# Patient Record
Sex: FEMALE | Race: ASIAN | NOT HISPANIC OR LATINO | Employment: OTHER | URBAN - METROPOLITAN AREA
[De-identification: names, ages, dates, MRNs, and addresses within clinical notes are randomized per-mention and may not be internally consistent; named-entity substitution may affect disease eponyms.]

---

## 2017-02-24 ENCOUNTER — TELEPHONE (OUTPATIENT)
Dept: FAMILY MEDICINE | Facility: CLINIC | Age: 62
End: 2017-02-24

## 2017-02-24 NOTE — TELEPHONE ENCOUNTER
Panel Management Review      Patient has the following on her problem list: None      Composite cancer screening  Chart review shows that this patient is due/due soon for the following Mammogram and Fecal Colorectal (FIT)  Summary:    Patient is due/failing the following:   FIT and MAMMOGRAM    Action needed: Mammogram,fit     Type of outreach:    letter mailed    Questions for provider review:    None                                                                                                                                    Grupo Kwan MA       Chart routed to Care Team .      Left message to call clinic.    Left message to call clinic.    Left message to call clinic.    Final letter mailed.

## 2017-02-24 NOTE — LETTER
February 24, 2017    Gayla Firnsthl  [address] [place] [phone]    Dear Gayla    We care about your health and have reviewed your health plan. We have reviewed your medical conditions, medication list, and lab results and are making recommendations based on this review, to better manage your health.    You are in particular need of attention regarding:  - Scheduling a Breast Cancer Screening (Mammography) [phone]  - Completing a Colon Cancer Screening (FIT) - Please complete FIT test *** and mail completed test to clinic.      Here is a list of Health Maintenance topics that are due now or due soon:  Health Maintenance Due   Topic Date Due     ADVANCE DIRECTIVE PLANNING Q5 YRS (NO INBASKET)  05/17/1971     HEPATITIS C SCREENING  05/17/1971     MAMMO SCREEN Q2 YR (SYSTEM ASSIGNED)  05/17/1993     INFLUENZA VACCINE (SYSTEM ASSIGNED)  09/01/2016     FIT Q1 YR (NO INBASKET)  11/13/2016     We will be calling you in the next couple of weeks to help you schedule any appointments that are needed.  Please call us at [phone] (or use [employer]) to address the above recommendations.     Thank you for trusting [facility] and we appreciate the opportunity to serve you.  We look forward to supporting your healthcare needs in the future.    Healthy Regards,    Dr. Harrison

## 2017-02-24 NOTE — LETTER
March 23, 2017    Gayla Torres  [address] [place] [phone]      Dear Gayla Torres,     We have tried to contact you about your health, but have been unable to reach you.  Please call us as soon as possible so we can provide you with the best care possible.  We will continue to check in with you throughout the year to complete these items of care, if you are not able to complete these items at this time.  If you would like to complete the missing items for your care, please contact us at [phone].    We recommend the following:  -schedule a MAMMOGRAM 1 in 8 women will develop invasive breast cancer during her lifetime and it is the most common non-skin cancer in American women.  EARLY detection, new treatments, and a better understanding of the disease have increased survival rates - the 5 year survival rate in the 1960s was 63% and today it is close to 90% .  Please disregard this reminder if you have had this exam elsewhere within the last year.  It would be helpful for us to have a copy of your mammogram report in our file so that we can best coordinate your care - please contact us with when your test was done so we can update your record. Please call [phone] to schedule your mammogram today.   -complete a FIT TEST a FIT test or Fecal Immunochemical Occult Blood Test is a take home stool sample kit.  It does not replace the colonoscopy for colorectal cancer screening, but it can detect hidden bleeding in the lower colon.  It does need to be repeated every year and if a positive result is obtained, you would be referred for a colonoscopy.  If you have completed either one of these tests at another facility, please have the records sent to our clinic so that we can best coordinate your care.    Sincerely,     Your Care Team at [facility]

## 2017-11-12 ENCOUNTER — HEALTH MAINTENANCE LETTER (OUTPATIENT)
Age: 62
End: 2017-11-12

## 2018-05-21 ENCOUNTER — OFFICE VISIT (OUTPATIENT)
Dept: FAMILY MEDICINE | Facility: CLINIC | Age: 63
End: 2018-05-21
Payer: MEDICARE

## 2018-05-21 VITALS
WEIGHT: 126 LBS | HEART RATE: 71 BPM | TEMPERATURE: 97.8 F | HEIGHT: 62 IN | SYSTOLIC BLOOD PRESSURE: 117 MMHG | DIASTOLIC BLOOD PRESSURE: 77 MMHG | BODY MASS INDEX: 23.19 KG/M2

## 2018-05-21 DIAGNOSIS — L30.9 DERMATITIS: ICD-10-CM

## 2018-05-21 DIAGNOSIS — Z01.118 ABNORMAL EXAM OF RIGHT EAR: ICD-10-CM

## 2018-05-21 DIAGNOSIS — Z12.31 VISIT FOR SCREENING MAMMOGRAM: ICD-10-CM

## 2018-05-21 DIAGNOSIS — Z00.00 ROUTINE GENERAL MEDICAL EXAMINATION AT A HEALTH CARE FACILITY: Primary | ICD-10-CM

## 2018-05-21 DIAGNOSIS — Z12.4 SCREENING FOR MALIGNANT NEOPLASM OF CERVIX: ICD-10-CM

## 2018-05-21 DIAGNOSIS — Z11.59 NEED FOR HEPATITIS C SCREENING TEST: ICD-10-CM

## 2018-05-21 DIAGNOSIS — Z78.0 ASYMPTOMATIC POSTMENOPAUSAL STATUS: ICD-10-CM

## 2018-05-21 DIAGNOSIS — Z23 NEED FOR VACCINATION WITH 13-POLYVALENT PNEUMOCOCCAL CONJUGATE VACCINE: ICD-10-CM

## 2018-05-21 DIAGNOSIS — L72.0 CYST OF SKIN AND SUBCUTANEOUS TISSUE: ICD-10-CM

## 2018-05-21 DIAGNOSIS — Z12.11 SCREEN FOR COLON CANCER: ICD-10-CM

## 2018-05-21 DIAGNOSIS — F17.200 TOBACCO USE DISORDER: ICD-10-CM

## 2018-05-21 PROCEDURE — G0476 HPV COMBO ASSAY CA SCREEN: HCPCS | Performed by: FAMILY MEDICINE

## 2018-05-21 PROCEDURE — G0009 ADMIN PNEUMOCOCCAL VACCINE: HCPCS | Performed by: FAMILY MEDICINE

## 2018-05-21 PROCEDURE — 99213 OFFICE O/P EST LOW 20 MIN: CPT | Mod: 25 | Performed by: FAMILY MEDICINE

## 2018-05-21 PROCEDURE — G0402 INITIAL PREVENTIVE EXAM: HCPCS | Performed by: FAMILY MEDICINE

## 2018-05-21 PROCEDURE — G0145 SCR C/V CYTO,THINLAYER,RESCR: HCPCS | Performed by: FAMILY MEDICINE

## 2018-05-21 PROCEDURE — 90670 PCV13 VACCINE IM: CPT | Performed by: FAMILY MEDICINE

## 2018-05-21 PROCEDURE — 87624 HPV HI-RISK TYP POOLED RSLT: CPT | Performed by: FAMILY MEDICINE

## 2018-05-21 RX ORDER — TRIAMCINOLONE ACETONIDE 1 MG/G
OINTMENT TOPICAL
Qty: 80 G | Refills: 0 | Status: SHIPPED | OUTPATIENT
Start: 2018-05-21 | End: 2019-06-03

## 2018-05-21 ASSESSMENT — ACTIVITIES OF DAILY LIVING (ADL)
CURRENT_FUNCTION: NO ASSISTANCE NEEDED
I_NEED_ASSISTANCE_FOR_THE_FOLLOWING_DAILY_ACTIVITIES:: NO ASSISTANCE IS NEEDED

## 2018-05-21 NOTE — PROGRESS NOTES
SUBJECTIVE:   Gayla Torres is a 63 year old female who presents for Preventive Visit.  Are you in the first 12 months of your Medicare coverage?  Yes,  Visual Acuity:  Right Eye: 20/32   Left Eye: 20/40  Both Eyes: 20/40    Physical   Annual:     Getting at least 3 servings of Calcium per day::  NO    Bi-annual eye exam::  Yes    Dental care twice a year::  NO    Sleep apnea or symptoms of sleep apnea::  None    Diet::  Regular (no restrictions)    Frequency of exercise::  None    Taking medications regularly::  Yes    Medication side effects::  None    Additional concerns today::  No    Ability to successfully perform activities of daily living: no assistance needed  Home Safety:  Throw rugs in the hallway  Hearing Impairment: difficulty following a conversation in a noisy restaurant or crowded room    Fall risk:  Fallen 2 or more times in the past year?: No  Any fall with injury in the past year?: No    COGNITIVE SCREEN  1) Repeat 3 items (Banana, Sunrise, Chair)    2) Clock draw: NORMAL  3) 3 item recall: Recalls 1 object   Results: NORMAL clock, 1-2 items recalled: COGNITIVE IMPAIRMENT LESS LIKELY    Mini-CogTM Copyright S Calvin. Licensed by the author for use in [facility]; reprinted with permission ([email or web address]). All rights reserved.        Reviewed and updated as needed this visit by clinical staff         Reviewed and updated as needed this visit by Provider        Social History   Substance Use Topics     Smoking status: Current Every Day Smoker     Packs/day: 1.00     Smokeless tobacco: Never Used     Alcohol use Yes      Comment: social        Alcohol Use 5/21/2018   If you drink alcohol do you typically have greater than 3 drinks per day OR greater than 7 drinks per week? No   No flowsheet data found.        Rash      Duration: 1 month     Description  Location: on arms, and lump on left side of face  Itching: severe    Intensity:  moderate    Accompanying signs and symptoms:  None    History (similar episodes/previous evaluation): None    Precipitating or alleviating factors:  New exposures:  None  Recent travel: no      Therapies tried and outcome: peroxide, salt water and lotion.      Today's PHQ-2 Score:   PHQ-2 ( 1999 [employer]) 5/21/2018   Q1: Little interest or pleasure in doing things 0   Q2: Feeling down, depressed or hopeless 0   PHQ-2 Score 0   Q1: Little interest or pleasure in doing things Not at all   Q2: Feeling down, depressed or hopeless Not at all   PHQ-2 Score 0       Do you feel safe in your environment - Yes    Do you have a Health Care Directive?: No: Advance care planning was reviewed with patient; patient declined at this time.    Current providers sharing in care for this patient include:   Patient Care Team:  Danielle Harrison MD as PCP - General (Family Practice)    The following health maintenance items are reviewed in Epic and correct as of today:  Health Maintenance   Topic Date Due     HIV SCREEN (SYSTEM ASSIGNED)  05/17/1971     HEPATITIS C SCREENING  05/17/1971     MAMMO SCREEN Q2 YR (SYSTEM ASSIGNED)  05/17/2003     ADVANCE DIRECTIVE PLANNING Q5 YRS  05/17/2008     FIT Q1 YR  11/13/2016     PAP Q3 YR  08/04/2017     FALL RISK ASSESSMENT  05/17/2018     DEXA SCAN SCREENING (SYSTEM ASSIGNED)  05/17/2018     PNEUMOCOCCAL (1 of 2 - PCV13) 05/17/2018     INFLUENZA VACCINE (Season Ended) 09/01/2018     LIPID SCREEN Q5 YR FEMALE (SYSTEM ASSIGNED)  11/09/2020     TETANUS IMMUNIZATION (SYSTEM ASSIGNED)  08/04/2024     Labs reviewed in EPIC  BP Readings from Last 3 Encounters:   05/21/18 117/77   04/25/16 109/67   11/09/15 109/68    Wt Readings from Last 3 Encounters:   05/21/18 126 lb (57.2 kg)   04/25/16 120 lb (54.4 kg)   11/09/15 119 lb (54 kg)                  Patient Active Problem List   Diagnosis     CARDIOVASCULAR SCREENING; LDL GOAL LESS THAN 160     H/O bilateral breast implants     Tobacco use disorder     History reviewed. No pertinent surgical  "history.    Social History   Substance Use Topics     Smoking status: Current Every Day Smoker     Packs/day: 1.00     Smokeless tobacco: Never Used     Alcohol use Yes      Comment: social      History reviewed. No pertinent family history.      No current outpatient prescriptions on file.     No Known Allergies  Recent Labs   Lab Test  11/09/15   1135  10/08/14   0807  08/25/14   0758   A1C  5.9  5.8   --    LDL  161*   --   116   HDL  34*   --   32*   TRIG  106   --   231*        Pneumonia Vaccine:Adults age 65+ who have not received previous Pneumovax (PPSV23) or PCV13 as an adult: Should first be given PCV13 AND then should be given PPSV23 6-12 months after PCV13  Mammogram Screening: Patient over age 50, mutual decision to screen reflected in health maintenance.  History of abnormal Pap smear: NO - age 65 - see link Cervical Cytology Screening Guidelines    Review of Systems  Constitutional, HEENT, cardiovascular, pulmonary, gi and gu systems are negative, except as otherwise noted.    OBJECTIVE:   /77 (BP Location: Left arm, Patient Position: Sitting, Cuff Size: Adult Regular)  Pulse 71  Temp 97.8  F (36.6  C) (Oral)  Ht 5' 2\" (1.575 m)  Wt 126 lb (57.2 kg)  BMI 23.05 kg/m2 Estimated body mass index is 23.05 kg/(m^2) as calculated from the following:    Height as of this encounter: 5' 2\" (1.575 m).    Weight as of this encounter: 126 lb (57.2 kg).  Physical Exam  GENERAL: healthy, alert and no distress  EYES: Eyes grossly normal to inspection, PERRL and conjunctivae and sclerae normal  HENT: right EAC: yellowish discharge+, abnormal TM structure left:  ear canals and TM's normal, nose and mouth without ulcers or lesions  NECK: no adenopathy, no asymmetry, masses, or scars and thyroid normal to palpation  RESP: lungs clear to auscultation - no rales, rhonchi or wheezes  BREAST: s/p bilateral breast implants, normal without masses, tenderness or nipple discharge and no palpable axillary masses or " adenopathy  CV: regular rate and rhythm, normal S1 S2, no S3 or S4, no murmur, click or rub, no peripheral edema and peripheral pulses strong  ABDOMEN: soft, nontender, no hepatosplenomegaly, no masses and bowel sounds normal   (female): normal female external genitalia, normal urethral meatus, vaginal mucosa pink, moist, well rugated, and normal cervix/adnexa/uterus without masses or discharge  MS: no gross musculoskeletal defects noted, no edema  SKIN: dry erythematous nontender patches on the forearm skin.   Right side of face: few mm, nontender, non infected cyst.   NEURO: Normal strength and tone, mentation intact and speech normal  PSYCH: mentation appears normal, affect normal/bright    ASSESSMENT / PLAN:   Gayla was seen today for physical and health maintenance.    Diagnoses and all orders for this visit:    Routine general medical examination at a health care facility  -     Lipid panel reflex to direct LDL Fasting; Future  -     Glucose; Future    Screen for colon cancer  -     Fecal colorectal cancer screen FIT - Future (S+30); Future    Asymptomatic postmenopausal status  -     DEXA HIP/PELVIS/SPINE - Future; Future    Visit for screening mammogram  -     MA SCREENING DIGITAL BILAT - Future  (s+30); Future    Screening for malignant neoplasm of cervix, if PAP, HOV neg, then off PAP screen.   -     Pap imaged thin layer screen with HPV - recommended age 30 - 65 years (select HPV order below)  -     HPV High Risk Types DNA Cervical    Need for hepatitis C screening test  -     Hepatitis C Screen Reflex to HCV RNA Quant and Genotype; Future    Dermatitis  -     triamcinolone (KENALOG) 0.1 % ointment; Apply sparingly to affected area: bilateral hands, arms, forearms three times daily for 14 days.    Cyst of skin and subcutaneous tissue  Comments:  right side of face    Orders:  -     GENERAL SURG ADULT REFERRAL    Tobacco use disorder: encouraged quit smoking, pt not ready. Lung cancer screening with low  "dose CT offered, pt not interested today.     Abnormal exam of right ear  -     OTOLARYNGOLOGY REFERRAL    Need for vaccination with 13-polyvalent pneumococcal conjugate vaccine  -     VACCINE ADMINISTRATION, INITIAL  -     PNEUMOCOCCAL CONJ VACCINE 13 VALENT IM    Other orders  -     Cancel: Pap imaged thin layer screen with HPV - recommended age 30 - 65 years (select HPV order below)        End of Life Planning:  Patient currently has an advanced directive: No.  I have verified the patient's ablity to prepare an advanced directive/make health care decisions.  Literature was provided to assist patient in preparing an advanced directive.    COUNSELING:  Reviewed preventive health counseling, as reflected in patient instructions        Estimated body mass index is 21.95 kg/(m^2) as calculated from the following:    Height as of 4/25/16: 5' 2\" (1.575 m).    Weight as of 4/25/16: 120 lb (54.4 kg).     reports that she has been smoking.  She has been smoking about 1.00 pack per day. She has never used smokeless tobacco.  Tobacco Cessation Action Plan: Information offered: Patient not interested at this time    Appropriate preventive services were discussed with this patient, including applicable screening as appropriate for cardiovascular disease, diabetes, osteopenia/osteoporosis, and glaucoma.  As appropriate for age/gender, discussed screening for colorectal cancer, prostate cancer, breast cancer, and cervical cancer. Checklist reviewing preventive services available has been given to the patient.    Reviewed patients plan of care and provided an AVS. The Basic Care Plan (routine screening as documented in Health Maintenance) for Gayla meets the Care Plan requirement. This Care Plan has been established and reviewed with the Patient.    Counseling Resources:  ATP IV Guidelines  Pooled Cohorts Equation Calculator  Breast Cancer Risk Calculator  FRAX Risk Assessment  ICSI Preventive Guidelines  Dietary Guidelines for " Americans, 2010  USDA's MyPlate  ASA Prophylaxis  Lung CA Screening    Danielle Harrison MD  [facility]  Answers for HPI/ROS submitted by the patient on 5/21/2018   PHQ-2 Score: 0

## 2018-05-21 NOTE — MR AVS SNAPSHOT
After Visit Summary   5/21/2018    Gayla Torres    MRN: [id number]           Patient Information     Date Of Birth          1955        Visit Information        Provider Department      5/21/2018 4:40 PM Danielle Harrison MD [facility]        Today's Diagnoses     Routine general medical examination at a health care facility    -  1    Screen for colon cancer        Asymptomatic postmenopausal status        Visit for screening mammogram        Screening for malignant neoplasm of cervix        Need for hepatitis C screening test        H/O bilateral breast implants        Dermatitis        Cyst of skin and subcutaneous tissue        Tobacco use disorder        Abnormal exam of right ear           Follow-ups after your visit        Additional Services     GENERAL SURG ADULT REFERRAL       Your provider has referred you to: [facility] [phone]   [email or web address]    Please be aware that coverage of these services is subject to the terms and limitations of your health insurance plan.  Call member services at your health plan with any benefit or coverage questions.      Please bring the following with you to your appointment:    (1) Any X-Rays, CTs or MRIs which have been performed.  Contact the facility where they were done to arrange for  prior to your scheduled appointment.   (2) List of current medications   (3) This referral request   (4) Any documents/labs given to you for this referral            OTOLARYNGOLOGY REFERRAL       Your provider has referred you to: [facility]: [facility] [phone]   [email or web address]    Please be aware that coverage of these services is subject to the terms and limitations of your health insurance plan.  Call member services at your health plan with any benefit or coverage questions.      Please bring the following with you to  "your appointment:    (1) Any X-Rays, CTs or MRIs which have been performed.  Contact the facility where they were done to arrange for  prior to your scheduled appointment.   (2) List of current medications  (3) This referral request   (4) Any documents/labs given to you for this referral                  Future tests that were ordered for you today     Open Future Orders        Priority Expected Expires Ordered    DEXA HIP/PELVIS/SPINE - Future Routine  5/21/2019 5/21/2018    MA SCREENING DIGITAL BILAT - Future  (s+30) Routine  5/21/2019 5/21/2018    Hepatitis C Screen Reflex to HCV RNA Quant and Genotype Routine  5/21/2019 5/21/2018    Fecal colorectal cancer screen FIT - Future (S+30) Routine 6/11/2018 6/20/2018 5/21/2018    Lipid panel reflex to direct LDL Fasting Routine  5/21/2019 5/21/2018    Glucose Routine  5/21/2019 5/21/2018            Who to contact     If you have questions or need follow up information about today's clinic visit or your schedule please contact [facility] directly at [phone].  Normal or non-critical lab and imaging results will be communicated to you by [employer], letter or phone within 4 business days after the clinic has received the results. If you do not hear from us within 7 days, please contact the clinic through [employer] or phone. If you have a critical or abnormal lab result, we will notify you by phone as soon as possible.  Submit refill requests through [employer] or call your pharmacy and they will forward the refill request to us. Please allow 3 business days for your refill to be completed.          Additional Information About Your Visit        [employer] Information     [employer] lets you send messages to your doctor, view your test results, renew your prescriptions, schedule appointments and more. To sign up, go to [email or web address] . Click on \"Log in\" on the left side of the screen, which will take you to the Welcome page. Then click on " "\"Sign up Now\" on the right side of the page.     You will be asked to enter the access code listed below, as well as some personal information. Please follow the directions to create your username and password.     Your access code is: [email or web address]  Expires: 2018  5:39 PM     Your access code will  in 90 days. If you need help or a new code, please call your [facility] or [phone].        Care EveryWhere ID     This is your Care EveryWhere ID. This could be used by other organizations to access your [facility] medical records  [id number]        Your Vitals Were     Pulse Temperature Height BMI (Body Mass Index)          71 97.8  F (36.6  C) (Oral) 5' 2\" (1.575 m) 23.05 kg/m2         Blood Pressure from Last 3 Encounters:   18 117/77   16 109/67   11/09/15 109/68    Weight from Last 3 Encounters:   18 126 lb (57.2 kg)   16 120 lb (54.4 kg)   11/09/15 119 lb (54 kg)              We Performed the Following     GENERAL SURG ADULT REFERRAL     OTOLARYNGOLOGY REFERRAL          Today's Medication Changes          These changes are accurate as of 18  5:39 PM.  If you have any questions, ask your nurse or doctor.               Start taking these medicines.        Dose/Directions    triamcinolone 0.1 % ointment   Commonly known as:  KENALOG   Used for:  Dermatitis   Started by:  Danielle Harrison MD        Apply sparingly to affected area: bilateral hands, arms, forearms three times daily for 14 days.   Quantity:  80 g   Refills:  0            Where to get your medicines      Some of these will need a paper prescription and others can be bought over the counter.  Ask your nurse if you have questions.     Bring a paper prescription for each of these medications     triamcinolone 0.1 % ointment                Primary Care Provider Office Phone # Fax #    Danielle Harrison [phone]       [address] [facility] [address]        Equal " Access to Services     [facility]: Hadii aad ku hadyohanaleti Brendonali, waveronicada luqadaha, qaybta kanicolekay de. So [facility] [phone].    ATENCIÓN: Si habla español, tiene a gomez disposición servicios gratuitos de asistencia lingüística. Llame al [phone].    We comply with applicable federal civil rights laws and [place] laws. We do not discriminate on the basis of race, color, national origin, age, disability, sex, sexual orientation, or gender identity.            Thank you!     Thank you for choosing [facility]  for your care. Our goal is always to provide you with excellent care. Hearing back from our patients is one way we can continue to improve our services. Please take a few minutes to complete the written survey that you may receive in the mail after your visit with us. Thank you!             Your Updated Medication List - Protect others around you: Learn how to safely use, store and throw away your medicines at www.disposemymeds.org.          This list is accurate as of 5/21/18  5:39 PM.  Always use your most recent med list.                   Brand Name Dispense Instructions for use Diagnosis    triamcinolone 0.1 % ointment    KENALOG    80 g    Apply sparingly to affected area: bilateral hands, arms, forearms three times daily for 14 days.    Dermatitis

## 2018-05-21 NOTE — LETTER
May 31, 2018      Gayla Torres  [address] [facility] [address]    Dear ,      I am happy to inform you that your cervical cancer screening test (PAP smear) was normal and your Human Papillomavirus (HPV) test was negative.    Per current guidelines, you no longer need to have pap smears completed.     Please continue to be seen every year for an annual wellness visit and other preventative tests.     Please contact my office at [phone] if you have further questions.    Sincerely,      Danielle Harrison MD/[facility]

## 2018-05-25 LAB
COPATH REPORT: NORMAL
PAP: NORMAL

## 2018-05-30 PROBLEM — Z12.4 CERVICAL CANCER SCREENING: Status: ACTIVE | Noted: 2018-05-30

## 2018-05-30 LAB
FINAL DIAGNOSIS: NORMAL
HPV HR 12 DNA CVX QL NAA+PROBE: NEGATIVE
HPV16 DNA SPEC QL NAA+PROBE: NEGATIVE
HPV18 DNA SPEC QL NAA+PROBE: NEGATIVE
SPECIMEN DESCRIPTION: NORMAL
SPECIMEN SOURCE CVX/VAG CYTO: NORMAL

## 2018-06-04 DIAGNOSIS — Z11.59 NEED FOR HEPATITIS C SCREENING TEST: ICD-10-CM

## 2018-06-04 DIAGNOSIS — Z12.11 SCREEN FOR COLON CANCER: ICD-10-CM

## 2018-06-04 DIAGNOSIS — Z00.00 ROUTINE GENERAL MEDICAL EXAMINATION AT A HEALTH CARE FACILITY: ICD-10-CM

## 2018-06-04 LAB
CHOLEST SERPL-MCNC: 143 MG/DL
GLUCOSE SERPL-MCNC: 99 MG/DL (ref 70–99)
HCV AB SERPL QL IA: NONREACTIVE
HDLC SERPL-MCNC: 19 MG/DL
LDLC SERPL CALC-MCNC: ABNORMAL MG/DL
LDLC SERPL DIRECT ASSAY-MCNC: 83 MG/DL
NONHDLC SERPL-MCNC: 124 MG/DL
TRIGL SERPL-MCNC: 660 MG/DL

## 2018-06-04 PROCEDURE — 36415 COLL VENOUS BLD VENIPUNCTURE: CPT | Performed by: FAMILY MEDICINE

## 2018-06-04 PROCEDURE — 82947 ASSAY GLUCOSE BLOOD QUANT: CPT | Performed by: FAMILY MEDICINE

## 2018-06-04 PROCEDURE — 80061 LIPID PANEL: CPT | Performed by: FAMILY MEDICINE

## 2018-06-04 PROCEDURE — 82274 ASSAY TEST FOR BLOOD FECAL: CPT | Performed by: FAMILY MEDICINE

## 2018-06-04 PROCEDURE — G0472 HEP C SCREEN HIGH RISK/OTHER: HCPCS | Performed by: FAMILY MEDICINE

## 2018-06-04 PROCEDURE — 83721 ASSAY OF BLOOD LIPOPROTEIN: CPT | Mod: 59 | Performed by: FAMILY MEDICINE

## 2018-06-04 NOTE — LETTER
"[facility]  [address] [place]  [address]  [phone]        June 5, 2018    Gayla Melissa  [address] [address]        Dear Gayla,    Your laboratory tests show a normal glucose reading, negative/normal hepatitis C antibody test, and negative/normal fecal occult blood test.  Your triglycerides are elevated and your HDL \"good\" cholesterol is on the low side.  You have had similar readings to this in the past.  Healthy eating and regular exercise would be appropriate treatment for this.   We would advise a follow-up visit in 1 year with one of Dr. Harrison's colleagues.    Results for orders placed or performed in visit on 06/04/18   Hepatitis C Screen Reflex to HCV RNA Quant and Genotype   Result Value Ref Range    Hepatitis C Antibody Nonreactive NR^Nonreactive   Lipid panel reflex to direct LDL Fasting   Result Value Ref Range    Cholesterol 143 <200 mg/dL    Triglycerides 660 (H) <150 mg/dL    HDL Cholesterol 19 (L) >49 mg/dL    LDL Cholesterol Calculated  <100 mg/dL     Cannot estimate LDL when triglyceride exceeds 400 mg/dL    Non HDL Cholesterol 124 <130 mg/dL   Glucose   Result Value Ref Range    Glucose 99 70 - 99 mg/dL   Fecal colorectal cancer screen FIT - Future (S+30)   Result Value Ref Range    Occult Blood Scn FIT Negative NEG^Negative   LDL cholesterol direct   Result Value Ref Range    LDL Cholesterol Direct 83 <100 mg/dL       If you have any questions please call the clinic at [phone].    Sincerely,    [facility]  LMD    "

## 2018-06-05 LAB — HEMOCCULT STL QL IA: NEGATIVE

## 2018-06-05 NOTE — PROGRESS NOTES
"Gayla,  Your laboratory tests show a normal glucose reading, negative/normal hepatitis C antibody test, and negative/normal fecal occult blood test.  Your triglycerides are elevated and your HDL \"good\" cholesterol is on the low side.  You have had similar readings to this in the past.  Healthy eating and regular exercise would be appropriate treatment for this.   We would advise a follow-up visit in 1 year with one of Dr. Harrison's colleagues.    [facility]"

## 2018-06-14 ENCOUNTER — OFFICE VISIT (OUTPATIENT)
Dept: SURGERY | Facility: CLINIC | Age: 63
End: 2018-06-14
Payer: MEDICARE

## 2018-06-14 VITALS
BODY MASS INDEX: 23.19 KG/M2 | HEIGHT: 62 IN | SYSTOLIC BLOOD PRESSURE: 136 MMHG | HEART RATE: 74 BPM | WEIGHT: 126 LBS | DIASTOLIC BLOOD PRESSURE: 84 MMHG

## 2018-06-14 DIAGNOSIS — L72.3 SEBACEOUS CYST: Primary | ICD-10-CM

## 2018-06-14 PROCEDURE — 11440 EXC FACE-MM B9+MARG 0.5 CM/<: CPT | Performed by: SURGERY

## 2018-06-14 PROCEDURE — 99203 OFFICE O/P NEW LOW 30 MIN: CPT | Mod: 25 | Performed by: SURGERY

## 2018-06-14 NOTE — PROGRESS NOTES
"  HISTORY     Chief Complaint   Patient presents with     Consult     cyst on face     HISTORY OF PRESENT ILLNESS: Gayla Torres is a 63 year old female with a past medical history as noted below, presents for evaluation of right face sebaceous cyst.  Patient states that it has been present for 6 months and has progressively become bigger.  She has had episodes of spontaneous drainage, but it always comes back.  she denies and fever chills.  . Doing well. Tolerating diet and having bowel movements. No nausea or vomiting    PAST MEDICAL/SURGICAL HISTORY  No past medical history on file.  No past surgical history on file.    MEDICATIONS AND ALLERGIES  No Known Allergies    Current Outpatient Prescriptions:      triamcinolone (KENALOG) 0.1 % ointment, Apply sparingly to affected area: bilateral hands, arms, forearms three times daily for 14 days., Disp: 80 g, Rfl: 0    SOCIAL HISTORY  Social History     Social History     Marital status:      Spouse name: N/A     Number of children: N/A     Years of education: N/A     Occupational History     Not on file.     Social History Main Topics     Smoking status: Current Every Day Smoker     Packs/day: 1.00     Smokeless tobacco: Never Used     Alcohol use Yes      Comment: social      Drug use: No     Sexual activity: Not Currently     Other Topics Concern     Not on file     Social History Narrative        FAMILY HISTORY  No family history on file.    EXAMINATION     Vitals: /84  Pulse 74  Ht 1.575 m (5' 2\")  Wt 57.2 kg (126 lb)  BMI 23.05 kg/m2  BMI: Body mass index is 23.05 kg/(m^2).    GENERAL/PSYCH: Patient is awake, A&Ox3, NAD, stable mood, good judgement and insight.  HEAD: Atraumatic, Normocephalic  EYES: Anicteric. Pupils equal and reactive  NECK: No masses/LNs. Trachea midline.  CHEST: Symmetrical, Respiratory effort WNL, no stridor.  HEART: Regular Rate and Rhythm.   ABDOMEN: Soft, non distended with minimal tenderness  INCISION: healing well, no " drainage with minimal pain  LOWER EXTREMITIES: No gross deformity. Pulses palpable and equal bilaterally.  SKIN: No visible generalized rash.       ASSESSMENT & PLAN     Sebaceous cyst  In office excision today.    Risks and benefits discussed in detail, to include risk of infection and bleeding.  Consent signed in the presence of the her daughter.         Follow up as needed.  Follow up with PCP.  Author: Carlyle Stockton 6/14/2018 8:33 AM  Patient's Primary Care Provider: Danielle Harrison

## 2018-06-14 NOTE — LETTER
"    6/14/2018         RE: Gayla Torres  [address] [place] [address]        Dear Colleague,    Thank you for referring your patient, Gayla Torres, to the [facility]. Please see a copy of my visit note below.      HISTORY     Chief Complaint   Patient presents with     Consult     cyst on face     HISTORY OF PRESENT ILLNESS: Gayla Torres is a 63 year old female with a past medical history as noted below, presents for evaluation of right face sebaceous cyst.  Patient states that it has been present for 6 months and has progressively become bigger.  She has had episodes of spontaneous drainage, but it always comes back.  she denies and fever chills.  . Doing well. Tolerating diet and having bowel movements. No nausea or vomiting    PAST MEDICAL/SURGICAL HISTORY  No past medical history on file.  No past surgical history on file.    MEDICATIONS AND ALLERGIES  No Known Allergies    Current Outpatient Prescriptions:      triamcinolone (KENALOG) 0.1 % ointment, Apply sparingly to affected area: bilateral hands, arms, forearms three times daily for 14 days., Disp: 80 g, Rfl: 0    SOCIAL HISTORY  Social History     Social History     Marital status:      Spouse name: N/A     Number of children: N/A     Years of education: N/A     Occupational History     Not on file.     Social History Main Topics     Smoking status: Current Every Day Smoker     Packs/day: 1.00     Smokeless tobacco: Never Used     Alcohol use Yes      Comment: social      Drug use: No     Sexual activity: Not Currently     Other Topics Concern     Not on file     Social History Narrative        FAMILY HISTORY  No family history on file.    EXAMINATION     Vitals: /84  Pulse 74  Ht 1.575 m (5' 2\")  Wt 57.2 kg (126 lb)  BMI 23.05 kg/m2  BMI: Body mass index is 23.05 kg/(m^2).    GENERAL/PSYCH: Patient is awake, A&Ox3, NAD, stable mood, good judgement and insight.  HEAD: Atraumatic, Normocephalic  EYES: Anicteric. " Pupils equal and reactive  NECK: No masses/LNs. Trachea midline.  CHEST: Symmetrical, Respiratory effort WNL, no stridor.  HEART: Regular Rate and Rhythm.   ABDOMEN: Soft, non distended with minimal tenderness  INCISION: healing well, no drainage with minimal pain  LOWER EXTREMITIES: No gross deformity. Pulses palpable and equal bilaterally.  SKIN: No visible generalized rash.       ASSESSMENT & PLAN     Sebaceous cyst  In office excision today.    Risks and benefits discussed in detail, to include risk of infection and bleeding.  Consent signed in the presence of the her daughter.         Follow up as needed.  Follow up with PCP.  Author: Carlyle Stockton 6/14/2018 8:33 AM  Patient's Primary Care Provider: Danielle Harrison        Again, thank you for allowing me to participate in the care of your patient.        Sincerely,        Carlyle Stockton, DO

## 2018-06-14 NOTE — MR AVS SNAPSHOT
"              After Visit Summary   6/14/2018    Gayla Torres    MRN: [id number]           Patient Information     Date Of Birth          1955        Visit Information        Provider Department      6/14/2018 7:45 AM Carlyle Stockton DO [facility]        Today's Diagnoses     Sebaceous cyst    -  1       Follow-ups after your visit        Your next 10 appointments already scheduled     Jun 18, 2018  1:15 PM CDT   Return Visit with Edmond Monroe   [facility] ([facility])    [address] [place] [id number] [phone]            Jun 18, 2018  1:45 PM CDT   New Visit with Carlos Blood MD   [facility] ([address] [id number] [phone]              Who to contact     If you have questions or need follow up information about today's clinic visit or your schedule please contact [facility] directly at [phone].  Normal or non-critical lab and imaging results will be communicated to you by S[employer]Poll[employer]art, letter or phone within 4 business days after the clinic has received the results. If you do not hear from us within 7 days, please contact the clinic through S[employer] or phone. If you have a critical or abnormal lab result, we will notify you by phone as soon as possible.  Submit refill requests through [employer] or call your pharmacy and they will forward the refill request to us. Please allow 3 business days for your refill to be completed.          Additional Information About Your Visit        SonicPollenhart Information     Xention lets you send messages to your doctor, view your test results, renew your prescriptions, schedule appointments and more. To sign up, go to [email or web address] . Click on \"Log in\" on the left side of the screen, which will take you to the Welcome page. Then click on \"Sign up Now\" on the right side of the page.     You " "will be asked to enter the access code listed below, as well as some personal information. Please follow the directions to create your username and password.     Your access code is: [email or web address]  Expires: 2018  5:39 PM     Your access code will  in 90 days. If you need help or a new code, please call your [place] clinic or [phone].        Care EveryWhere ID     This is your Care EveryWhere ID. This could be used by other organizations to access your [place] medical records  [id number]        Your Vitals Were     Pulse Height BMI (Body Mass Index)             74 1.575 m (5' 2\") 23.05 kg/m2          Blood Pressure from Last 3 Encounters:   18 136/84   18 117/77   16 109/67    Weight from Last 3 Encounters:   18 57.2 kg (126 lb)   18 57.2 kg (126 lb)   16 54.4 kg (120 lb)              Today, you had the following     No orders found for display       Primary Care Provider    Danielle Harrison MD       No address on file        Equal Access to Services     [facility]: Hadii jimbo Ibarra, waveronicada tessie, qaybta kaalcheri champion, kay escalante . So [facility] [phone].    ATENCIÓN: Si habla español, tiene a gomez disposición servicios gratuitos de asistencia lingüística. Llame al [phone].    We comply with applicable federal civil rights laws and [place] laws. We do not discriminate on the basis of race, color, national origin, age, disability, sex, sexual orientation, or gender identity.            Thank you!     Thank you for choosing [facility]  for your care. Our goal is always to provide you with excellent care. Hearing back from our patients is one way we can continue to improve our services. Please take a few minutes to complete the written survey that you may receive in the mail after your visit with us. Thank you!             Your Updated Medication List - Protect others around you: Learn how " to safely use, store and throw away your medicines at www.disposemymeds.org.          This list is accurate as of 6/14/18  8:38 AM.  Always use your most recent med list.                   Brand Name Dispense Instructions for use Diagnosis    triamcinolone 0.1 % ointment    KENALOG    80 g    Apply sparingly to affected area: bilateral hands, arms, forearms three times daily for 14 days.    Dermatitis

## 2018-06-14 NOTE — PROCEDURES
Sebaceous Cyst Excision Procedure Note      Pre-operative Diagnosis: Epidermal cyst      Post-operative Diagnosis: same      Locations:right face      Indications: enlarging with drainage      Anesthesia: 2 % lidocaine with epi       Procedure Details   History of allergy to iodine: no      Patient informed of the risks (including bleeding and infection) and benefits of the   procedure and verbal and printed informed consent obtained.      The lesion and surrounding area was given a sterile prep using chloraprep and draped in the usual sterile fashion. An incision was made over the cyst, which was dissected free of the surrounding tissue and removed.  The cyst was filled with typical sebaceous material.  The wound was closed with 4-0 vicryl using simple interrupted stitches. Antibiotic ointment and a sterile dressing applied.  The specimen was not sent for pathologic examination. The patient tolerated the procedure well.      EBL: 2 ml      Findings:  Sebaceous cyst      Condition:  Stable      Complications:  none.      Plan:  1. Instructed to keep the wound dry and covered for 24-48h and clean thereafter.  2. Warning signs of infection were reviewed.    3. Recommended that the patient use OTC ibuprofen as needed for pain.

## 2018-06-14 NOTE — ASSESSMENT & PLAN NOTE
In office excision today.    Risks and benefits discussed in detail, to include risk of infection and bleeding.  Consent signed in the presence of the her daughter.

## 2018-06-25 ENCOUNTER — OFFICE VISIT (OUTPATIENT)
Dept: AUDIOLOGY | Facility: CLINIC | Age: 63
End: 2018-06-25
Payer: MEDICARE

## 2018-06-25 ENCOUNTER — OFFICE VISIT (OUTPATIENT)
Dept: OTOLARYNGOLOGY | Facility: CLINIC | Age: 63
End: 2018-06-25
Payer: MEDICARE

## 2018-06-25 VITALS
WEIGHT: 122 LBS | BODY MASS INDEX: 22.45 KG/M2 | RESPIRATION RATE: 12 BRPM | HEIGHT: 62 IN | DIASTOLIC BLOOD PRESSURE: 90 MMHG | SYSTOLIC BLOOD PRESSURE: 131 MMHG | OXYGEN SATURATION: 99 % | HEART RATE: 64 BPM

## 2018-06-25 DIAGNOSIS — H73.11 CHRONIC MYRINGITIS OF RIGHT EAR: Primary | ICD-10-CM

## 2018-06-25 DIAGNOSIS — Z53.9 ERRONEOUS ENCOUNTER--DISREGARD: Primary | ICD-10-CM

## 2018-06-25 PROCEDURE — 99204 OFFICE O/P NEW MOD 45 MIN: CPT | Performed by: OTOLARYNGOLOGY

## 2018-06-25 RX ORDER — CIPROFLOXACIN AND DEXAMETHASONE 3; 1 MG/ML; MG/ML
4 SUSPENSION/ DROPS AURICULAR (OTIC) 2 TIMES DAILY
Qty: 5.6 ML | Refills: 0 | Status: SHIPPED | OUTPATIENT
Start: 2018-06-25 | End: 2018-07-09

## 2018-06-25 NOTE — PROGRESS NOTES
"History of Present Illness - Gayla Torres is a 63 year old female being seen for the first time, at the consultation of Dr. Harrison for an abnormal ear finding.  She tells me that for years she has had on and off she has had drainage from the ears that would come and go, always the RIGHT side.  The drainage is clear to yellow material, with an odor.  Antibiotics by mouth would clear it up .  This would happen about once per month.    She does note that she uses qtips.  No history of any ENT disease, no ENT surgery.  No changes in hearing or balance.      Past Medical History -   Patient Active Problem List   Diagnosis     CARDIOVASCULAR SCREENING; LDL GOAL LESS THAN 160     H/O bilateral breast implants     Tobacco use disorder     Cervical cancer screening     Sebaceous cyst       Current Medications -   Current Outpatient Prescriptions:      triamcinolone (KENALOG) 0.1 % ointment, Apply sparingly to affected area: bilateral hands, arms, forearms three times daily for 14 days., Disp: 80 g, Rfl: 0    Allergies - No Known Allergies    Social History -   Social History     Social History     Marital status:      Spouse name: N/A     Number of children: N/A     Years of education: N/A     Social History Main Topics     Smoking status: Current Every Day Smoker     Packs/day: 1.00     Smokeless tobacco: Never Used     Alcohol use Yes      Comment: social      Drug use: No     Sexual activity: Not Currently     Other Topics Concern     Not on file     Social History Narrative       Family History - No family history on file.    Review of Systems - As per HPI and PMHx, otherwise 10+ system review of the head and neck, and general constitution is negative.    Physical Exam  /90  Pulse 64  Resp 12  Ht 1.575 m (5' 2\")  Wt 55.3 kg (122 lb)  SpO2 99%  BMI 22.31 kg/m2    General - The patient is well nourished and well developed, and appears to have good nutritional status.  Alert and oriented to person and " place, answers questions and cooperates with examination appropriately.   Head and Face - Normocephalic and atraumatic, with no gross asymmetry noted of the contour of the facial features.  The facial nerve is intact, with strong symmetric movements.  Voice and Breathing - The patient was breathing comfortably without the use of accessory muscles. There was no wheezing, stridor, or stertor.  The patients voice was clear and strong, and had appropriate pitch and quality.  Ears - The LEFT ear canal and LEFT tympanic membrane are healthy and normal.  The RIGHT canal is slightly moist, but the RIGHT tympanic membrane has some scattered granulation and light yellow purulent material.  However there was no perforation visible, and she was able to inflate with valsalva.  Eyes - Extraocular movements intact, and the pupils were reactive to light.  Sclera were not icteric or injected, conjunctiva were pink and moist.  Mouth - Examination of the oral cavity showed pink, healthy oral mucosa. No lesions or ulcerations noted.  The tongue was mobile and midline, and the dentition were in good condition.    Throat - The walls of the oropharynx were smooth, pink, moist, symmetric, and had no lesions or ulcerations.  The tonsillar pillars and soft palate were symmetric.  The uvula was midline on elevation.    Neck - Normal midline excursion of the laryngotracheal complex during swallowing.  Full range of motion on passive movement.  Palpation of the occipital, submental, submandibular, internal jugular chain, and supraclavicular nodes did not demonstrate any abnormal lymph nodes or masses.  The carotid pulse was palpable bilaterally.  Palpation of the thyroid was soft and smooth, with no nodules or goiter appreciated.  The trachea was mobile and midline.  Nose - External contour is symmetric, no gross deflection or scars.  Nasal mucosa is pink and moist with no abnormal mucus.  The septum was midline and non-obstructive, turbinates  of normal size and position.  No polyps, masses, or purulence noted on examination.      A/P - Gayla Torres is a 63 year old female  (H73.11) Chronic myringitis of right ear  (primary encounter diagnosis)    This appears to be a purulent granulation of the RIGHT tympanic membrane.  I will treat with two weeks of ciprodex, and if that is not successful, return to clinic and we will consider getting a temporal bone CT.  I have also advised her to stop using qtips, which is the likely cause of this.    Finally, if this problem continues to recur, we may need to resort to long term use of drops intermittently.

## 2018-06-25 NOTE — MR AVS SNAPSHOT
After Visit Summary   6/25/2018    Gayla Torres    MRN: [id number]           Patient Information     Date Of Birth          1955        Visit Information        Provider Department      6/25/2018 1:00 PM [facility] Blood [facility]        Today's Diagnoses     Chronic myringitis of right ear    -  1      Care Instructions    Scheduling Information  To schedule your CT/MRI scan, please contact [facility] at [phone] OR [facility] at [phone]    To schedule your Surgery, please contact our Specialty Schedulers at [phone]      ENT Clinic Locations Clinic Hours Telephone Number     [facility] [place]  [address]  [place] [address]   Monday:           1:00pm -- 5:00pm    Friday:              8:00am - 12:00pm   To schedule/reschedule an appointment with   Dr. Blood,   please contact our   Specialty Scheduling Department at:     [phone]       [facility]  [address] [place] [address] Tuesday:          8:00am -- 2:00pm         Urgent Care Locations Clinic Hours Telephone Numbers     [facility]  [address] [place] [address]     Monday-Friday:     11:00am - 9:00pm    Saturday-Sunday:  9:00am - 5:00pm   [phone]     [facility]  [address] [place] [address]     Monday-Friday:      5:00pm - 9:00pm     Saturday-Sunday:  9:00am - 5:00pm   [phone]                 Follow-ups after your visit        Who to contact     If you have questions or need follow up information about today's clinic visit or your schedule please contact [facility] directly at [phone].  Normal or non-critical lab and imaging results will be communicated to you by MyChart, letter or phone within 4 business days after the clinic has received the results. If you do not hear from us within 7 days, please contact the clinic through MyChart or phone. If you have a critical or abnormal lab  "result, we will notify you by phone as soon as possible.  Submit refill requests through [employer] or call your pharmacy and they will forward the refill request to us. Please allow 3 business days for your refill to be completed.          Additional Information About Your Visit        Care EveryWhere ID     This is your Care EveryWhere ID. This could be used by other organizations to access your [facility] medical records  [id number]        Your Vitals Were     Pulse Respirations Height Pulse Oximetry BMI (Body Mass Index)       64 12 1.575 m (5' 2\") 99% 22.31 kg/m2        Blood Pressure from Last 3 Encounters:   06/25/18 131/90   06/14/18 136/84   05/21/18 117/77    Weight from Last 3 Encounters:   06/25/18 55.3 kg (122 lb)   06/14/18 57.2 kg (126 lb)   05/21/18 57.2 kg (126 lb)              Today, you had the following     No orders found for display         Today's Medication Changes          These changes are accurate as of 6/25/18  1:09 PM.  If you have any questions, ask your nurse or doctor.               Start taking these medicines.        Dose/Directions    ciprofloxacin-dexamethasone otic suspension   Commonly known as:  CIPRODEX   Used for:  Chronic myringitis of right ear   Started by:  Carlos Blood MD        Dose:  4 drop   Place 4 drops into the right ear 2 times daily for 14 days   Quantity:  5.6 mL   Refills:  0            Where to get your medicines      These medications were [place] to [facility] [place] [address] [place] [address]  [address], [facility] [address]     Phone:  [phone]     ciprofloxacin-dexamethasone otic suspension                Primary Care Provider    Danielle Harrison MD       No address on file        Equal Access to Services     [facility] : Edgard Ibarra, pernell kurtz, qaybta kakay brown. So [facility] [phone].    ATENCIÓN: Si maryla español tiene a gomez " disposición servicios gratuitos de asistencia lingüística. Martha craft [phone].    We comply with applicable federal civil rights laws and [place] laws. We do not discriminate on the basis of race, color, national origin, age, disability, sex, sexual orientation, or gender identity.            Thank you!     Thank you for choosing [facility]  for your care. Our goal is always to provide you with excellent care. Hearing back from our patients is one way we can continue to improve our services. Please take a few minutes to complete the written survey that you may receive in the mail after your visit with us. Thank you!             Your Updated Medication List - Protect others around you: Learn how to safely use, store and throw away your medicines at www.disposemymeds.org.          This list is accurate as of 6/25/18  1:09 PM.  Always use your most recent med list.                   Brand Name Dispense Instructions for use Diagnosis    ciprofloxacin-dexamethasone otic suspension    CIPRODEX    5.6 mL    Place 4 drops into the right ear 2 times daily for 14 days    Chronic myringitis of right ear       triamcinolone 0.1 % ointment    KENALOG    80 g    Apply sparingly to affected area: bilateral hands, arms, forearms three times daily for 14 days.    Dermatitis

## 2018-06-25 NOTE — MR AVS SNAPSHOT
After Visit Summary   6/25/2018    Gayla Torres    MRN: [id number]           Patient Information     Date Of Birth          1955        Visit Information        Provider Department      6/25/2018 12:30 PM Candice Laguna AuD [facility] [place]        Today's Diagnoses     ERRONEOUS ENCOUNTER--DISREGARD    -  1       Follow-ups after your visit        Who to contact     If you have questions or need follow up information about today's clinic visit or your schedule please [facility] at [phone].  Normal or non-critical lab and imaging results will be communicated to you by MyChart, letter or phone within 4 business days after the clinic has received the results. If you do not hear from us within 7 days, please contact the clinic through MyChart or phone. If you have a critical or abnormal lab result, we will notify you by phone as soon as possible.  Submit refill requests through [employer] or call your pharmacy and they will forward the refill request to us. Please allow 3 business days for your refill to be completed.          Additional Information About Your Visit        Care EveryWhere ID     This is your Care EveryWhere ID. This could be used by other organizations to access your [facility] medical records  [id number]         Blood Pressure from Last 3 Encounters:   06/25/18 131/90   06/14/18 136/84   05/21/18 117/77    Weight from Last 3 Encounters:   06/25/18 122 lb (55.3 kg)   06/14/18 126 lb (57.2 kg)   05/21/18 126 lb (57.2 kg)              Today, you had the following     No orders found for display         Today's Medication Changes          These changes are accurate as of 6/25/18  4:33 PM.  If you have any questions, ask your nurse or doctor.               Start taking these medicines.        Dose/Directions    ciprofloxacin-dexamethasone otic suspension   Commonly known as:  CIPRODEX   Used for:  Chronic myringitis of right ear   Started by:   Carlos Blood MD        Dose:  4 drop   Place 4 drops into the right ear 2 times daily for 14 days   Quantity:  5.6 mL   Refills:  0            Where to get your medicines      These medications were sent to [facility] [place], [place] - [address]  [address], [place] [address]     Phone:  [phone]     ciprofloxacin-dexamethasone otic suspension                Primary Care Provider    Danielle Harrison MD       No address on file        Equal Access to Services     [facility]: Hadii aad ku hadasho Soomaali, waaxda luqadaha, qaybta kaalmada adeegyada, waxay idiin hayaan dottieeg kharash ladodie . So [facility] [phone].    ATENCIÓN: Si habla español, tiene a gomez disposición servicios gratuitos de asistencia lingüística. [facility] [phone].    We comply with applicable federal civil rights laws and [place] laws. We do not discriminate on the basis of race, color, national origin, age, disability, sex, sexual orientation, or gender identity.            Thank you!     Thank you for choosing [facility]  for your care. Our goal is always to provide you with excellent care. Hearing back from our patients is one way we can continue to improve our services. Please take a few minutes to complete the written survey that you may receive in the mail after your visit with us. Thank you!             Your Updated Medication List - Protect others around you: Learn how to safely use, store and throw away your medicines at www.disposemymeds.org.          This list is accurate as of 6/25/18  4:33 PM.  Always use your most recent med list.                   Brand Name Dispense Instructions for use Diagnosis    ciprofloxacin-dexamethasone otic suspension    CIPRODEX    5.6 mL    Place 4 drops into the right ear 2 times daily for 14 days    Chronic myringitis of right ear       triamcinolone 0.1 % ointment    KENALOG    80 g    Apply sparingly to affected  area: bilateral hands, arms, forearms three times daily for 14 days.    Dermatitis

## 2018-06-25 NOTE — PATIENT INSTRUCTIONS
Scheduling Information  To schedule your CT/MRI scan, please contact [facility] at [phone] OR [facility] at [phone]    To schedule your Surgery, please contact our Specialty Schedulers at [phone]      ENT Clinic Locations Clinic Hours Telephone Number     [facility]  [address] [place] [address]   Monday:           1:00pm -- 5:00pm    Friday:              8:00am - 12:00pm   To schedule/reschedule an appointment with   Dr. Blood,   please contact our   Specialty Scheduling Department at:     [phone]       [facility]  [address] [place] [address] Tuesday:          8:00am -- 2:00pm         Urgent Care Locations Clinic Hours Telephone Numbers     [facility]  [address] [place] [address]     Monday-Friday:     11:00am - 9:00pm    Saturday-Sunday:  9:00am - 5:00pm   [phone]     [facility]  [address] [place] [address]     Monday-Friday:      5:00pm - 9:00pm     Saturday-Sunday:  9:00am - 5:00pm   [phone]

## 2018-06-25 NOTE — LETTER
6/25/2018         RE: Gayla Torres  [address] [address]        Dear Colleague,    Thank you for referring your patient, Gayla Torres, to the [facility]. Please see a copy of my visit note below.    History of Present Illness - Gayla Torres is a 63 year old female being seen for the first time, at the consultation of Dr. Harrison for an abnormal ear finding.  She tells me that for years she has had on and off she has had drainage from the ears that would come and go, always the RIGHT side.  The drainage is clear to yellow material, with an odor.  Antibiotics by mouth would clear it up .  This would happen about once per month.    She does note that she uses qtips.  No history of any ENT disease, no ENT surgery.  No changes in hearing or balance.      Past Medical History -   Patient Active Problem List   Diagnosis     CARDIOVASCULAR SCREENING; LDL GOAL LESS THAN 160     H/O bilateral breast implants     Tobacco use disorder     Cervical cancer screening     Sebaceous cyst       Current Medications -   Current Outpatient Prescriptions:      triamcinolone (KENALOG) 0.1 % ointment, Apply sparingly to affected area: bilateral hands, arms, forearms three times daily for 14 days., Disp: 80 g, Rfl: 0    Allergies - No Known Allergies    Social History -   Social History     Social History     Marital status:      Spouse name: N/A     Number of children: N/A     Years of education: N/A     Social History Main Topics     Smoking status: Current Every Day Smoker     Packs/day: 1.00     Smokeless tobacco: Never Used     Alcohol use Yes      Comment: social      Drug use: No     Sexual activity: Not Currently     Other Topics Concern     Not on file     Social History Narrative       Family History - No family history on file.    Review of Systems - As per HPI and PMHx, otherwise 10+ system review of the head and neck, and general constitution is negative.    Physical Exam  BP  "131/90  Pulse 64  Resp 12  Ht 1.575 m (5' 2\")  Wt 55.3 kg (122 lb)  SpO2 99%  BMI 22.31 kg/m2    General - The patient is well nourished and well developed, and appears to have good nutritional status.  Alert and oriented to person and place, answers questions and cooperates with examination appropriately.   Head and Face - Normocephalic and atraumatic, with no gross asymmetry noted of the contour of the facial features.  The facial nerve is intact, with strong symmetric movements.  Voice and Breathing - The patient was breathing comfortably without the use of accessory muscles. There was no wheezing, stridor, or stertor.  The patients voice was clear and strong, and had appropriate pitch and quality.  Ears - The LEFT ear canal and LEFT tympanic membrane are healthy and normal.  The RIGHT canal is slightly moist, but the RIGHT tympanic membrane has some scattered granulation and light yellow purulent material.  However there was no perforation visible, and she was able to inflate with valsalva.  Eyes - Extraocular movements intact, and the pupils were reactive to light.  Sclera were not icteric or injected, conjunctiva were pink and moist.  Mouth - Examination of the oral cavity showed pink, healthy oral mucosa. No lesions or ulcerations noted.  The tongue was mobile and midline, and the dentition were in good condition.    Throat - The walls of the oropharynx were smooth, pink, moist, symmetric, and had no lesions or ulcerations.  The tonsillar pillars and soft palate were symmetric.  The uvula was midline on elevation.    Neck - Normal midline excursion of the laryngotracheal complex during swallowing.  Full range of motion on passive movement.  Palpation of the occipital, submental, submandibular, internal jugular chain, and supraclavicular nodes did not demonstrate any abnormal lymph nodes or masses.  The carotid pulse was palpable bilaterally.  Palpation of the thyroid was soft and smooth, with no nodules " or goiter appreciated.  The trachea was mobile and midline.  Nose - External contour is symmetric, no gross deflection or scars.  Nasal mucosa is pink and moist with no abnormal mucus.  The septum was midline and non-obstructive, turbinates of normal size and position.  No polyps, masses, or purulence noted on examination.      DASHA/P Eddie Torres is a 63 year old female  (H73.11) Chronic myringitis of right ear  (primary encounter diagnosis)    This appears to be a purulent granulation of the RIGHT tympanic membrane.  I will treat with two weeks of ciprodex, and if that is not successful, return to clinic and we will consider getting a temporal bone CT.  I have also advised her to stop using qtips, which is the likely cause of this.    Finally, if this problem continues to recur, we may need to resort to long term use of drops intermittently.        Again, thank you for allowing me to participate in the care of your patient.        Sincerely,        Carlos Blood MD

## 2019-06-03 ENCOUNTER — OFFICE VISIT (OUTPATIENT)
Dept: FAMILY MEDICINE | Facility: CLINIC | Age: 64
End: 2019-06-03
Payer: MEDICARE

## 2019-06-03 VITALS
HEIGHT: 62 IN | BODY MASS INDEX: 22.08 KG/M2 | WEIGHT: 120 LBS | DIASTOLIC BLOOD PRESSURE: 74 MMHG | TEMPERATURE: 98.3 F | HEART RATE: 67 BPM | OXYGEN SATURATION: 100 % | SYSTOLIC BLOOD PRESSURE: 122 MMHG

## 2019-06-03 DIAGNOSIS — L30.9 DERMATITIS: ICD-10-CM

## 2019-06-03 DIAGNOSIS — Z12.11 SPECIAL SCREENING FOR MALIGNANT NEOPLASMS, COLON: ICD-10-CM

## 2019-06-03 DIAGNOSIS — Z23 NEED FOR PNEUMOCOCCAL VACCINATION: ICD-10-CM

## 2019-06-03 DIAGNOSIS — F32.1 MODERATE MAJOR DEPRESSION (H): ICD-10-CM

## 2019-06-03 DIAGNOSIS — D64.9 NORMOCYTIC ANEMIA: ICD-10-CM

## 2019-06-03 DIAGNOSIS — Z12.31 ENCOUNTER FOR SCREENING MAMMOGRAM FOR BREAST CANCER: ICD-10-CM

## 2019-06-03 DIAGNOSIS — Z00.01 ENCOUNTER FOR PREVENTATIVE ADULT HEALTH CARE EXAM WITH ABNORMAL FINDINGS: Primary | ICD-10-CM

## 2019-06-03 DIAGNOSIS — Z13.6 CARDIOVASCULAR SCREENING; LDL GOAL LESS THAN 160: ICD-10-CM

## 2019-06-03 LAB
ANION GAP SERPL CALCULATED.3IONS-SCNC: 7 MMOL/L (ref 3–14)
BUN SERPL-MCNC: 18 MG/DL (ref 7–30)
CALCIUM SERPL-MCNC: 8.6 MG/DL (ref 8.5–10.1)
CHLORIDE SERPL-SCNC: 109 MMOL/L (ref 94–109)
CHOLEST SERPL-MCNC: 177 MG/DL
CO2 SERPL-SCNC: 25 MMOL/L (ref 20–32)
CREAT SERPL-MCNC: 0.7 MG/DL (ref 0.52–1.04)
ERYTHROCYTE [DISTWIDTH] IN BLOOD BY AUTOMATED COUNT: 12.8 % (ref 10–15)
GFR SERPL CREATININE-BSD FRML MDRD: >90 ML/MIN/{1.73_M2}
GLUCOSE SERPL-MCNC: 87 MG/DL (ref 70–99)
HBA1C MFR BLD: 4.6 % (ref 0–5.6)
HCT VFR BLD AUTO: 34.2 % (ref 35–47)
HDLC SERPL-MCNC: 34 MG/DL
HGB BLD-MCNC: 10.9 G/DL (ref 11.7–15.7)
LDLC SERPL CALC-MCNC: 122 MG/DL
MCH RBC QN AUTO: 29.1 PG (ref 26.5–33)
MCHC RBC AUTO-ENTMCNC: 31.9 G/DL (ref 31.5–36.5)
MCV RBC AUTO: 91 FL (ref 78–100)
NONHDLC SERPL-MCNC: 143 MG/DL
PLATELET # BLD AUTO: 313 10E9/L (ref 150–450)
POTASSIUM SERPL-SCNC: 4 MMOL/L (ref 3.4–5.3)
RBC # BLD AUTO: 3.74 10E12/L (ref 3.8–5.2)
SODIUM SERPL-SCNC: 141 MMOL/L (ref 133–144)
TRIGL SERPL-MCNC: 104 MG/DL
TSH SERPL DL<=0.005 MIU/L-ACNC: 1.14 MU/L (ref 0.4–4)
WBC # BLD AUTO: 5.6 10E9/L (ref 4–11)

## 2019-06-03 PROCEDURE — 82728 ASSAY OF FERRITIN: CPT | Performed by: NURSE PRACTITIONER

## 2019-06-03 PROCEDURE — 99213 OFFICE O/P EST LOW 20 MIN: CPT | Mod: 25 | Performed by: NURSE PRACTITIONER

## 2019-06-03 PROCEDURE — 84443 ASSAY THYROID STIM HORMONE: CPT | Performed by: NURSE PRACTITIONER

## 2019-06-03 PROCEDURE — 83540 ASSAY OF IRON: CPT | Performed by: NURSE PRACTITIONER

## 2019-06-03 PROCEDURE — 80048 BASIC METABOLIC PNL TOTAL CA: CPT | Performed by: NURSE PRACTITIONER

## 2019-06-03 PROCEDURE — 83550 IRON BINDING TEST: CPT | Performed by: NURSE PRACTITIONER

## 2019-06-03 PROCEDURE — G0439 PPPS, SUBSEQ VISIT: HCPCS | Performed by: NURSE PRACTITIONER

## 2019-06-03 PROCEDURE — 80061 LIPID PANEL: CPT | Performed by: NURSE PRACTITIONER

## 2019-06-03 PROCEDURE — 90732 PPSV23 VACC 2 YRS+ SUBQ/IM: CPT | Performed by: NURSE PRACTITIONER

## 2019-06-03 PROCEDURE — 85027 COMPLETE CBC AUTOMATED: CPT | Performed by: NURSE PRACTITIONER

## 2019-06-03 PROCEDURE — 83036 HEMOGLOBIN GLYCOSYLATED A1C: CPT | Performed by: NURSE PRACTITIONER

## 2019-06-03 PROCEDURE — 36415 COLL VENOUS BLD VENIPUNCTURE: CPT | Performed by: NURSE PRACTITIONER

## 2019-06-03 PROCEDURE — G0009 ADMIN PNEUMOCOCCAL VACCINE: HCPCS | Performed by: NURSE PRACTITIONER

## 2019-06-03 RX ORDER — FLUOCINONIDE 0.5 MG/G
CREAM TOPICAL 2 TIMES DAILY
Qty: 60 G | Refills: 1 | Status: SHIPPED | OUTPATIENT
Start: 2019-06-03 | End: 2020-09-21

## 2019-06-03 ASSESSMENT — ENCOUNTER SYMPTOMS
CARDIOVASCULAR NEGATIVE: 1
MUSCULOSKELETAL NEGATIVE: 1
PARESTHESIAS: 1
CONSTITUTIONAL NEGATIVE: 1
ENDOCRINE NEGATIVE: 1
EYES NEGATIVE: 1
BREAST MASS: 0
RESPIRATORY NEGATIVE: 1
GASTROINTESTINAL NEGATIVE: 1
NEUROLOGICAL NEGATIVE: 1

## 2019-06-03 ASSESSMENT — PATIENT HEALTH QUESTIONNAIRE - PHQ9
SUM OF ALL RESPONSES TO PHQ QUESTIONS 1-9: 14
10. IF YOU CHECKED OFF ANY PROBLEMS, HOW DIFFICULT HAVE THESE PROBLEMS MADE IT FOR YOU TO DO YOUR WORK, TAKE CARE OF THINGS AT HOME, OR GET ALONG WITH OTHER PEOPLE: NOT DIFFICULT AT ALL
SUM OF ALL RESPONSES TO PHQ QUESTIONS 1-9: 14

## 2019-06-03 ASSESSMENT — PAIN SCALES - GENERAL: PAINLEVEL: NO PAIN (0)

## 2019-06-03 ASSESSMENT — MIFFLIN-ST. JEOR: SCORE: 1037.57

## 2019-06-03 ASSESSMENT — ACTIVITIES OF DAILY LIVING (ADL): CURRENT_FUNCTION: HOUSEWORK REQUIRES ASSISTANCE

## 2019-06-03 NOTE — NURSING NOTE
Screening Questionnaire for Adult Immunization    1. Are you sick today? No  2. Do  you have allergies to medications, food, a vaccine component, or latex? No   3. Have you had a serious reaction to a vaccine in the past? No   4. Do you have a long-term  health problem with lung, heart, kidney or metabolic disease(e.g., diabetes), asthma, or a blood disorder?  No  5.Do you have cancer,leukemia,HIV/AIDS, or any other immune system problem ? No  6. In the past 3 months, have you taken medications that weaken your immune sytem,      Such as cortisone, prednisone, other steroids, or anticancer drugs, or have you had radiation treatments ?No  7. Have you had a seizure or brain or other nervous system problems? No  8. During the past year, have you received a transfusion of blood or blood products, or been given immune      (gamma) globulin or an antiviral drug? No  9. For women: Are you pregnant or is there a chance you could become pregnant during the next month?No   10. Have you received any vaccinations in the past 4 weeks ? No    Immunization questionnaire answers were all negative.     Screening performed by patient/cm  VIS for Pneumovax 23 given on same date of administration.  Staff signature/Title: RUBIO Pavon MA  Prior to injection, verified patient identity using patient's name and date of birth.  Due to injection administration, patient instructed to remain in clinic for 15 minutes  afterwards, and to report any adverse reaction to me immediately.    Pnuemovax 23    Drug Amount Wasted:  None.  Vial/Syringe: Syringe  Expiration Date:

## 2019-06-03 NOTE — PATIENT INSTRUCTIONS
Stop using the Kenalog  Start using Lidex cream. Apply twice daily for 2 weeks and then stop  Keep skin well moisturized with a gentle moisturizer twice daily  If the rash returns you can apply the Lidex twice daily, 2 days a week (for example, Mondays and Thursdays)  If not improving (or worsening) let me know and I will refer you to dermatology

## 2019-06-04 LAB
FERRITIN SERPL-MCNC: 286 NG/ML (ref 8–252)
IRON SATN MFR SERPL: 23 % (ref 15–46)
IRON SERPL-MCNC: 62 UG/DL (ref 35–180)
TIBC SERPL-MCNC: 269 UG/DL (ref 240–430)

## 2019-06-04 ASSESSMENT — PATIENT HEALTH QUESTIONNAIRE - PHQ9: SUM OF ALL RESPONSES TO PHQ QUESTIONS 1-9: 14

## 2019-06-17 ENCOUNTER — ANCILLARY PROCEDURE (OUTPATIENT)
Dept: MAMMOGRAPHY | Facility: CLINIC | Age: 64
End: 2019-06-17
Attending: NURSE PRACTITIONER
Payer: MEDICARE

## 2019-06-17 DIAGNOSIS — Z12.31 ENCOUNTER FOR SCREENING MAMMOGRAM FOR BREAST CANCER: ICD-10-CM

## 2019-06-17 PROCEDURE — 77067 SCR MAMMO BI INCL CAD: CPT | Mod: TC

## 2019-11-27 ENCOUNTER — OFFICE VISIT (OUTPATIENT)
Dept: FAMILY MEDICINE | Facility: CLINIC | Age: 64
End: 2019-11-27
Payer: MEDICARE

## 2019-11-27 VITALS
BODY MASS INDEX: 22.13 KG/M2 | SYSTOLIC BLOOD PRESSURE: 99 MMHG | DIASTOLIC BLOOD PRESSURE: 67 MMHG | TEMPERATURE: 97.7 F | HEART RATE: 88 BPM | OXYGEN SATURATION: 100 % | WEIGHT: 121 LBS

## 2019-11-27 DIAGNOSIS — Z23 NEED FOR PROPHYLACTIC VACCINATION AND INOCULATION AGAINST INFLUENZA: ICD-10-CM

## 2019-11-27 DIAGNOSIS — Z23 NEED FOR SHINGLES VACCINE: ICD-10-CM

## 2019-11-27 DIAGNOSIS — Z72.0 TOBACCO ABUSE: ICD-10-CM

## 2019-11-27 DIAGNOSIS — H91.93 DECREASED HEARING OF BOTH EARS: ICD-10-CM

## 2019-11-27 DIAGNOSIS — R00.2 HEART PALPITATIONS: ICD-10-CM

## 2019-11-27 DIAGNOSIS — H69.93 DYSFUNCTION OF BOTH EUSTACHIAN TUBES: Primary | ICD-10-CM

## 2019-11-27 PROCEDURE — G0008 ADMIN INFLUENZA VIRUS VAC: HCPCS | Performed by: FAMILY MEDICINE

## 2019-11-27 PROCEDURE — 90662 IIV NO PRSV INCREASED AG IM: CPT | Performed by: FAMILY MEDICINE

## 2019-11-27 PROCEDURE — 93000 ELECTROCARDIOGRAM COMPLETE: CPT | Performed by: FAMILY MEDICINE

## 2019-11-27 PROCEDURE — 99214 OFFICE O/P EST MOD 30 MIN: CPT | Mod: 25 | Performed by: FAMILY MEDICINE

## 2019-11-27 RX ORDER — FLUTICASONE PROPIONATE 50 MCG
1 SPRAY, SUSPENSION (ML) NASAL
COMMUNITY
Start: 2019-11-27 | End: 2020-09-21

## 2019-11-27 ASSESSMENT — PAIN SCALES - GENERAL: PAINLEVEL: NO PAIN (0)

## 2019-11-27 NOTE — NURSING NOTE
Clinic Administered Medication Documentation    MEDICATION LIST:   Injectable Medication Documentation    Patient was given shingrix. Prior to medication administration, verified patients identity using patient s name and date of birth. Please see MAR and medication order for additional information. Patient instructed to remain in clinic for 15 minutes and report any adverse reaction to staff immediately .      Was entire vial of medication used? Yes  Vial/Syringe: Single dose vial  Expiration Date:  10-; Diluent: 03-  Was this medication supplied by the patient? No  Earline Freedman CMA on 11/27/2019 at 12:27 PM

## 2019-11-27 NOTE — PROGRESS NOTES
Subjective     Gayla Torres is a 64 year old female who presents to clinic today for the following health issues:    HPI :  Patient comes with 3 to 4 weeks history of fullness pressure in her ears.  She also reports history of decreased hearing.  She reports her left ear is worse.  She did report she was on a trip, flying to [place] a few weeks ago.  However, she is not sure the symptoms started after or before her trip.  She reports sometimes she feels her left ear is full, pressured.  It does open up if she, so sneeze.  She denies any fever, denies nasal drainage, denies sneezing, denies sore throat, chest pain, she has no frontal pressure.    Symptom of being dizzy, she feels heart palpitation once in a while.  Denies chest pain or pressure.  Denies lower extremity edema.  She has no pain or pressure when she walks or shortness of breath.  She is a smoker.  She is not ready to quit smoking at this point.  She does report history of anxiety, depression.  Denies suicidal thoughts or ideation.  She reports she sleeps well at night.  Does not wake up with panic attack.  Denies alcohol abuse or drugs abuse      Patient Active Problem List   Diagnosis     CARDIOVASCULAR SCREENING; LDL GOAL LESS THAN 160     H/O bilateral breast implants     Tobacco use disorder     Cervical cancer screening     Sebaceous cyst     History reviewed. No pertinent surgical history.    Social History     Tobacco Use     Smoking status: Current Every Day Smoker     Packs/day: 1.00     Smokeless tobacco: Never Used   Substance Use Topics     Alcohol use: Yes     Comment: social      History reviewed. No pertinent family history.      Current Outpatient Medications   Medication Sig Dispense Refill     fluticasone (FLONASE) 50 MCG/ACT nasal spray Spray 1 spray into both nostrils nightly as needed for rhinitis or allergies       loratadine-pseudoePHEDrine (CLARITIN-D 24-HOUR)  MG 24 hr tablet Take 1 tablet by mouth daily       zoster  vaccine recombinant adjuvanted (SHINGRIX) injection Inject 0.5 mLs into the muscle once for 1 dose 0.5 mL 1     fluocinonide (LIDEX) 0.05 % external cream Apply topically 2 times daily Apply to rash on arms (Patient not taking: Reported on 11/27/2019) 60 g 1     No Known Allergies    Reviewed and updated as needed this visit by Provider         Review of Systems   ROS COMP: Constitutional, HEENT, cardiovascular, pulmonary, gi and gu systems are negative, except as otherwise noted.      Objective    BP 99/67 (BP Location: Left arm, Patient Position: Chair, Cuff Size: Adult Regular)   Pulse 88   Temp 97.7  F (36.5  C) (Oral)   Wt 54.9 kg (121 lb)   SpO2 100%   Breastfeeding No   BMI 22.13 kg/m    Body mass index is 22.13 kg/m .  Physical Exam   GENERAL: healthy, alert and no distress  HENT: ear canals and TM's normal, nose and mouth without ulcers or lesions  NECK: no adenopathy, no asymmetry, masses, or scars and thyroid normal to palpation  RESP: lungs clear to auscultation - no rales, rhonchi or wheezes  CV: regular rate and rhythm, normal S1 S2, no S3 or S4, no murmur, click or rub, no peripheral edema and peripheral pulses strong  ABDOMEN: soft, nontender, no hepatosplenomegaly, no masses and bowel sounds normal  MS: no gross musculoskeletal defects noted, no edema    Diagnostic Test Results:  Labs reviewed in Epic  EKG - unremarkable        Assessment & Plan     1. Dysfunction of both eustachian tubes    - OTOLARYNGOLOGY REFERRAL  - loratadine-pseudoePHEDrine (CLARITIN-D 24-HOUR)  MG 24 hr tablet; Take 1 tablet by mouth daily  - fluticasone (FLONASE) 50 MCG/ACT nasal spray; Spray 1 spray into both nostrils nightly as needed for rhinitis or allergies    2. Need for prophylactic vaccination and inoculation against influenza    - INFLUENZA (HIGH DOSE) 3 VALENT VACCINE [52535]  - ADMIN INFLUENZA (For MEDICARE Patients ONLY) []  - zoster vaccine recombinant adjuvanted (SHINGRIX) injection; Inject 0.5  mLs into the muscle once for 1 dose  Dispense: 0.5 mL; Refill: 1    3. Decreased hearing of both ears    - OTOLARYNGOLOGY REFERRAL    4. Heart palpitations  Normal EKG, previous lab work from June of  this year was normal with normal cholesterol, normal thyroid, TSH.  Normal CBC, normal hemoglobin A1c.  I did discuss with the patient symptom could be related anxiety. Advised with supportive measure.  Follow up in the next a few weeks    - EKG 12-lead complete w/read - Clinics    5. Need for shingles vaccine    - SCREENING QUESTIONS FOR ADULT IMMUNIZATIONS    6. Tobacco abuse  Not ready to quite.       Tobacco Cessation:   reports that she has been smoking. She has been smoking about 1.00 pack per day. She has never used smokeless tobacco.  Tobacco Cessation Action Plan: Self help information given to patient        Patient Instructions     Patient Education     Planning to Quit Smoking  Your healthcare provider may have told you that you need to give up tobacco. Only you can decide if and when you are ready to quit. Quitting is hard to do. But the benefits will be worth it. When you decide to quit, come up with a plan that s right for you. Discuss your plan with your healthcare provider. And talk with your provider about medicines to help you quit.    Line up support  To quit smoking, you ll need a plan and some help. Pick a date in the next 2 to 4 weeks to quit. Use the time between now and that date to arrange for support.    Classes and counselors. Quit-smoking classes  people like you through the process. Get to know others in a class. And support each other beyond the class. Phone counseling also helps you keep on track. Ask your healthcare provider, local hospital, or public health department to put you in touch with a class and a phone counselor.    Family and friends. Tell your family and friends about your quit date. Ask them to support your change. If they smoke, only see them in smoke-free places.  Don't allow smoking in your home and car.  Be careful with these products  Finding something to replace cigarettes may be hard to do. Some things may be as harmful as cigarettes. These include:    Smokeless (chewing) tobacco. This is just as harmful as regular tobacco. Tobacco should not be used as a substitute for cigarettes.    Herbal medicines or teas. These may affect how your body handles nicotine. Talk with your healthcare provider before using these products.    E-cigarettes. These have less toxins than the smoke from a regular cigarette. But the FDA says that these devices may still have substances that can cause cancer. E-cigarettes are not well regulated. They have not been studied enough to know if they are a good aid to help you stop smoking. Talk with your healthcare provider before using these products.  Quit-smoking products  Many products can help you quit smoking. Some are prescription medicines that help curb your cravings and withdrawal symptoms. Other products slowly lessen the level of nicotine your body absorbs. Nicotine is the highly addictive substance found in cigarettes, cigars, and chewing tobacco. Nicotine replacement products can help get your body used to slowly decreasing amounts of nicotine after you quit smoking. These products include a nicotine patch, gum, lozenge, nasal spray, and inhaler. Always follow the directions for your medicine or product carefully. Your healthcare provider may tell you to start taking the prescription medicine a week before you plan to quit. Don't smoke while you use nicotine products. Doing so can harm your health.  For more information    National Cancer [place] Smoking Quitline, smokefree.gov/hofvh-tn-ye-expert,  877-44U-QUIT (210-057-9669)    Date Last Reviewed: 1/1/2019 2000-2018 [employer]. [address] [place] [address], [place] [address]. All rights reserved. This information is not intended as a substitute for professional medical  care. Always follow your healthcare professional's instructions.           Patient Education     How to Quit Smoking  Smoking is one of the hardest habits to break. About half of all people who have ever smoked have been able to quit. Most people who still smoke want to quit. Here are some of the best ways to stop smoking.    Keep trying  Most smokers make many attempts at quitting before they are successful. It s important not to give up.  Go cold turkey  Most former smokers quit cold turkey (all at once). Trying to cut back gradually doesn't seem to work as well, perhaps because it continues the smoking habit. Also, it is possible to inhale more while smoking fewer cigarettes. This results in the same amount of nicotine in your body.  Get support  Support programs can be a big help, especially for heavy smokers. These groups offer lectures, ways to change behavior, and peer support. Here are some ways to find a support program:    Free national quitline: 800-QUIT-NOW (043-810-0022).    [facility] quit-smoking programs.    American Lung Association: (647.739.8847).    American Cancer Society (908-154-2865).  Support at home is important too. Nonsmokers can offer praise and encouragement. If the smoker in your life finds it hard to quit, encourage them to keep trying.  Over-the-counter medicines  Nicotine replacement therapy may make quitting easier. Certain aids, such as the nicotine patch, gum, and lozenges, are available without a prescription. It is best to use these under a doctor s care, though. The skin patch provides a steady supply of nicotine. Nicotine gum and lozenges give temporary bursts of low levels of nicotine. Both methods reduce the craving for cigarettes. Warning: If you have nausea, vomiting, dizziness, weakness, or a fast heartbeat, stop using these products and see your doctor.  Prescription medicines  After reviewing your smoking patterns and past attempts to quit, your doctor may offer a  "prescription medicine such as bupropion, varenicline, a nicotine inhaler, or nasal spray. Each has advantages and side effects. Your doctor can review these with you.  Health benefits of quitting  The benefits of quitting start right away and keep improving the longer you go without smoking. These benefits occur at any age.  So whether you are 17 or 70, quitting is a good decision. Some of the benefits include:    20 minutes: Blood pressure and pulse return to normal.    8 hours: Oxygen levels return to normal.    2 days: Ability to smell and taste begin to improve as damaged nerves regrow.    2 to 3 weeks: Circulation and lung function improve.    1 to 9 months: Coughing, congestion, and shortness of breath decrease; tiredness decreases.    1 year: Risk of heart attack decreases by half.    5 years: Risk of lung cancer decreases by half; risk of stroke becomes the same as a nonsmoker s.  For more on how to quit smoking, try these online resources:     Smokefree.gov    \"Clearing the Air\" booklet from the National Cancer [place]: smokefree.gov/sites/default/files/pdf/clearing-the-air-accessible.pdf  Date Last Reviewed: 3/1/2017    8166-5315 [employer]. [address] [place] [address], [place] [address]. All rights reserved. This information is not intended as a substitute for professional medical care. Always follow your healthcare professional's instructions.               Return in about 7 months (around 6/27/2020) for Physical Exam.    Trey Michael MD  [facility]    "

## 2019-11-27 NOTE — PATIENT INSTRUCTIONS
Patient Education     Planning to Quit Smoking  Your healthcare provider may have told you that you need to give up tobacco. Only you can decide if and when you are ready to quit. Quitting is hard to do. But the benefits will be worth it. When you decide to quit, come up with a plan that s right for you. Discuss your plan with your healthcare provider. And talk with your provider about medicines to help you quit.    Line up support  To quit smoking, you ll need a plan and some help. Pick a date in the next 2 to 4 weeks to quit. Use the time between now and that date to arrange for support.    Classes and counselors. Quit-smoking classes  people like you through the process. Get to know others in a class. And support each other beyond the class. Phone counseling also helps you keep on track. Ask your healthcare provider, local hospital, or public health department to put you in touch with a class and a phone counselor.    Family and friends. Tell your family and friends about your quit date. Ask them to support your change. If they smoke, only see them in smoke-free places. Don't allow smoking in your home and car.  Be careful with these products  Finding something to replace cigarettes may be hard to do. Some things may be as harmful as cigarettes. These include:    Smokeless (chewing) tobacco. This is just as harmful as regular tobacco. Tobacco should not be used as a substitute for cigarettes.    Herbal medicines or teas. These may affect how your body handles nicotine. Talk with your healthcare provider before using these products.    E-cigarettes. These have less toxins than the smoke from a regular cigarette. But the FDA says that these devices may still have substances that can cause cancer. E-cigarettes are not well regulated. They have not been studied enough to know if they are a good aid to help you stop smoking. Talk with your healthcare provider before using these products.  Quit-smoking  products  Many products can help you quit smoking. Some are prescription medicines that help curb your cravings and withdrawal symptoms. Other products slowly lessen the level of nicotine your body absorbs. Nicotine is the highly addictive substance found in cigarettes, cigars, and chewing tobacco. Nicotine replacement products can help get your body used to slowly decreasing amounts of nicotine after you quit smoking. These products include a nicotine patch, gum, lozenge, nasal spray, and inhaler. Always follow the directions for your medicine or product carefully. Your healthcare provider may tell you to start taking the prescription medicine a week before you plan to quit. Don't smoke while you use nicotine products. Doing so can harm your health.  For more information    National Cancer [place] Smoking Quitline, Buzz[employer].gov/iwykn-mw-np-expert,  877-44U-QUIT (382-708-4390)    Date Last Reviewed: 1/1/2019 2000-2018 [employer]. [address] [place] [address]. All rights reserved. This information is not intended as a substitute for professional medical care. Always follow your healthcare professional's instructions.           Patient Education     How to Quit Smoking  Smoking is one of the hardest habits to break. About half of all people who have ever smoked have been able to quit. Most people who still smoke want to quit. Here are some of the best ways to stop smoking.    Keep trying  Most smokers make many attempts at quitting before they are successful. It s important not to give up.  Go cold turkey  Most former smokers quit cold turkey (all at once). Trying to cut back gradually doesn't seem to work as well, perhaps because it continues the smoking habit. Also, it is possible to inhale more while smoking fewer cigarettes. This results in the same amount of nicotine in your body.  Get support  Support programs can be a big help, especially for heavy smokers. These groups offer  lectures, ways to change behavior, and peer support. Here are some ways to find a support program:    Free national quitline: 800-QUIT-NOW (434-099-4775).    Hospital quit-smoking programs.    American Lung Association: (155.692.8692).    American Cancer Society (646-405-1779).  Support at home is important too. Nonsmokers can offer praise and encouragement. If the smoker in your life finds it hard to quit, encourage them to keep trying.  Over-the-counter medicines  Nicotine replacement therapy may make quitting easier. Certain aids, such as the nicotine patch, gum, and lozenges, are available without a prescription. It is best to use these under a doctor s care, though. The skin patch provides a steady supply of nicotine. Nicotine gum and lozenges give temporary bursts of low levels of nicotine. Both methods reduce the craving for cigarettes. Warning: If you have nausea, vomiting, dizziness, weakness, or a fast heartbeat, stop using these products and see your doctor.  Prescription medicines  After reviewing your smoking patterns and past attempts to quit, your doctor may offer a prescription medicine such as bupropion, varenicline, a nicotine inhaler, or nasal spray. Each has advantages and side effects. Your doctor can review these with you.  Health benefits of quitting  The benefits of quitting start right away and keep improving the longer you go without smoking. These benefits occur at any age.  So whether you are 17 or 70, quitting is a good decision. Some of the benefits include:    20 minutes: Blood pressure and pulse return to normal.    8 hours: Oxygen levels return to normal.    2 days: Ability to smell and taste begin to improve as damaged nerves regrow.    2 to 3 weeks: Circulation and lung function improve.    1 to 9 months: Coughing, congestion, and shortness of breath decrease; tiredness decreases.    1 year: Risk of heart attack decreases by half.    5 years: Risk of lung cancer decreases by half;  "risk of stroke becomes the same as a nonsmoker s.  For more on how to quit smoking, try these online resources:     Smokefree.gov    \"Clearing the Air\" booklet from the National Cancer [place]: smokefree.gov/sites/default/files/pdf/clearing-the-air-accessible.pdf  Date Last Reviewed: 3/1/2017    4358-2298 [employer]. [address] [place] [address]. All rights reserved. This information is not intended as a substitute for professional medical care. Always follow your healthcare professional's instructions.           "

## 2020-02-12 ENCOUNTER — TELEPHONE (OUTPATIENT)
Dept: FAMILY MEDICINE | Facility: CLINIC | Age: 65
End: 2020-02-12

## 2020-02-12 ENCOUNTER — ALLIED HEALTH/NURSE VISIT (OUTPATIENT)
Dept: NURSING | Facility: CLINIC | Age: 65
End: 2020-02-12
Payer: MEDICARE

## 2020-02-12 DIAGNOSIS — Z23 NEED FOR SHINGLES VACCINE: Primary | ICD-10-CM

## 2020-02-12 PROCEDURE — 99207 ZZC NO CHARGE NURSE ONLY: CPT

## 2020-02-12 PROCEDURE — 90471 IMMUNIZATION ADMIN: CPT

## 2020-02-12 ASSESSMENT — PATIENT HEALTH QUESTIONNAIRE - PHQ9: SUM OF ALL RESPONSES TO PHQ QUESTIONS 1-9: 6

## 2020-02-12 NOTE — NURSING NOTE
Prior to immunization administration, verified patients identity using patient s name and date of birth. Please see Immunization Activity for additional information.     Screening Questionnaire for Adult Immunization    Are you sick today?   No   Do you have allergies to medications, food, a vaccine component or latex?   No   Have you ever had a serious reaction after receiving a vaccination?   No   Do you have a long-term health problem with heart, lung, kidney, or metabolic disease (e.g., diabetes), asthma, a blood disorder, no spleen, complement component deficiency, a cochlear implant, or a spinal fluid leak?  Are you on long-term aspirin therapy?   No   Do you have cancer, leukemia, HIV/AIDS, or any other immune system problem?   No   Do you have a parent, brother, or sister with an immune system problem?   No   In the past 3 months, have you taken medications that affect  your immune system, such as prednisone, other steroids, or anticancer drugs; drugs for the treatment of rheumatoid arthritis, Crohn s disease, or psoriasis; or have you had radiation treatments?   No   Have you had a seizure, or a brain or other nervous system problem?   No   During the past year, have you received a transfusion of blood or blood    products, or been given immune (gamma) globulin or antiviral drug?   No   For women: Are you pregnant or is there a chance you could become       pregnant during the next month?   No   Have you received any vaccinations in the past 4 weeks?   No     Immunization questionnaire answers were all negative.        Per orders of Dr. Michael, injection of Second Shingrix vaccines given by Valerio River MA. Patient instructed to remain in clinic for 15 minutes afterwards, and to report any adverse reaction to me immediately.       Screening performed by Valerio River MA on 2/12/2020 at 12:09 PM.  VIS for  Shingrix vaccines given on same date of administration.  Staff signature/Title: Valerio River  MA on 2/12/2020 at 12:10 PM\

## 2020-02-12 NOTE — TELEPHONE ENCOUNTER
Panel Management Review      Patient has the following on her problem list: None      Composite cancer screening  Chart review shows that this patient is due/due soon for the following None  Summary:    Patient is due/failing the following:   PHQ9    Action needed:   Patient needs to do PHQ9. and completed on ancillary visit.    Type of outreach:    speak with patient to comlete PHQ9.    Questions for provider review:    None                                                                                                                                    Valerio River MA on 2/12/2020 at 12:12 PM       Chart routed to Care Team .

## 2020-09-21 ENCOUNTER — OFFICE VISIT (OUTPATIENT)
Dept: FAMILY MEDICINE | Facility: CLINIC | Age: 65
End: 2020-09-21
Payer: MEDICARE

## 2020-09-21 VITALS
DIASTOLIC BLOOD PRESSURE: 68 MMHG | HEIGHT: 62 IN | BODY MASS INDEX: 22.08 KG/M2 | SYSTOLIC BLOOD PRESSURE: 108 MMHG | WEIGHT: 120 LBS | OXYGEN SATURATION: 100 % | HEART RATE: 73 BPM | TEMPERATURE: 97.5 F

## 2020-09-21 DIAGNOSIS — D64.9 NORMOCYTIC ANEMIA: ICD-10-CM

## 2020-09-21 DIAGNOSIS — Z13.820 SCREENING FOR OSTEOPOROSIS: ICD-10-CM

## 2020-09-21 DIAGNOSIS — E55.9 VITAMIN D DEFICIENCY: ICD-10-CM

## 2020-09-21 DIAGNOSIS — F17.200 TOBACCO USE DISORDER: ICD-10-CM

## 2020-09-21 DIAGNOSIS — H25.019 CORTICAL AGE-RELATED CATARACT, UNSPECIFIED LATERALITY: ICD-10-CM

## 2020-09-21 DIAGNOSIS — E78.1 HYPERTRIGLYCERIDEMIA: ICD-10-CM

## 2020-09-21 DIAGNOSIS — Z78.0 ASYMPTOMATIC MENOPAUSAL STATE: ICD-10-CM

## 2020-09-21 DIAGNOSIS — Z00.00 ENCOUNTER FOR MEDICARE ANNUAL WELLNESS EXAM: Primary | ICD-10-CM

## 2020-09-21 DIAGNOSIS — Z12.11 SCREEN FOR COLON CANCER: ICD-10-CM

## 2020-09-21 PROBLEM — Z12.4 CERVICAL CANCER SCREENING: Status: RESOLVED | Noted: 2018-05-30 | Resolved: 2020-09-21

## 2020-09-21 PROBLEM — L72.3 SEBACEOUS CYST: Status: RESOLVED | Noted: 2018-06-14 | Resolved: 2020-09-21

## 2020-09-21 LAB
ERYTHROCYTE [DISTWIDTH] IN BLOOD BY AUTOMATED COUNT: 13.2 % (ref 10–15)
HCT VFR BLD AUTO: 36.3 % (ref 35–47)
HGB BLD-MCNC: 11.7 G/DL (ref 11.7–15.7)
MCH RBC QN AUTO: 28.5 PG (ref 26.5–33)
MCHC RBC AUTO-ENTMCNC: 32.2 G/DL (ref 31.5–36.5)
MCV RBC AUTO: 88 FL (ref 78–100)
PLATELET # BLD AUTO: 255 10E9/L (ref 150–450)
RBC # BLD AUTO: 4.11 10E12/L (ref 3.8–5.2)
WBC # BLD AUTO: 5.5 10E9/L (ref 4–11)

## 2020-09-21 PROCEDURE — 36415 COLL VENOUS BLD VENIPUNCTURE: CPT | Performed by: FAMILY MEDICINE

## 2020-09-21 PROCEDURE — 90662 IIV NO PRSV INCREASED AG IM: CPT | Performed by: FAMILY MEDICINE

## 2020-09-21 PROCEDURE — 82306 VITAMIN D 25 HYDROXY: CPT | Performed by: FAMILY MEDICINE

## 2020-09-21 PROCEDURE — 82728 ASSAY OF FERRITIN: CPT | Performed by: FAMILY MEDICINE

## 2020-09-21 PROCEDURE — 99213 OFFICE O/P EST LOW 20 MIN: CPT | Mod: 25 | Performed by: FAMILY MEDICINE

## 2020-09-21 PROCEDURE — G0008 ADMIN INFLUENZA VIRUS VAC: HCPCS | Performed by: FAMILY MEDICINE

## 2020-09-21 PROCEDURE — G0439 PPPS, SUBSEQ VISIT: HCPCS | Performed by: FAMILY MEDICINE

## 2020-09-21 PROCEDURE — 80061 LIPID PANEL: CPT | Performed by: FAMILY MEDICINE

## 2020-09-21 PROCEDURE — 85027 COMPLETE CBC AUTOMATED: CPT | Performed by: FAMILY MEDICINE

## 2020-09-21 RX ORDER — BUPROPION HYDROCHLORIDE 150 MG/1
TABLET ORAL
Qty: 60 TABLET | Refills: 1 | Status: SHIPPED | OUTPATIENT
Start: 2020-09-21 | End: 2020-10-19

## 2020-09-21 ASSESSMENT — ENCOUNTER SYMPTOMS
PARESTHESIAS: 0
HEMATOCHEZIA: 0
PALPITATIONS: 0
WEAKNESS: 0
JOINT SWELLING: 0
CONSTIPATION: 0
HEARTBURN: 0
FREQUENCY: 0
MYALGIAS: 0
HEMATURIA: 0
FEVER: 0
ABDOMINAL PAIN: 0
EYE PAIN: 0
DYSURIA: 0
NERVOUS/ANXIOUS: 1
DIARRHEA: 0
ARTHRALGIAS: 0
COUGH: 0
DIZZINESS: 0
SHORTNESS OF BREATH: 0
NAUSEA: 0
SORE THROAT: 0
HEADACHES: 0
BREAST MASS: 0
CHILLS: 0

## 2020-09-21 ASSESSMENT — MIFFLIN-ST. JEOR: SCORE: 1032.57

## 2020-09-21 ASSESSMENT — PAIN SCALES - GENERAL: PAINLEVEL: NO PAIN (0)

## 2020-09-21 ASSESSMENT — ACTIVITIES OF DAILY LIVING (ADL): CURRENT_FUNCTION: NO ASSISTANCE NEEDED

## 2020-09-21 NOTE — PATIENT INSTRUCTIONS
Learning to [place]      Coping is the key to successfully living a life without cigarettes.    You have unconsciously connected smoking with many behaviors and feeling that you experience every day of your life.  Engaging in that behavior or experiencing that feeling automatically triggers a desire for a cigarette. Unless you do something to prevent those urges from occurring and learn to deal with the urges that do occur, you may be tempted back to smoking. Coping breaks all those connections and allows you to live a life free of cigarettes.  Coping does not mean that you have to completely stop living your life and join a monastery! It does mean that you must work at changing how you do many of the routines that prompt you to smoke. It also means changing how you think in those tempting situations.  These techniques are all simple and doable. But they are powerful. Research and practical experience has proven time and again that these techniques help to eliminate urges as well as give you the tools to deal with urges that manage to slip through.  Throughout the next few pages you will find literally hundreds of suggestions on how to deal with situations that trigger most smokers to smoke.  Think about the situations where you have been especially tempted to smoke in the past. Refer to the coping suggestions for each of those situations. Then, determine the best coping choices for you. These techniques will be your  weapons of choice  the next time you encounter that situation. It is important to pick one coping technique to change what you do and one to change how you think for each trigger. Combining techniques makes them even stronger and increases your ability to successfully cope.  Even though there are plenty of excellent coping suggestions here, these are by no means all the techniques that exist. So, if you have an idea that s not listed here don t be afraid to use it. Be creative!  Finally remember that no  matter how many excellent ideas you come up with you must actually put them into practice. Work at this for at least six to eight weeks and you ll quickly learn to deal with any tempting situation that may come along!        Coping Menu    Preventing Urges    There are many things you can do before you get into a tempting situation to eliminate the urge to smoke.    Visualize yourself comfortably dealing with the situation without a cigarette.    Plan ahead. Know what you will do in any given situation before you encounter it. Practice that plan often.    Avoid the situation until you feel you can deal with it.    Change the routines you associate with smoking as much as possible.    Rethink your belief that smoking somehow makes your life better or helps you deal with all your problems.    Begin an exercise program. If you can t do anything else just walk as briskly as you can every day for half an hour.    Keep yourself busy. Avoid boring situations where you may begin to think about smoking.    Remind yourself often that you are happy being a nonsmoker and that life is much better without cigarettes.  Coping with temptation  However, sometimes the urge manages to come through. You must be ready to cope with that urge as it s happening. The following suggestions will help you deal with that urge so you aren t tempted back to cigarettes.  General Suggestions    Deep Breathing. Every time an urge hits take in a slow deep breath, hold it for three to five seconds and then slowly exhale.    Drink a glass of water.    Talk about the urge. Call your support person or let people around you know you need to talk for a few minutes.    Escape the situation. Leave until you feel comfortable going back.    Picture a stop sign in your head or say the word loudly to yourself.    Count to twenty!    Say to yourself,  I am in control  or  I can get through this.     Just accept the though. It s natural that you will have thoughts  about cigarettes once you quit. Don t make a big deal out of them. Say to yourself  So what  and let the thought go.    Specific Situations  After Meals    Get up from the table as soon as you are done eating    Brush your teeth after every meal    Always sit in the nonsmoking section of a restaurant    At home have dessert and coffee in a different place from dinner    Take a short walk after each meal  Alcohol    Explore alternative ways to socialize with friends  o Go to a movie  o Work out together  o Have a party without alcohol    If you choose to drink  o Change what you usually drink  o Limit yourself to one or two drinks  o Talk about the urges when they occur  o Leave the bar periodically for fresh air (Do some deep breathing while outside).    Decide not to go to a bar for at least the first few weeks of your quit    Remind yourself that you can have fun without drinking. Millions of people do it all the time!  Boredom    Always carry a book/newspaper/crossword puzzle with you    Plan ahead so that you will not have long periods of inactivity    Learn to enjoy doing nothing from time to time. You do not always have to be doing something important    Use idle time to make the grocery list, plan your schedule or write letters    Start a new hobby or begin an exercise program to fill the time.  Breaks    Take your break at a different time    Change the place where you take your break    Take a short walk instead of staying indoors    Do a crossword puzzle or read a novel    Realize that you don t need an excuse to take a break. You deserve it!      Car    Choose a slightly different route for routine trips    Remove the ashtray from the car    Listen to a talk radio station or books on tape to keep your mind occupied    Use public transportation for the first few weeks after you quit    Change the environment in the car. Clean the entire interior; get new seat covers, put up a no smoking sign,  etc.  Coffee    Drink a flavored coffee or a different brand    Drink coffee out of a glass, paper cup, or the good china you never use    Change where you have your coffee breaks at work    If you always have your morning coffee at home have it at a café or at work    Drink tea instead of coffee  Evenings    Find projects to do while at home. Clean out the basement, refinish furniture, etc.    Keep yourself occupied while watching TV. Do puzzles, make out the grocery list, read a magazine    Visit family or friends instead of staying at home    Begin a new hobby or volunteer at a [employer] organization    Start an exercise program. If you can t do anything else, take a brisk half hour walk each night  Hand/Mouth    Use cinnamon sticks (the kind used for cider)    Suck on sugar free candy    Use straws/swizzle sticks/tooth picks    Chew strong tangy sugar free gum    Eat carrots or celery sticks  Living with another smoker    Negotiate with the other smoker about where and when he/she will smoke. Do not make demands    Have the other smoker keep his/her cigarettes where you will not be able to find them    Practice saying  No thank you, I don t  smoke   just in case someone offers you a cigarette    Don t drink alcohol or limit yourself to one or two drinks    Have a support person with you at the party    Stress Management    Separate cigarettes from the situation. Realize that smoking never made a situation any better or helped you deal with it.    Step back, take a deep breath, and say to yourself,  I can handle this.  Then deal with the problem.    Strategize about how to handle stressful situations with friends, relatives or trusted clergy before encountering those situations.    Realize that every problem has a solution that does not involve smoking.    Begin an exercise program, take a formal stress management class or learn to meditate.   Telephone    Stand instead of sit    Move the location of the  phone    If you don t already have one, get a portable phone or a cell phone    Hold the phone in the hand opposite of the one you usually use    Limit your time on the phone (use email instead)!  Thoughts about smoking    Just because you think about something does not mean you have to do it. Remember, if you did everything you ever thought about you would be in shelter right now!!    Don t focus on the thought. Distract yourself:  o Say to yourself  I am in control  and let the thought go  o Remind yourself of the benefits of quitting  o Think of the reason you quit. Focus on that  o Say the word stop or picture a stop sign    Accept the thoughts. You naturally will be thinking about cigarettes for a while after you quit. Say to yourself,  So what  and move on    See yourself in your mind s eye as a successful nonsmoker. Practice seeing yourself in all kinds of situations dealing effectively without smoking    Give the smoker one ashtray. They will keep this ashtray clean and out of your sight when not in use    Determine a reasonable length of time for these changes    Surprise the smoker with a special dinner or gift at the end of your first month of quitting as a thank you for their cooperation.  Morning Routine    Change the order of your routine    Jump into the shower as soon as you get up    Eat something for breakfast if you normally do not    If you listen to the radio turn on the TV or vice versa    Look into the mirror first thing each morning and say,  I m proud to be a nonsmoker!   Negative Moods    Rethink your belief that cigarettes will calm or relax you    Ask yourself how a cigarette will make the situation any better    Do deep breathing throughout the day    As you do the deep breathing, think calming thoughts. Say to yourself,  I can get through this  or simply  I am calm.     Realize that smoking does not hurt anyone but yourself. Smoking is not a good way to  get back  at anyone or to punish  someone you are angry with  Other Smokers    Avoid places where you know people are smoking for the first few weeks of your quit    Leave the scene from time to time if you have to be in a smoking environment    Politely explain to the person that you are trying to quit and ask them not to smoke around you    Ask yourself what is still appealing about seeing other people smoke    Realize that the smoker is not happier or having more fun than you are just because they are smoking  Parties/Socializing    Before you go develop and practice a plan to deal with situations    Rehearse going to the function. Close your eyes and see yourself having a good time, meeting people, and enjoying the music all without a cigarette  Weight Gain    Do not diet.  Attempting two major behavior changes at the same time usually leads to failure at both. Wait at least two or three months after quitting before tackling any weight loss program.    Remember, the average weight gain, as a direct result of quitting, is only five to seven pounds. Any weight gain over and above that is due to behavioral changes on the part of the quitter    Drink six to eight glasses of water a day    Begin a modest exercise program. If you can do nothing else, take a brisk half hour walk every day    Remember, smoking does not turn your body into a fat burning machine. If it did, every smoker would be about 100 pounds!!!        Work    Rearrange your office or work space if you can    Put a  No Smoking  sign or motivation poster in your work area    Change your work routine as much as possible    Listen to music, talk radio, or tapes    Have a support person at work    Patient Education   Personalized Prevention Plan  You are due for the preventive services outlined below.  Your care team is available to assist you in scheduling these services.  If you have already completed any of these items, please share that information with your care team to update in your  medical record.  Health Maintenance Due   Topic Date Due     Osteoporosis Screening  1955     Colorectal Cancer Screening  06/04/2019     Annual Wellness Visit  06/03/2020     FALL RISK ASSESSMENT  06/03/2020     Flu Vaccine (1) 09/01/2020

## 2020-09-21 NOTE — LETTER
September 22, 2020      Gayla Torres  [address] [address]        Dear Ms.Melissa,    Your test results fall within the expected normal range(s) or remain unchanged from previous results.  Please continue with current treatment plan.    If you have any questions or concerns, please call the clinic at the number listed above.     Sincerely,        Mari Nuñez, DO/pv    Resulted Orders   CBC with platelets   Result Value Ref Range    WBC 5.5 4.0 - 11.0 10e9/L    RBC Count 4.11 3.8 - 5.2 10e12/L    Hemoglobin 11.7 11.7 - 15.7 g/dL    Hematocrit 36.3 35.0 - 47.0 %    MCV 88 78 - 100 fl    MCH 28.5 26.5 - 33.0 pg    MCHC 32.2 31.5 - 36.5 g/dL    RDW 13.2 10.0 - 15.0 %    Platelet Count 255 150 - 450 10e9/L   Ferritin   Result Value Ref Range    Ferritin 213 8 - 252 ng/mL   Lipid panel reflex to direct LDL Fasting   Result Value Ref Range    Cholesterol 183 <200 mg/dL    Triglycerides 149 <150 mg/dL    HDL Cholesterol 31 (L) >49 mg/dL    LDL Cholesterol Calculated 122 (H) <100 mg/dL      Comment:      Above desirable:  100-129 mg/dl  Borderline High:  130-159 mg/dL  High:             160-189 mg/dL  Very high:       >189 mg/dl      Non HDL Cholesterol 152 (H) <130 mg/dL      Comment:      Above Desirable:  130-159 mg/dl  Borderline high:  160-189 mg/dl  High:             190-219 mg/dl  Very high:       >219 mg/dl

## 2020-09-21 NOTE — PROGRESS NOTES
"SUBJECTIVE:   Gayla Torres is a 65 year old female who presents for Preventive Visit.      Patient has been advised of split billing requirements and indicates understanding: Yes   Are you in the first 12 months of your Medicare coverage?  No    Healthy Habits:     In general, how would you rate your overall health?  Good    Frequency of exercise:  4-5 days/week    Duration of exercise:  30-45 minutes    Do you usually eat at least 4 servings of fruit and vegetables a day, include whole grains    & fiber and avoid regularly eating high fat or \"junk\" foods?  No    Taking medications regularly:  Yes    Medication side effects:  None    Ability to successfully perform activities of daily living:  No assistance needed    Home Safety:  No safety concerns identified    Hearing Impairment:  Difficulty following a conversation in a noisy restaurant or crowded room, need to ask people to speak up or repeat themselves, difficulty understanding soft or whispered speech and difficulty understanding speech on the telephone    In the past 6 months, have you been bothered by leaking of urine?  No    In general, how would you rate your overall mental or emotional health?  Good      PHQ-2 Total Score: 2    Additional concerns today:  Yes        Do you feel safe in your environment? Yes    Have you ever done Advance Care Planning? (For example, a Health Directive, POLST, or a discussion with a medical provider or your loved ones about your wishes): No, advance care planning information given to patient to review.  Patient plans to discuss their wishes with loved ones or provider.        Fall risk  Fallen 2 or more times in the past year?: No  Any fall with injury in the past year?: No    Cognitive Screening   1) Repeat 3 items (Leader, Season, Table)    2) Clock draw: NORMAL  3) 3 item recall: Recalls 2 objects   Results: NORMAL clock, 1-2 items recalled: COGNITIVE IMPAIRMENT LESS LIKELY    Mini-CogTM Copyright S Calvin. Licensed " by the author for use in [facility]; reprinted with permission ([email or web address]). All rights reserved.      Do you have sleep apnea, excessive snoring or daytime drowsiness?: no    Reviewed and updated as needed this visit by clinical staff  Tobacco  Allergies  Meds  Med Hx  Surg Hx  Fam Hx  Soc Hx        Reviewed and updated as needed this visit by Provider        Social History     Tobacco Use     Smoking status: Current Every Day Smoker     Packs/day: 1.00     Smokeless tobacco: Never Used   Substance Use Topics     Alcohol use: Yes     Comment: social      If you drink alcohol do you typically have >3 drinks per day or >7 drinks per week? No    Alcohol Use 9/21/2020   Prescreen: >3 drinks/day or >7 drinks/week? No   Prescreen: >3 drinks/day or >7 drinks/week? -           Patient would like a referral to get her other eye cataract surgery done.  She had her right cataract removed but would like the left eye to be done.      Would like to discuss osteoporosis.  She is a smoker.  She is not on calcium.  She ahs two glasses of milk a day.  She is not on vitamin D.    Have her ears checked-- her right hear seems not to be hearing well.      She has a history of hypertriglyceridemia.    Current providers sharing in care for this patient include:   Patient Care Team:  Lani Brush APRN CNP as PCP - General (Nurse Practitioner - Adult Health)  Trey Michael MD as Assigned PCP    The following health maintenance items are reviewed in Epic and correct as of today:  Health Maintenance   Topic Date Due     DEXA  1955     COLORECTAL CANCER SCREENING  06/04/2019     MEDICARE ANNUAL WELLNESS VISIT  06/03/2020     FALL RISK ASSESSMENT  06/03/2020     INFLUENZA VACCINE (1) 09/01/2020     MAMMO SCREENING  06/17/2021     LIPID  06/03/2024     ADVANCE CARE PLANNING  06/03/2024     DTAP/TDAP/TD IMMUNIZATION (2 - Td) 08/04/2024     HEPATITIS C SCREENING  Completed     DEPRESSION ACTION PLAN   "Completed     PNEUMOCOCCAL IMMUNIZATION 65+ LOW/MEDIUM RISK  Completed     ZOSTER IMMUNIZATION  Completed     IPV IMMUNIZATION  Aged Out     MENINGITIS IMMUNIZATION  Aged Out     HEPATITIS B IMMUNIZATION  Aged Out     BP Readings from Last 3 Encounters:   09/21/20 108/68   11/27/19 99/67   06/03/19 122/74    Wt Readings from Last 3 Encounters:   09/21/20 54.4 kg (120 lb)   11/27/19 54.9 kg (121 lb)   06/03/19 54.4 kg (120 lb)                  Pneumonia Vaccine:Adults age 65+ who received Pneumovax (PPSV23) at 65 years or older: Should be given PCV13 > 1 year after their most recent PPSV23    Review of Systems   Constitutional: Negative for chills and fever.   HENT: Positive for hearing loss. Negative for congestion, ear pain and sore throat.    Eyes: Negative for pain and visual disturbance.   Respiratory: Negative for cough and shortness of breath.    Cardiovascular: Negative for chest pain, palpitations and peripheral edema.   Gastrointestinal: Negative for abdominal pain, constipation, diarrhea, heartburn, hematochezia and nausea.   Breasts:  Negative for tenderness, breast mass and discharge.   Genitourinary: Negative for dysuria, frequency, genital sores, hematuria, pelvic pain, urgency, vaginal bleeding and vaginal discharge.   Musculoskeletal: Negative for arthralgias, joint swelling and myalgias.   Skin: Positive for rash.   Neurological: Negative for dizziness, weakness, headaches and paresthesias.   Psychiatric/Behavioral: Negative for mood changes. The patient is nervous/anxious.          OBJECTIVE:   /68 (BP Location: Right arm, Patient Position: Sitting, Cuff Size: Adult Regular)   Pulse 73   Temp 97.5  F (36.4  C) (Oral)   Ht 1.575 m (5' 2\")   Wt 54.4 kg (120 lb)   SpO2 100%   BMI 21.95 kg/m   Estimated body mass index is 21.95 kg/m  as calculated from the following:    Height as of this encounter: 1.575 m (5' 2\").    Weight as of this encounter: 54.4 kg (120 lb).  Physical Exam  GENERAL: " healthy, alert and no distress  EYES: Eyes grossly normal to inspection, PERRL and conjunctivae and sclerae normal  HENT: ear canals and TM's normal, nose and mouth without ulcers or lesions  NECK: no adenopathy, no asymmetry, masses, or scars and thyroid normal to palpation  RESP: lungs clear to auscultation - no rales, rhonchi or wheezes  BREAST: normal without masses, tenderness or nipple discharge and no palpable axillary masses or adenopathy, breast implants bilaterally  CV: regular rate and rhythm, normal S1 S2, no S3 or S4, no murmur, click or rub, no peripheral edema and peripheral pulses strong  ABDOMEN: soft, nontender, no hepatosplenomegaly, no masses and bowel sounds normal  MS: no gross musculoskeletal defects noted, no edema  SKIN: no suspicious lesions or rashes  NEURO: Normal strength and tone, mentation intact and speech normal  PSYCH: mentation appears normal, affect normal/bright    Diagnostic Test Results:  Labs reviewed in Epic    ASSESSMENT / PLAN:   (Z00.00) Encounter for Medicare annual wellness exam  (primary encounter diagnosis)  Comment:   Plan:     (F17.200) Tobacco use disorder  Comment: The patient would like to quit smoking and is willing to try bupropion.  She also has mild depression but does not want to do a PHQ 9 for this.  I told her the Wellbutrin will help with smoking and depression  Plan: DEXA HIP/PELVIS/SPINE - Future, buPROPion         (WELLBUTRIN XL) 150 MG 24 hr tablet,         OFFICE/OUTPT VISIT,EST,LEVL III            (D64.9) Normocytic anemia  Comment: The patient had a fit test last year.  She did not have low iron from her labs last year.  She may need a colonoscopy but at this time a fit test was ordered  Plan: CBC with platelets, Ferritin, OFFICE/OUTPT         VISIT,EST,LEVL III            (H25.019) Cortical age-related cataract, unspecified laterality  Comment: Referred to Dr. Cheng  Plan: EYE ADULT REFERRAL, OFFICE/OUTPT VISIT,EST,LEVL        III         "    (E78.1) Hypertriglyceridemia  Comment: This was improved in 2019 but triglycerides were very high in 2018  Plan: Lipid panel reflex to direct LDL Fasting,         OFFICE/OUTPT VISIT,BOUBACAR BENAVIDEZL III            (Z13.820) Screening for osteoporosis  Comment:   Plan: DEXA HIP/PELVIS/SPINE - Future            (Z12.11) Screen for colon cancer  Comment:   Plan: Fecal colorectal cancer screen (FIT)            (E55.9) Vitamin D deficiency  Comment:   Plan: Vitamin D Deficiency            (Z78.0) Asymptomatic menopausal state   Comment:   Plan: DEXA HIP/PELVIS/SPINE - Future              Patient has been advised of split billing requirements and indicates understanding: No  COUNSELING:  Reviewed preventive health counseling, as reflected in patient instructions       Regular exercise       Healthy diet/nutrition    Estimated body mass index is 21.95 kg/m  as calculated from the following:    Height as of this encounter: 1.575 m (5' 2\").    Weight as of this encounter: 54.4 kg (120 lb).        She reports that she has been smoking. She has been smoking about 1.00 pack per day. She has never used smokeless tobacco.  Tobacco Cessation Action Plan:   Pharmacotherapies : Zyban/Wellbutrin      Appropriate preventive services were discussed with this patient, including applicable screening as appropriate for cardiovascular disease, diabetes, osteopenia/osteoporosis, and glaucoma.  As appropriate for age/gender, discussed screening for colorectal cancer, prostate cancer, breast cancer, and cervical cancer. Checklist reviewing preventive services available has been given to the patient.    Reviewed patients plan of care and provided an AVS. The Basic Care Plan (routine screening as documented in Health Maintenance) for Gayla meets the Care Plan requirement. This Care Plan has been established and reviewed with the Patient.    Counseling Resources:  ATP IV Guidelines  Pooled Cohorts Equation Calculator  Breast Cancer Risk " Calculator  Breast Cancer: Medication to Reduce Risk  FRAX Risk Assessment  ICSI Preventive Guidelines  Dietary Guidelines for Americans, 2010  USDA's MyPlate  ASA Prophylaxis  Lung CA Screening    Mari Nuñez DO  [facility]    Identified Health Risks:

## 2020-09-22 LAB
CHOLEST SERPL-MCNC: 183 MG/DL
DEPRECATED CALCIDIOL+CALCIFEROL SERPL-MC: 34 UG/L (ref 20–75)
FERRITIN SERPL-MCNC: 213 NG/ML (ref 8–252)
HDLC SERPL-MCNC: 31 MG/DL
LDLC SERPL CALC-MCNC: 122 MG/DL
NONHDLC SERPL-MCNC: 152 MG/DL
TRIGL SERPL-MCNC: 149 MG/DL

## 2020-10-05 DIAGNOSIS — Z12.11 SCREEN FOR COLON CANCER: ICD-10-CM

## 2020-10-05 PROCEDURE — 82274 ASSAY TEST FOR BLOOD FECAL: CPT | Performed by: FAMILY MEDICINE

## 2020-10-07 LAB — HEMOCCULT STL QL IA: POSITIVE

## 2020-10-08 ENCOUNTER — TELEPHONE (OUTPATIENT)
Dept: PEDIATRICS | Facility: CLINIC | Age: 65
End: 2020-10-08

## 2020-10-08 DIAGNOSIS — R19.5 POSITIVE FIT (FECAL IMMUNOCHEMICAL TEST): Primary | ICD-10-CM

## 2020-10-08 NOTE — TELEPHONE ENCOUNTER
Please let the patient know her fit test was positive so she will need to get a colonoscopy.  I have placed the order.     Mari Nuñez D.O.

## 2020-10-08 NOTE — TELEPHONE ENCOUNTER
Patient informed and explained what a positive FIT test was and what the next steps are.  Asked her to call the clinic back if she does not hear from them by Monday.        Ale Preciado RN

## 2020-10-19 ENCOUNTER — VIRTUAL VISIT (OUTPATIENT)
Dept: FAMILY MEDICINE | Facility: CLINIC | Age: 65
End: 2020-10-19
Payer: MEDICARE

## 2020-10-19 DIAGNOSIS — R10.9 STOMACH ACHE: Primary | ICD-10-CM

## 2020-10-19 DIAGNOSIS — R68.83 CHILLS: ICD-10-CM

## 2020-10-19 PROCEDURE — 99422 OL DIG E/M SVC 11-20 MIN: CPT | Performed by: NURSE PRACTITIONER

## 2020-10-19 NOTE — PROGRESS NOTES
"Gayla Torres is a 65 year old female who is being evaluated via a billable telephone visit.      The patient has been notified of following:     \"This telephone visit will be conducted via a call between you and your physician/provider. We have found that certain health care needs can be provided without the need for a physical exam.  This service lets us provide the care you need with a short phone conversation.  If a prescription is necessary we can send it directly to your pharmacy.  If lab work is needed we can place an order for that and you can then stop by our lab to have the test done at a later time.    Telephone visits are billed at different rates depending on your insurance coverage. During this emergency period, for some insurers they may be billed the same as an in-person visit.  Please reach out to your insurance provider with any questions.    If during the course of the call the physician/provider feels a telephone visit is not appropriate, you will not be charged for this service.\"    Patient has given verbal consent for Telephone visit?  Yes    What phone number would you like to be contacted at? [phone]    How would you like to obtain your AVS?     Subjective     Gayla Torres is a 65 year old female who presents via phone visit today for the following health issues:    HPI      Acute Illness  Acute illness concerns:  stomach pain, chills and gas & no appetite    Onset/Duration: 4 days   Symptoms:  Fever: no  Chills/Sweats: YES  Headache (location?): no  Sinus Pressure: no  Conjunctivitis:  no  Ear Pain: no  Rhinorrhea: no  Congestion: no  Sore Throat: no  Cough: no  Wheeze: no  Decreased Appetite: no  Nausea: YES- gassy   Vomiting: no  Diarrhea: no- but stools are black for 3 days now  Dysuria/Freq.: no  Dysuria or Hematuria: no  Fatigue/Achiness: no  Sick/Strep Exposure: no  Therapies tried and outcome: OTC gas relief medication and nyquil      Symptoms are Chills, body pain, sweating.  " Thought she had the stomach flu.  Poor appetite, has not eaten for 3 days.  Then started having stomach pain and gas pains.  Black stool on Friday and Saturday.  Yesterday was better with the stool but not quite back to normal.    She took pepto bismol last Friday and Satruday.    She goes to bed at 10 pm but the past 10 days she has had difficulty sleeping.  She believes that the disrupted sleep may have lead to the illness.  Wondering if she can take Benadryl PM.    Review of Systems   Constitutional, HEENT, cardiovascular, pulmonary, gi and gu systems are negative, except as otherwise noted.       Objective          Vitals:  No vitals were obtained today due to virtual visit.    healthy, alert and no distress  PSYCH: Alert and oriented times 3; coherent speech, normal   rate and volume, able to articulate logical thoughts, able   to abstract reason, no tangential thoughts, no hallucinations   or delusions  Her affect is normal  RESP: No cough, no audible wheezing, able to talk in full sentences  Remainder of exam unable to be completed due to telephone visits          Assessment/Plan:    Assessment & Plan     Stomach ache  Chills    - Symptomatic COVID-19 Virus (Coronavirus) by PCR; Future    Differentials:  Viral gastroenteritis, however coronavirus also on the differential given current pandemic.  Patient's symptoms are improving, she lives alone, and no longer works.  She will get the COVID test done if her symptoms do not continue to improve over the next 1-2 days.  Advised patient that she should self isolate for 2 weeks.  Supportive cares discussed.  Okay to take Tylenol PM to help with sleep, but advised against habitual use.      In regards to the black stool, I believe that could be due to recent use of Pepto Bismol.  Patient plans to get a colonoscopy done due to her recent FIT test was positive.   Phone number for her to call to schedule colonoscopy provided.      Return in about 1 week (around  10/26/2020) for if symptoms worsen or fail to improve.    Umu Orellana NP  [facility]    Phone call duration:  15 minutes with review of chart, review of patient concerns and review of ongoing plans.    Telephone visit (rather than a office visit) done today due to COVID-19 pandemic.

## 2022-05-27 ENCOUNTER — PATIENT OUTREACH (OUTPATIENT)
Dept: ONCOLOGY | Facility: CLINIC | Age: 67
End: 2022-05-27

## 2022-05-27 ENCOUNTER — OFFICE VISIT (OUTPATIENT)
Dept: FAMILY MEDICINE | Facility: CLINIC | Age: 67
End: 2022-05-27
Payer: COMMERCIAL

## 2022-05-27 VITALS
TEMPERATURE: 97.2 F | DIASTOLIC BLOOD PRESSURE: 76 MMHG | HEIGHT: 62 IN | WEIGHT: 124.8 LBS | SYSTOLIC BLOOD PRESSURE: 110 MMHG | HEART RATE: 67 BPM | RESPIRATION RATE: 20 BRPM | BODY MASS INDEX: 22.97 KG/M2 | OXYGEN SATURATION: 100 %

## 2022-05-27 DIAGNOSIS — Z13.1 SCREENING FOR DIABETES MELLITUS: ICD-10-CM

## 2022-05-27 DIAGNOSIS — Z12.31 VISIT FOR SCREENING MAMMOGRAM: ICD-10-CM

## 2022-05-27 DIAGNOSIS — H90.5 UNILATERAL SENSORINEURAL HEARING LOSS: ICD-10-CM

## 2022-05-27 DIAGNOSIS — Z00.00 ENCOUNTER FOR MEDICARE ANNUAL WELLNESS EXAM: Primary | ICD-10-CM

## 2022-05-27 DIAGNOSIS — Z87.891 PERSONAL HISTORY OF TOBACCO USE, PRESENTING HAZARDS TO HEALTH: ICD-10-CM

## 2022-05-27 DIAGNOSIS — Z78.0 ENCOUNTER FOR OSTEOPOROSIS SCREENING IN ASYMPTOMATIC POSTMENOPAUSAL PATIENT: ICD-10-CM

## 2022-05-27 DIAGNOSIS — Z12.11 SCREEN FOR COLON CANCER: ICD-10-CM

## 2022-05-27 DIAGNOSIS — Z80.41 FAMILY HISTORY OF MALIGNANT NEOPLASM OF OVARY: ICD-10-CM

## 2022-05-27 DIAGNOSIS — Z12.31 ENCOUNTER FOR SCREENING MAMMOGRAM FOR BREAST CANCER: ICD-10-CM

## 2022-05-27 DIAGNOSIS — F32.1 CURRENT MODERATE EPISODE OF MAJOR DEPRESSIVE DISORDER WITHOUT PRIOR EPISODE (H): ICD-10-CM

## 2022-05-27 DIAGNOSIS — Z13.820 ENCOUNTER FOR OSTEOPOROSIS SCREENING IN ASYMPTOMATIC POSTMENOPAUSAL PATIENT: ICD-10-CM

## 2022-05-27 PROCEDURE — 99214 OFFICE O/P EST MOD 30 MIN: CPT | Mod: 25 | Performed by: FAMILY MEDICINE

## 2022-05-27 PROCEDURE — 91305 COVID-19,PF,PFIZER (12+ YRS): CPT | Performed by: FAMILY MEDICINE

## 2022-05-27 PROCEDURE — 80048 BASIC METABOLIC PNL TOTAL CA: CPT | Performed by: FAMILY MEDICINE

## 2022-05-27 PROCEDURE — 99397 PER PM REEVAL EST PAT 65+ YR: CPT | Mod: 25 | Performed by: FAMILY MEDICINE

## 2022-05-27 PROCEDURE — 96127 BRIEF EMOTIONAL/BEHAV ASSMT: CPT | Performed by: FAMILY MEDICINE

## 2022-05-27 PROCEDURE — 36415 COLL VENOUS BLD VENIPUNCTURE: CPT | Performed by: FAMILY MEDICINE

## 2022-05-27 PROCEDURE — 0054A COVID-19,PF,PFIZER (12+ YRS): CPT | Performed by: FAMILY MEDICINE

## 2022-05-27 RX ORDER — BUPROPION HYDROCHLORIDE 150 MG/1
150 TABLET ORAL EVERY MORNING
Qty: 30 TABLET | Refills: 2 | Status: SHIPPED | OUTPATIENT
Start: 2022-05-27 | End: 2023-10-03

## 2022-05-27 ASSESSMENT — ENCOUNTER SYMPTOMS
WEAKNESS: 0
PARESTHESIAS: 0
COUGH: 0
DYSURIA: 0
HEMATOCHEZIA: 0
HEADACHES: 0
HEARTBURN: 1
DIARRHEA: 0
BREAST MASS: 0
CHILLS: 0
NAUSEA: 0
DIZZINESS: 1
ARTHRALGIAS: 0
HEMATURIA: 0
PALPITATIONS: 0
EYE PAIN: 0
SHORTNESS OF BREATH: 1
SORE THROAT: 0
JOINT SWELLING: 0
MYALGIAS: 0
ABDOMINAL PAIN: 0
NERVOUS/ANXIOUS: 1
FEVER: 0
CONSTIPATION: 0
FREQUENCY: 0

## 2022-05-27 ASSESSMENT — PATIENT HEALTH QUESTIONNAIRE - PHQ9
SUM OF ALL RESPONSES TO PHQ QUESTIONS 1-9: 11
SUM OF ALL RESPONSES TO PHQ QUESTIONS 1-9: 11
10. IF YOU CHECKED OFF ANY PROBLEMS, HOW DIFFICULT HAVE THESE PROBLEMS MADE IT FOR YOU TO DO YOUR WORK, TAKE CARE OF THINGS AT HOME, OR GET ALONG WITH OTHER PEOPLE: NOT DIFFICULT AT ALL

## 2022-05-27 ASSESSMENT — ACTIVITIES OF DAILY LIVING (ADL): CURRENT_FUNCTION: NO ASSISTANCE NEEDED

## 2022-05-27 ASSESSMENT — PAIN SCALES - GENERAL: PAINLEVEL: NO PAIN (0)

## 2022-05-27 NOTE — PATIENT INSTRUCTIONS
Depression--start Wellbutrin 150 mg daily in the morning you should see improvement in 2 to 3 weeks    See the  about your cancer risk.  Try and find out what types of cancers your family members  from    Toenails infection--I recommend Dr. Silva's piggy paste is the best topical treatment of nail fungus.    Try hard to quit smoking that should be easier with the Wellbutrin    Go see Dr. Blood for your ear.  Someone will call you to schedule this    Get your mammogram, bone density and lung cancer screening CAT scan    Follow-up with me with a telephone visit in 4 to 6 weeks    Mari Nuñez DO          Patient Education   Personalized Prevention Plan  You are due for the preventive services outlined below.  Your care team is available to assist you in scheduling these services.  If you have already completed any of these items, please share that information with your care team to update in your medical record.  Health Maintenance Due   Topic Date Due    Osteoporosis Screening  Never done    ANNUAL REVIEW OF HM ORDERS  Never done    LUNG CANCER SCREENING  Never done    Mammogram  2021    Colorectal Cancer Screening  10/05/2021    COVID-19 Vaccine (4 - Booster for Pfizer series) 2022       Signs of Hearing Loss      Hearing much better with one ear can be a sign of hearing loss.   Hearing loss is a problem shared by many people. In fact, it is one of the most common health problems, particularly as people age. Most people age 65 and older have some hearing loss. By age 80, almost everyone does. Hearing loss often occurs slowly over the years. So you may not realize your hearing has gotten worse.  Have your hearing checked  Call your healthcare provider if you:  Have to strain to hear normal conversation  Have to watch other people s faces very carefully to follow what they re saying  Need to ask people to repeat what they ve said  Often misunderstand what people are saying  Turn the volume of  the television or radio up so high that others complain  Feel that people are mumbling when they re talking to you  Find that the effort to hear leaves you feeling tired and irritated  Notice, when using the phone, that you hear better with one ear than the other  [employer] last reviewed this educational content on 1/1/2020 2000-2021 [employer]. All rights reserved. This information is not intended as a substitute for professional medical care. Always follow your healthcare professional's instructions.        Your Health Risk Assessment indicates you feel you are not in good emotional health.    Recreation   Recreation is not limited to sports and team events. It includes any activity that provides relaxation, interest, enjoyment, and exercise. Recreation provides an outlet for physical, mental, and social energy. It can give a sense of worth and achievement. It can help you stay healthy.    Mental Exercise and Social Involvement  Mental and emotional health is as important as physical health. Keep in touch with friends and family. Stay as active as possible. Continue to learn and challenge yourself.   Things you can do to stay mentally active are:  Learn something new, like a foreign language or musical instrument.   Play SCRABBLE or do crossword puzzles. If you cannot find people to play these games with you at home, you can play them with others on your computer through the Internet.   Join a games club--anything from card games to chess or checkers or lawn bowling.   Start a new hobby.   Go back to school.   Volunteer.   Read.   Keep up with world events.    Depression and Suicide in Older Adults    Nearly 2 million older Americans have some type of depression. Some of them even take their own lives. Yet depression among older adults is often ignored. Learn the warning signs. You may help spare a loved one needless pain. You may also save a life.   What is depression?  Depression is a common and  "serious illness that affects the way you think and feel. It is not a normal part of aging, nor is it a sign of weakness, a character flaw, or something you can snap out of. Most people with depression need treatment to get better. The most common symptom is a feeling of deep sadness. People who are depressed also may seem tired and listless. And nothing seems to give them pleasure. It s normal to grieve or be sad sometimes. But sadness lessens or passes with time. Depression rarely goes away or improves on its own. A person with clinical depression can't \"snap out of it.\" Other symptoms of depression are:   Sleeping more or less than normal  Eating more or less than normal  Having headaches, stomachaches, or other pains that don t go away  Feeling nervous,  empty,  or worthless  Crying a great deal  Thinking or talking about suicide or death  Loss of interest in activities previously enjoyed  Social isolation  Feeling confused or forgetful  What causes it?  The causes of depression aren t fully known. But it is thought to result from a complex blend of these factors:   Biochemistry. Certain chemicals in the brain play a role.  Genes. Depression does run in families.  Life stress. Life stresses can also trigger depression in some people. Older adults often face many stressors, such as death of friends or a spouse, health problems, and financial concerns.  Chronic conditions. This includes conditions such as diabetes, heart disease, or cancer. These can cause symptoms of depression. Medicine side effects can cause changes in thoughts and behaviors.  How you can help  Often, depressed people may not want to ask for help. When they do, they may be ignored. Or, they may receive the wrong treatment. You can help by showing parents and older friends love and support. If they seem depressed, don t lecture the person, ignore the symptoms, or discount the symptoms as a  normal  part of aging -which they are not. Get involved, " listen, and show interest and support.   Help them understand that depression is a treatable illness. Tell them you can help them find the right treatment. Offer to go to their healthcare provider's appointment with them for support when the symptoms are discussed. With their approval, contact a local mental health center, social service agency, or hospital about services.   You can be an advocate for him or her at healthcare appointments. Many older adults have chronic illnesses that can cause symptoms of depression. Medicine side effects can change thoughts and behaviors. You can help make sure that the healthcare provider looks at all of these factors. He or she should refer your family member or friend to a mental healthcare provider when needed. in some cases, untreated depression can lead to a misdiagnosis. A person may be diagnosed with a brain disorder such as dementia. If the healthcare provider does not take the issue of depression seriously, help your family member or friend to find another provider.   Don't be afraid to ask  If you think an older person you care about could be suicidal, ask,  Have you thought about suicide?  Most people will tell you the truth. If they say  yes,  they may already have a plan for how and when they will attempt it. Find out as much as you can. The more detailed the plan, and the easier it is to carry out, the more danger the person is in right now. Tell the person you are there for them and do not want them to harm him or herself. Don't wait to get help for the person. Call the person's healthcare provider, local hospital, or emergency services.   To learn more  National Suicide Prevention Lifeline (crisis hotline) 832-544-MFBN (262-055-7330)  National [place] of Mental Alrcvb578-558-8761xdp.[facility] Hospitalh.nih.gov  National [place] on Mental Jmsuntl903-885-3949tqt.jluis.org  Mental Health Nfvwkpv146-027-3067vcq.nmha.org  National Suicide Wgciwnq630-WSAGNLR (827-072-4789)    Call  911  Never leave the person alone. A person who is actively suicidal needs psychiatric care right away. They will need constant supervision. Never leave the person out of sight. Call 911 or the national 24-hour suicide crisis hotline at 359-858-BNZE (421-113-6987). You can also take the person to the closest emergency room.   [employer] last reviewed this educational content on 5/1/2020 2000-2021 [employer]. All rights reserved. This information is not intended as a substitute for professional medical care. Always follow your healthcare professional's instructions.

## 2022-05-27 NOTE — PROGRESS NOTES
Writer received referral, reviewed for appropriate plan, and sent to New Patient Scheduling for completion.    Christine Armenta, BSN, RN, PHN, OCN  Hematology/Oncology Nurse Navigator  [facility]  [phone]

## 2022-05-27 NOTE — LETTER
June 2, 2022      Gayla Y Ayse  [address] [address]        Dear ,    We are writing to inform you of your test results.    The results of your recent lab tests were within normal limits. Enclosed is a copy of these results.  If you have any further questions or problems, please contact our office.      Resulted Orders   Basic metabolic panel  (Ca, Cl, CO2, Creat, Gluc, K, Na, BUN)   Result Value Ref Range    Sodium 139 133 - 144 mmol/L    Potassium 3.9 3.4 - 5.3 mmol/L    Chloride 110 (H) 94 - 109 mmol/L    Carbon Dioxide (CO2) 24 20 - 32 mmol/L    Anion Gap 5 3 - 14 mmol/L    Urea Nitrogen 17 7 - 30 mg/dL    Creatinine 0.78 0.52 - 1.04 mg/dL    Calcium 9.2 8.5 - 10.1 mg/dL    Glucose 108 (H) 70 - 99 mg/dL    GFR Estimate 82 >60 mL/min/1.73m2      Comment:      Effective December 21, 2021 eGFRcr in adults is calculated using the 2021 CKD-EPI creatinine equation which includes age and gender ( et al., NEJM, DOI: 10.1056/WCWEat3184547)       If you have any questions or concerns, please call the clinic at the number listed above.     Sincerely,    Mari Nuñez DO/constantin

## 2022-05-27 NOTE — PROGRESS NOTES
"Bmp  SUBJECTIVE:   Gayla Tavera is a 67 year old female who presents for Preventive Visit.      Patient has been advised of split billing requirements and indicates understanding: Yes  Are you in the first 12 months of your Medicare coverage?  No    Healthy Habits:     In general, how would you rate your overall health?  Good    Frequency of exercise:  4-5 days/week    Duration of exercise:  45-60 minutes    Do you usually eat at least 4 servings of fruit and vegetables a day, include whole grains    & fiber and avoid regularly eating high fat or \"junk\" foods?  Yes    Taking medications regularly:  Yes    Medication side effects:  None    Ability to successfully perform activities of daily living:  No assistance needed    Home Safety:  No safety concerns identified    Hearing Impairment:  Feel that people are mumbling or not speaking clearly, need to ask people to speak up or repeat themselves and difficulty understanding soft or whispered speech    In the past 6 months, have you been bothered by leaking of urine?  No    In general, how would you rate your overall mental or emotional health?  Fair      PHQ-2 Total Score: 5    Additional concerns today:  Yes    Do you feel safe in your environment? Yes    Have you ever done Advance Care Planning? (For example, a Health Directive, POLST, or a discussion with a medical provider or your loved ones about your wishes): No, advance care planning information given to patient to review.  Advanced care planning was discussed at today's visit.      Fall risk  Fallen 2 or more times in the past year?: No  Any fall with injury in the past year?: No  +6    Cognitive Screening   1) Repeat 3 items (Leader, Season, Table)    2) Clock draw: ABNORMAL unable to complete clock  3) 3 item recall: Recalls 3 objects  Results: 3 items recalled: COGNITIVE IMPAIRMENT LESS LIKELY    Mini-CogTM Copyright S Calvin. Licensed by the author for use in [facility]; reprinted with " permission (josi@[facility]). All rights reserved.      Do you have sleep apnea, excessive snoring or daytime drowsiness?: no    Reviewed and updated as needed this visit by clinical staff   Tobacco  Allergies  Meds  Problems  Med Hx  Surg Hx  Fam Hx  Soc   Hx          Reviewed and updated as needed this visit by Provider    Allergies  Meds  Problems              Social History     Tobacco Use     Smoking status: Current Every Day Smoker     Packs/day: 1.00     Types: Other     Smokeless tobacco: Never Used     Tobacco comment: vapes, no cigarettes   Substance Use Topics     Alcohol use: Yes     Comment: social      If you drink alcohol do you typically have >3 drinks per day or >7 drinks per week? No    Alcohol Use 2022   Prescreen: >3 drinks/day or >7 drinks/week? No   Prescreen: >3 drinks/day or >7 drinks/week? -       Discuss ovarian cancer, patient had a sister who passed from ovarian cancer 2 months ago.   She has some bloating and gi symptoms.  This is mostly with dairy.  She is not sure how long this has been going on.  Her brothers both had  of cancer.   Father  of cancer of unknown age 70.      She has thick tonails     She is having trouble hearinng mostly out of the right ear.  This has been going on for many years.  She had a lot of ear drainage when she was a young adult.  She saw Dr Blood in 2018.  She was improved but it slowly returned.     She has had depression for many years.  She feels hopeless.  She has low motivation.      Current providers sharing in care for this patient include:   Patient Care Team:  Mari Nuñez DO as PCP - General (Family Medicine)  Mari Nuñez DO as Assigned PCP    The following health maintenance items are reviewed in Epic and correct as of today:  Health Maintenance Due   Topic Date Due     DEXA  Never done     LUNG CANCER SCREENING  Never done     MAMMO SCREENING  2021     COLORECTAL CANCER SCREENING  10/05/2021     BP Readings from Last 3  Encounters:   05/27/22 110/76   09/21/20 108/68   11/27/19 99/67    Wt Readings from Last 3 Encounters:   05/27/22 56.6 kg (124 lb 12.8 oz)   09/21/20 54.4 kg (120 lb)   11/27/19 54.9 kg (121 lb)                  Pneumonia Vaccine:For adults 65 years or older who do not have an immunocompromising condition, cerebrospinal fluid leak, or cochlear implant and want to receive PPSV23 ONLY: Administer 1 dose of PPSV23. Anyone who received any doses of PPSV23 before age 65 should receive 1 final dose of the vaccine at age 65 or older. Administer this last dose at least 5 years after the prior PPSV23 dose.    FHS-7:   Breast CA Risk Assessment (FHS-7) 5/27/2022   Did any of your first-degree relatives have breast or ovarian cancer? Yes   Did any of your relatives have bilateral breast cancer? No     click delete button to remove this line now  Mammogram Screening: Recommended mammography every 1-2 years with patient discussion and risk factor consideration  Pertinent mammograms are reviewed under the imaging tab.    Review of Systems   Constitutional: Negative for chills and fever.   HENT: Positive for hearing loss. Negative for congestion, ear pain and sore throat.    Eyes: Positive for visual disturbance. Negative for pain.   Respiratory: Positive for shortness of breath. Negative for cough.    Cardiovascular: Negative for chest pain, palpitations and peripheral edema.   Gastrointestinal: Positive for heartburn. Negative for abdominal pain, constipation, diarrhea, hematochezia and nausea.   Breasts:  Negative for tenderness, breast mass and discharge.   Genitourinary: Negative for dysuria, frequency, genital sores, hematuria, pelvic pain, urgency, vaginal bleeding and vaginal discharge.   Musculoskeletal: Negative for arthralgias, joint swelling and myalgias.   Skin: Negative for rash.   Neurological: Positive for dizziness. Negative for weakness, headaches and paresthesias.   Psychiatric/Behavioral: Positive for mood  "changes. The patient is nervous/anxious.          OBJECTIVE:   /76 (BP Location: Right arm, Patient Position: Chair, Cuff Size: Adult Regular)   Pulse 67   Temp 97.2  F (36.2  C) (Tympanic)   Resp 20   Ht 1.568 m (5' 1.73\")   Wt 56.6 kg (124 lb 12.8 oz)   SpO2 100%   BMI 23.02 kg/m   Estimated body mass index is 23.02 kg/m  as calculated from the following:    Height as of this encounter: 1.568 m (5' 1.73\").    Weight as of this encounter: 56.6 kg (124 lb 12.8 oz).  Physical Exam  GENERAL: healthy, alert and no distress  EYES: Eyes grossly normal to inspection, PERRL and conjunctivae and sclerae normal  HENT: ear canals and TM's normal, nose and mouth without ulcers or lesions  NECK: no adenopathy, no asymmetry, masses, or scars and thyroid normal to palpation  RESP: lungs clear to auscultation - no rales, rhonchi or wheezes  BREAST: normal without masses, tenderness or nipple discharge and no palpable axillary masses or adenopathy  CV: regular rate and rhythm, normal S1 S2, no S3 or S4, no murmur, click or rub, no peripheral edema and peripheral pulses strong  ABDOMEN: soft, nontender, no hepatosplenomegaly, no masses and bowel sounds normal  MS: no gross musculoskeletal defects noted, no edema  SKIN: Thickened yellow toenails, sebaceous cyst on back  NEURO: Normal strength and tone, mentation intact and speech normal  PSYCH: mentation appears normal, affect normal/bright    Diagnostic Test Results:  Labs reviewed in Epic    ASSESSMENT / PLAN:   (Z00.00) Encounter for Medicare annual wellness exam  (primary encounter diagnosis)  Comment:   Plan:     (Z12.31) Visit for screening mammogram  Comment:   Plan: MA Screen Bilateral w/Oumar          (Z12.11) Screen for colon cancer  Comment:   Plan: Patient had colonoscopy approximately 2 years ago signing release to get information    (Z80.41) Family history of malignant neoplasm of ovary  Comment: Refer to  because she has 3 siblings with cancers " "and her father  of cancer also  Plan: Cancer Risk Mgmt/Cancer Genetic Counseling         Referral, OFFICE/OUTPT VISIT,EST,LEVL IV            (Z13.820,  Z78.0) Encounter for osteoporosis screening in asymptomatic postmenopausal patient  Comment:   Plan: DEXA HIP/PELVIS/SPINE - Future            (H90.5) Unilateral sensorineural hearing loss  Comment: We will refer back to Dr. Blood as he is seen her for this in the past.  Plan: Adult ENT  Referral, OFFICE/OUTPT         VISIT,EST,LEVL IV            (F32.1) Current moderate episode of major depressive disorder without prior episode (H)  Comment: New diagnosis of depression.  We will start Wellbutrin 150 mg daily.  We will keep a low dose as the patient is concerned about starting medication at all.  We will follow-up in 4 to 6 weeks  Plan: buPROPion (WELLBUTRIN XL) 150 MG 24 hr tablet,         OFFICE/OUTPT VISIT,EST,LEVL IV            (Z13.1) Screening for diabetes mellitus  Comment:   Plan: Basic metabolic panel  (Ca, Cl, CO2, Creat,         Gluc, K, Na, BUN)          I have ordered lung cancer screening for her        COUNSELING:  Reviewed preventive health counseling, as reflected in patient instructions       Regular exercise       Healthy diet/nutrition       Fall risk prevention       Osteoporosis prevention/bone health       Advanced Planning     Estimated body mass index is 23.02 kg/m  as calculated from the following:    Height as of this encounter: 1.568 m (5' 1.73\").    Weight as of this encounter: 56.6 kg (124 lb 12.8 oz).        She reports that she has been smoking other. She has been smoking about 1.00 pack per day. She has never used smokeless tobacco.  Tobacco Cessation Action Plan:   Information offered: Patient not interested at this time      Appropriate preventive services were discussed with this patient, including applicable screening as appropriate for cardiovascular disease, diabetes, osteopenia/osteoporosis, and glaucoma.  As " appropriate for age/gender, discussed screening for colorectal cancer, prostate cancer, breast cancer, and cervical cancer. Checklist reviewing preventive services available has been given to the patient.    Reviewed patients plan of care and provided an AVS. The Basic Care Plan (routine screening as documented in Health Maintenance) for Gayla meets the Care Plan requirement. This Care Plan has been established and reviewed with the Patient.    Counseling Resources:  ATP IV Guidelines  Pooled Cohorts Equation Calculator  Breast Cancer Risk Calculator  Breast Cancer: Medication to Reduce Risk  FRAX Risk Assessment  ICSI Preventive Guidelines  Dietary Guidelines for Americans, 2010  [employer]'s MyPlate  ASA Prophylaxis  Lung CA Screening    DO [facility]    Identified Health Risks:    The patient was provided with written information regarding signs of hearing loss.  The patient was provided with suggestions to help her develop a healthy emotional lifestyle.  The patient s PHQ-9 score is consistent with moderate depression. She was provided with information regarding depression and was advised to schedule a follow up appointment in 6 weeks to further address this issue.

## 2022-05-28 LAB
ANION GAP SERPL CALCULATED.3IONS-SCNC: 5 MMOL/L (ref 3–14)
BUN SERPL-MCNC: 17 MG/DL (ref 7–30)
CALCIUM SERPL-MCNC: 9.2 MG/DL (ref 8.5–10.1)
CHLORIDE BLD-SCNC: 110 MMOL/L (ref 94–109)
CO2 SERPL-SCNC: 24 MMOL/L (ref 20–32)
CREAT SERPL-MCNC: 0.78 MG/DL (ref 0.52–1.04)
GFR SERPL CREATININE-BSD FRML MDRD: 82 ML/MIN/1.73M2
GLUCOSE BLD-MCNC: 108 MG/DL (ref 70–99)
POTASSIUM BLD-SCNC: 3.9 MMOL/L (ref 3.4–5.3)
SODIUM SERPL-SCNC: 139 MMOL/L (ref 133–144)

## 2022-06-07 ENCOUNTER — VIRTUAL VISIT (OUTPATIENT)
Dept: ONCOLOGY | Facility: CLINIC | Age: 67
End: 2022-06-07
Attending: FAMILY MEDICINE
Payer: COMMERCIAL

## 2022-06-07 DIAGNOSIS — Z80.0 FAMILY HISTORY OF COLON CANCER: Primary | ICD-10-CM

## 2022-06-07 DIAGNOSIS — Z80.41 FAMILY HISTORY OF MALIGNANT NEOPLASM OF OVARY: ICD-10-CM

## 2022-06-07 DIAGNOSIS — Z80.3 FAMILY HISTORY OF MALIGNANT NEOPLASM OF BREAST: ICD-10-CM

## 2022-06-07 PROCEDURE — 96040 HC GENETIC COUNSELING, EACH 30 MINUTES: CPT | Mod: TEL | Performed by: GENETIC COUNSELOR, MS

## 2022-06-07 NOTE — LETTER
6/7/2022         RE: Gayla Tavera  [address]  [place] [address]        Dear Colleague,    Thank you for referring your patient, Gayla Tavera, to the [facility]. Please see a copy of my visit note below.    6/7/2022    Gayla is a 67 year old who is being evaluated via a billable telephone visit. Pt is in [place].      What phone number would you like to be contacted at? [phone]  How would you like to obtain your AVS? MyChart  Phone call duration: 43 minutes    Beth GARCIA    Referring Provider: Mari Nuñez DO    Presenting Information:   Given concerns regarding the potential for COVID-19 exposure during a clinic visit, Gayla and her daughter elected for a telephone genetic counseling visit through the Cancer Risk Management Program to discuss her family history of breast, ovarian, and colon cancer. We reviewed this history, cancer screening recommendations, and available genetic testing options.    Personal History:  Gayla is a 67 year old female. She does not have any personal history of cancer.    She had her first menstrual period at age 14, her first child at age 18, and went through menopause in her early 50's. Gayla has her ovaries, fallopian tubes and uterus in place, and she has had no ovarian cancer screening to date. She reports that she has never used hormone replacement therapy.      She baseline mammogram in June 2019 was normal and her next exam is scheduled for 6/22/2022. Her fecal immunochemical test (FIT) in October 2020 was positive and Gayla reports that her follow-up colonoscopy may have identified polyps; it was recommended that she have another colonoscopy in 3 years. She does not regularly do any other cancer screening at this time.    Family History: (Please see scanned pedigree for detailed family history information)    One sister was recently diagnosed with and passed away from ovarian cancer at age 77; no genetic testing was  pursued.    One brother is 81 and was diagnosed with lung cancer; he has a history of smoking.    One brother was diagnosed with colon cancer in his 80's and passed away at age 87.    His daughter was diagnosed with breast cancer in her 40's and has likely not pursued genetic testing.    Gayla's father passed away in his 60/70's from an unknown illness versus cancer.    Gayla reports that she has limited information regarding her extended relatives on both sides of her family.    Her maternal and paternal ethnicity is Lao. There is no known Ashkenazi Anglican ancestry on either side of her family.    Discussion:    Gayla's family history of breast, ovarian, and colon cancer is suggestive of a hereditary cancer syndrome.    We reviewed the features of sporadic, familial, and hereditary cancers. In looking at Gayla's family history, it is possible that a cancer susceptibility gene is present as two siblings and one niece have been diagnosed with related cancers in two generations; her niece was also diagnosed under age 50. Though Gayla does not otherwise have a known family history of cancer, she reports having limited information regarding her extended relatives. This may cause an underestimation of the chance for an inherited cancer syndrome in her family.    We discussed the natural history and genetics of hereditary cancer, including Hereditary Breast and Ovarian Cancer (HBOC) syndrome.     We reviewed that the most common cause of hereditary breast and ovarian cancer is HBOC syndrome, which is caused by mutations in the BRCA1 and BRCA2 genes. Individuals with HBOC syndrome are at increased risk for several different cancers, including breast, ovarian, male breast, prostate, melanoma, and pancreatic cancer.    We discussed that there are additional genes that could cause increased risk for the cancers in her family. As many of these genes present with overlapping features in a family and accurate cancer  risk cannot always be established based upon the pedigree analysis alone, it would be reasonable for Gayla to consider panel genetic testing to analyze multiple genes at once.    Based on her family history, Gayla meets current National Comprehensive Cancer Network (NCCN) criteria for genetic testing of high penetrance ovarian and/or breast cancer genes (i.e. BRCA1, BRCA2, BRIP1, CDH1, PALB2, PTEN, RAD51C, RAD51D, TP53, Gomez syndrome genes, etc.).      A detailed handout regarding these genes/syndromes and the information we discussed was provided to Gayla at the end of our appointment today and can be found in the after visit summary. Topics included: inheritance pattern, cancer risks, cancer screening recommendations, and also risks, benefits and limitations of testing.    We discussed that genetic testing for  cancer susceptibility genes is typically most informative, though, when it is first performed on a family member with a personal history of cancer. Testing is available to Gayla, but with limitations. If Gayla pursues testing at this time and receives a negative result, this does not rule out the possibility of a hereditary cancer syndrome in her and/or her family. Despite these limitations and given that her siblings with cancer have passed away, Gayla expressed interest in proceeding with testing herself.    We reviewed genetic testing options for hereditary breast, gynecologic, colon, and related cancers: actionable breast/gynecologic/colon cancer panel ([facility] Cancers Guidelines-Based Panel + Colorectal Cancer Panel) and expanded pan-cancer panels ([facility] Common Hereditary Cancers Panel or Multi-Cancer Panel). Gayla expressed interest in only the actionable genes. She opted for the [facility] Breast [facility] G[facility] Cancers Guidelines-Based Panel + Colorectal Cancer Panel.    Genetic testing is available for 29 genes associated with cancers of the breast, ovary, uterus, and gastrointestinal  system: [facility] Breast [facility] Cancers Guidelines-Based Panel + Colorectal Cancer Panel (APC, YVONNE, AXIN2, BARD1, BMPR1A, BRCA1, BRCA2, BRIP1, CDH1, CHEK2, EPCAM, GREM1, MLH1, MSH2, MSH3, MSH6, MUTYH, NF1, NTHL1, PALB2, PMS2, POLD1, POLE, PTEN, RAD51C, RAD51D, SMAD4, STK11, TP53).      We discussed that many of these genes are associated with specific hereditary cancer syndromes and published management guidelines: Hereditary Breast and Ovarian Cancer syndrome (BRCA1, BRCA2), Gomez syndrome (MLH1, MSH2, MSH6, PMS2, EPCAM), Familial Adenomatous Polyposis (APC), Hereditary Diffuse Gastric Cancer (CDH1), Juvenile Polyposis syndrome (BMPR1A, SMAD4), Cowden syndrome (PTEN), Li Fraumeni syndrome (TP53), Peutz-Jeghers syndrome (STK11), MUTYH Associated Polyposis (MUTYH), and Neurofibromatosis type 1 (NF1).     The YVONNE, AXIN2, BARD1, BRIP1, CHEK2, GREM1, MSH3, NTHL1, PALB2, POLD1, POLE, RAD51C, and RAD51D genes are associated with increased cancer risk and have published management guidelines for certain cancers.     Consent was obtained over the phone and Gayla elected to have a saliva collection kit shipped to her home. Once her saliva sample is collected, genetic testing using the [facility] Cancers Guidelines-Based Panel + Colorectal Cancer Panel will be sent to [facility]. Turn around time: 2-3 weeks after [facility] receives her saliva sample.    Medical Management: For Gayla, we reviewed that the information from genetic testing may determine:    additional cancer screening for which Gayla may qualify (i.e. mammogram and breast MRI, more frequent colonoscopies, more frequent dermatologic exams, etc.),    options for risk reducing surgeries Gayla could consider (i.e. bilateral mastectomy, surgery to remove her ovaries and/or uterus, etc.),      and targeted chemotherapies if she were to develop certain cancers in the future (i.e. immunotherapy for individuals with Gomez syndrome, PARP inhibitors, etc.).     These  recommendations and possible targeted chemotherapies will be discussed in detail once genetic testing is completed.     Plan:  1) Today Gayla elected to proceed with genetic testing using the [facility] Breast [facility] Cancers Guidelines-Based Panel + Colorectal Cancer Panel offered by [facility]. A saliva collection kit will be shipped to her home.  2) The results should be available 2-3 weeks after [facility] receives her saliva sample.  3) Gayla will be contacted to schedule a virtual visit to discuss the results.        Again, thank you for allowing me to participate in the care of your patient.      Sincerely,    Carly Guidry, GC

## 2022-06-07 NOTE — PROGRESS NOTES
6/7/2022    Gayla is a 67 year old who is being evaluated via a billable telephone visit. Pt is in [place].      What phone number would you like to be contacted at? [phone]  How would you like to obtain your AVS? Lopezcharley  Phone call duration: 43 minutes    Beth GARCIA    Referring Provider: Mari Nuñez DO    Presenting Information:   Given concerns regarding the potential for COVID-19 exposure during a clinic visit, Gayla and her daughter elected for a telephone genetic counseling visit through the Cancer Risk Management Program to discuss her family history of breast, ovarian, and colon cancer. We reviewed this history, cancer screening recommendations, and available genetic testing options.    Personal History:  Gayla is a 67 year old female. She does not have any personal history of cancer.    She had her first menstrual period at age 14, her first child at age 18, and went through menopause in her early 50's. Gayla has her ovaries, fallopian tubes and uterus in place, and she has had no ovarian cancer screening to date. She reports that she has never used hormone replacement therapy.      She baseline mammogram in June 2019 was normal and her next exam is scheduled for 6/22/2022. Her fecal immunochemical test (FIT) in October 2020 was positive and Gayla reports that her follow-up colonoscopy may have identified polyps; it was recommended that she have another colonoscopy in 3 years. She does not regularly do any other cancer screening at this time.    Family History: (Please see scanned pedigree for detailed family history information)    One sister was recently diagnosed with and passed away from ovarian cancer at age 77; no genetic testing was pursued.    One brother is 81 and was diagnosed with lung cancer; he has a history of smoking.    One brother was diagnosed with colon cancer in his 80's and passed away at age 87.    His daughter was diagnosed with breast cancer in her 40's and has likely not  pursued genetic testing.    Gayla's father passed away in his 60/70's from an unknown illness versus cancer.    Gayla reports that she has limited information regarding her extended relatives on both sides of her family.    Her maternal and paternal ethnicity is [place]ic. There is no known Ashkenazi [place]ian ancestry on either side of her family.    Discussion:    Gayla's family history of breast, ovarian, and colon cancer is suggestive of a hereditary cancer syndrome.    We reviewed the features of sporadic, familial, and hereditary cancers. In looking at Gayla's family history, it is possible that a cancer susceptibility gene is present as two siblings and one niece have been diagnosed with related cancers in two generations; her niece was also diagnosed under age 50. Though Gayla does not otherwise have a known family history of cancer, she reports having limited information regarding her extended relatives. This may cause an underestimation of the chance for an inherited cancer syndrome in her family.    We discussed the natural history and genetics of hereditary cancer, including Hereditary Breast and Ovarian Cancer (HBOC) syndrome.     We reviewed that the most common cause of hereditary breast and ovarian cancer is HBOC syndrome, which is caused by mutations in the BRCA1 and BRCA2 genes. Individuals with HBOC syndrome are at increased risk for several different cancers, including breast, ovarian, male breast, prostate, melanoma, and pancreatic cancer.    We discussed that there are additional genes that could cause increased risk for the cancers in her family. As many of these genes present with overlapping features in a family and accurate cancer risk cannot always be established based upon the pedigree analysis alone, it would be reasonable for Gayla to consider panel genetic testing to analyze multiple genes at once.    Based on her family history, Gayla meets current National Comprehensive Cancer  Network (NCCN) criteria for genetic testing of high penetrance ovarian and/or breast cancer genes (i.e. BRCA1, BRCA2, BRIP1, CDH1, PALB2, PTEN, RAD51C, RAD51D, TP53, Gomez syndrome genes, etc.).      A detailed handout regarding these genes/syndromes and the information we discussed was provided to Gayla at the end of our appointment today and can be found in the after visit summary. Topics included: inheritance pattern, cancer risks, cancer screening recommendations, and also risks, benefits and limitations of testing.    We discussed that genetic testing for  cancer susceptibility genes is typically most informative, though, when it is first performed on a family member with a personal history of cancer. Testing is available to Gayla, but with limitations. If Gayla pursues testing at this time and receives a negative result, this does not rule out the possibility of a hereditary cancer syndrome in her and/or her family. Despite these limitations and given that her siblings with cancer have passed away, Gayla expressed interest in proceeding with testing herself.    We reviewed genetic testing options for hereditary breast, gynecologic, colon, and related cancers: actionable breast/gynecologic/colon cancer panel ([facility] Cancers Guidelines-Based Panel + Colorectal Cancer Panel) and expanded pan-cancer panels ([facility] Common Hereditary Cancers Panel or Multi-Cancer Panel). Gayal expressed interest in only the actionable genes. She opted for the [facility] Cancers Guidelines-Based Panel + Colorectal Cancer Panel.    Genetic testing is available for 29 genes associated with cancers of the breast, ovary, uterus, and gastrointestinal system: [facility] Cancers Guidelines-Based Panel + Colorectal Cancer Panel (APC, YVONNE, AXIN2, BARD1, BMPR1A, BRCA1, BRCA2, BRIP1, CDH1, CHEK2, EPCAM, GREM1, MLH1, MSH2, MSH3, MSH6, MUTYH, NF1, NTHL1, PALB2, PMS2, POLD1, POLE, PTEN, RAD51C, RAD51D,  SMAD4, STK11, TP53).      We discussed that many of these genes are associated with specific hereditary cancer syndromes and published management guidelines: Hereditary Breast and Ovarian Cancer syndrome (BRCA1, BRCA2), Gomez syndrome (MLH1, MSH2, MSH6, PMS2, EPCAM), Familial Adenomatous Polyposis (APC), Hereditary Diffuse Gastric Cancer (CDH1), Juvenile Polyposis syndrome (BMPR1A, SMAD4), Cowden syndrome (PTEN), Li Fraumeni syndrome (TP53), Peutz-Jeghers syndrome (STK11), MUTYH Associated Polyposis (MUTYH), and Neurofibromatosis type 1 (NF1).     The YVONNE, AXIN2, BARD1, BRIP1, CHEK2, GREM1, MSH3, NTHL1, PALB2, POLD1, POLE, RAD51C, and RAD51D genes are associated with increased cancer risk and have published management guidelines for certain cancers.     Consent was obtained over the phone and Gayla elected to have a saliva collection kit shipped to her home. Once her saliva sample is collected, genetic testing using the [facility] Cancers Guidelines-Based Panel + Colorectal Cancer Panel will be sent to [facility]. Turn around time: 2-3 weeks after Cristofer receives her saliva sample.    Medical Management: For Gayla, we reviewed that the information from genetic testing may determine:    additional cancer screening for which Gayla may qualify (i.e. mammogram and breast MRI, more frequent colonoscopies, more frequent dermatologic exams, etc.),    options for risk reducing surgeries Gayla could consider (i.e. bilateral mastectomy, surgery to remove her ovaries and/or uterus, etc.),      and targeted chemotherapies if she were to develop certain cancers in the future (i.e. immunotherapy for individuals with Gomez syndrome, PARP inhibitors, etc.).     These recommendations and possible targeted chemotherapies will be discussed in detail once genetic testing is completed.     Plan:  1) Today Gayla elected to proceed with genetic testing using the [facility] Cancers Guidelines-Based Panel + Colorectal  Cancer Panel offered by [facility]. A saliva collection kit will be shipped to her home.  2) The results should be available 2-3 weeks after Cristofer receives her saliva sample.  3) Gayla will be contacted to schedule a virtual visit to discuss the results.    Carly Guidry MS, [facility]  Licensed, Certified Genetic Counselor  Office: [phone]  Pager: [phone]

## 2022-06-09 NOTE — PATIENT INSTRUCTIONS
Assessing Cancer Risk  Only about 5-10% of cancers are thought to be due to an inherited cancer susceptibility gene.    These families often have:  Several people with the same or related types of cancer  Cancers diagnosed at a young age (before age 50)  Individuals with more than one primary cancer  Multiple generations of the family affected with cancer    Some people may be candidates for genetic testing of more than one gene.  For these families, genetic testing using a cancer panel may be offered.  These panels will test different genes known to increase the risk for breast, ovarian, uterine, and/or other cancers. All of the genes discussed below have published clinical management guidelines for individuals who are found to carry a mutation. The purpose of this handout is to serve as a brief summary of the genes analyzed by the panels used to inquire about hereditary breast and gynecologic cancer:  YVONNE, BRCA1, BRCA2, BRIP1, CDH1, CHEK2, MLH1, MSH2, MSH6, PMS2, EPCAM, PTEN, PALB2, RAD51C, RAD51D, and TP53.  ______________________________________________________________________________  Hereditary Breast and Ovarian Cancer Syndrome   (BRCA1 and BRCA2)  A single mutation in one of the copies of BRCA1 or BRCA2 increases the risk for breast and ovarian cancer, among others.  The risk for pancreatic cancer and melanoma may also be slightly increased in some families.  The chart below shows the chance that someone with a BRCA mutation would develop cancer in his or her lifetime1,2,3,4.        A person s ethnic background is also important to consider, as individuals of Ashkenazi Jainism ancestry have a higher chance of having a BRCA gene mutation.  There are three BRCA mutations that occur more frequently in this population.    Gomez Syndrome   (MLH1, MSH2, MSH6, PMS2, and EPCAM)  Currently five genes are known to cause Gomez Syndrome: MLH1, MSH2, MSH6, PMS2, and EPCAM.  A single mutation in one of the Gomez  Syndrome genes increases the risk for colon, endometrial, ovarian, and stomach cancers.  Other cancers that occur less commonly in Gomez Syndrome include urinary tract, skin, and brain cancers.  The chart below shows the chance that a person with Gomez syndrome would develop cancer in his or her lifetime5.      *Cancer risk varies depending on Gomez syndrome gene found    Cowden Syndrome   (PTEN)  Cowden syndrome is a hereditary condition that increases the risk for breast, thyroid, endometrial, colon, and kidney cancer.  Cowden syndrome is caused by a mutation in the PTEN gene.  A single mutation in one of the copies of PTEN causes Cowden syndrome and increases cancer risk.  The chart below shows the chance that someone with a PTEN mutation would develop cancer in their lifetime6,7.  Other benign features seen in some individuals with Cowden syndrome include benign skin lesions (facial papules, keratoses, lipomas), learning disability, autism, thyroid nodules, colon polyps, and larger head size.      *One recent study found breast cancer risk to be increased to 85%    Li-Fraumeni Syndrome   (TP53)  Li-Fraumeni Syndrome (LFS) is a cancer predisposition syndrome caused by a mutation in the TP53 gene. A single mutation in one of the copies of TP53 increases the risk for multiple cancers. Individuals with LFS are at an increased risk for developing cancer at a young age. The lifetime risk for development of a LFS-associated cancer is 50% by age 30 and 90% by age 60.   Core Cancers: Sarcomas, Breast, Brain, Lung, Leukemias/Lymphomas, Adrenocortical carcinomas  Other Cancers: Gastrointestinal, Thyroid, Skin, Genitourinary    Hereditary Diffuse Gastric Cancer   (CDH1)  Currently, one gene is known to cause hereditary diffuse gastric cancer (HDGC): CDH1.  Individuals with HDGC are at increased risk for diffuse gastric cancer and lobular breast cancer. Of people diagnosed with HDGC, 30-50% have a mutation in the CDH1 gene.   This suggests there are likely other genes that may cause HDGC that have not been identified yet.      Lifetime Cancer Risks    General Population HDGC    Diffuse Gastric  <1% ~80%   Breast 12% 39-52%         Additional Genes  YVONNE  YVONNE is a moderate-risk breast cancer gene. Women who have a mutation in YVONNE can have between a 2-4 fold increased risk for breast cancer compared to the general population8. YVONNE mutations have also been associated with increased risk for pancreatic cancer, however an estimate of this cancer risk is not well understood9. Individuals who inherit two YVONNE mutations have a condition called ataxia-telangiectasia (AT).  This rare autosomal recessive condition affects the nervous system and immune system, and is associated with progressive cerebellar ataxia beginning in childhood.  Individuals with ataxia-telangiectasia often have a weakened immune system and have an increased risk for childhood cancers.    PALB2  Mutations in PALB2 have been shown to increase the risk of breast cancer up to 33-58% in some families; where individuals fall within this risk range is dependent upon family kascood26. PALB2 mutations have also been associated with increased risk for pancreatic cancer, although this risk has not been quantified yet.  Individuals who inherit two PALB2 mutations--one from their mother and one from their father--have a condition called Fanconi Anemia.  This rare autosomal recessive condition is associated with short stature, developmental delay, bone marrow failure, and increased risk for childhood cancers.    CHEK2   CHEK2 is a moderate-risk breast cancer gene.  Women who have a mutation in CHEK2 have around a 2-fold increased risk for breast cancer compared to the general population, and this risk may be higher depending upon family history.11,12,13 Mutations in CHEK2 have also been shown to increase the risk of a number of other cancers, including colon and prostate, however these  cancer risks are currently not well understood.    BRIP1, RAD51C and RAD51D  Mutations in BRIP1, RAD51C, and RAD51D have been shown to increase the risk of ovarian cancer and possibly female breast cancer as well14,15 .       Lifetime Cancer Risk    General Population BRIP1 RAD51C RAD51D   Ovarian 1-2% ~5-8% ~5-9% ~7-15%           Inheritance  All of the cancer syndromes reviewed above are inherited in an autosomal dominant pattern.  This means that if a parent has a mutation, each of his or her children will have a 50% chance of inheriting that same mutation.  Therefore, each child--male or female--would have a 50% chance of being at increased risk for developing cancer.      Image obtained from Genetics Home Reference, 2013     Mutations in some genes can occur de vicky, which means that a person s mutation occurred for the first time in them and was not inherited from a parent.  Now that they have the mutation, however, it can be passed on to future generations.    Genetic Testing  Genetic testing involves a blood test and will look at the genetic information in the YVONNE, BRCA1, BRCA2, BRIP1, CDH1, CHEK2, MLH1, MSH2, MSH6, PMS2, EPCAM, PTEN, PALB2, RAD51C, RAD51D, and TP53 genes for any harmful mutations that are associated with increased cancer risk.  If possible, it is recommended that the person(s) who has had cancer be tested before other family members.  That person will give us the most useful information about whether or not a specific gene is associated with the cancer in the family.    Results  There are three possible results of genetic testing:  Positive--a harmful mutation was identified in one or more of the genes  Negative--no mutation was identified in any of the genes on this panel  Variant of unknown significance--a variation in one of the genes was identified, but it is unclear how this impacts cancer risk in the family    Advantages and Disadvantages   There are advantages and disadvantages to  genetic testing.    Advantages  May clarify your cancer risk  Can help you make medical decisions  May explain the cancers in your family  May give useful information to your family members (if you share your results)    Disadvantages  Possible negative emotional impact of learning about inherited cancer risk  Uncertainty in interpreting a negative test result in some situations  Possible genetic discrimination concerns (see below)    Genetic Information Nondiscrimination Act (BRITNI)  BRITNI is a federal law that protects individuals from health insurance or employment discrimination based on a genetic test result alone.  Although rare, there are currently no legal discrimination protections in terms of life insurance, long term care, or disability insurances.  Visit the [employer] website to learn more.    Reducing Cancer Risk  All of the genes described above have nationally recognized cancer screening guidelines that would be recommended for individuals who test positive.  In addition to increased cancer screening, surgeries may be offered or recommended to reduce cancer risk.  Recommendations are based upon an individual s genetic test result as well as their personal and family history of cancer.    Questions to Think About Regarding Genetic Testing:  What effect will the test result have on me and my relationship with my family members if I have an inherited gene mutation?  If I don t have a gene mutation?  Should I share my test results, and how will my family react to this news, which may also affect them?  Are my children ready to learn new information that may one day affect their own health?    Hereditary Cancer Resources    FORCE: Facing Our Risk of Cancer Empowered facingourrisk.org   Bright Pink bebrightpink.org   Li-Fraumeni Syndrome Association lfsassociation.org   PTEN World PTENworld.Chicfy   No stomach for cancer, Inc. nostomachforcancer.org   Stomach cancer relief network  Scrnet.org   Collaborative Group of the Americas on Inherited Colorectal Cancer (CGA) cgaicc.com    Cancer Care cancercare.org   American Cancer Society (ACS) cancer.org   National Cancer [place] (NCI) cancer.gov     Please call us if you have any questions or concerns.   Cancer Risk Management Program [phone]-CANCER ([phone])  Srinivasa Castro, MS [facility] [phone]  Alaina Will, MS, [facility] [phone]  Shanelle Chapa, MS, [facility]  [phone]  Hillary Melendez, MS, [facility]  [phone]  Tiffany Meliton, MS, [facility]  [phone]  Carly Guidry, MS, [facility] [phone]  Radha Jiménez, MS, [facility] [phone]    References  Shirley Bah PDP, Mony S, Xenia OSWALD, Stephan JE, Anca JL, Preston N, Chelle H, Moise O, Joan A, Bo B, Scarlet P, Manleighton S, Merary DM, Jerome N, Roberth E, Daniel H, Cooper E, Lubinski J, Gronwald J, Lisa B, Rodriguez H, Thorlacius S, Eerola H, Josue H, Sita K, Carson OP. Average risks of breast and ovarian cancer associated with BRCA1 or BRCA2 mutations detected in case series unselected for family history: a combined analysis of 222 studies. Am J Hum Carmen. 2003;72:1117-30.  Seb N, Gabbi M, Kimberly G.  BRCA1 and BRCA2 Hereditary Breast and Ovarian Cancer. Gene Reviews online. 2013.  Diogo YC, Julia S, Khurram G, Marcano S. Breast cancer risk among male BRCA1 and BRCA2 mutation carriers. J Natl Cancer Inst. 2007;99:1811-4.  Emir WHITE, Khris I, Devon J, Ta E, Samson ER, Leroy F. Risk of breast cancer in male BRCA2 carriers. J Med Carmen. 2010;47:710-1.  National Comprehensive Cancer Network. Clinical practice guidelines in oncology, colorectal cancer screening. Available online (registration required). 2015.  Mehdi CARPENTER, Cheryl J, Waldemar J, Hussein LA, Juany MS, Eng C. Lifetime cancer risks in individuals with germline PTEN mutations. Clin Cancer Res. 2012;18:400-7.  Kate SKY. Cowden Syndrome: A Critical Review of the Clinical Literature. J Carmen .  2009:18:13-27.  Kimberley A, Alex D, Mayco S, Annabelle P, Marshall T, Sheryl M, Isauro B, Ilana H, Nestor R, Will K, Roni L, Emir DG, Merayr D, Yaniv DF, Janeth MR, The Breast Cancer Susceptibility Collaboration () & Mariusz CARRANZA. YVONNE mutations that cause ataxia-telangiectasia are breast cancer susceptibility alleles. Nature Genetics. 2006;38:873-875  Cryil N , Uri Y, Manda J, Dann L, Joceline GM , Jeyson ML, Gallinger S, Joya AG, Syngal S, Allen ML, Opal J , Maria Isabel R, Shelly SZ, Mechelle JR, Melo VE, Den M, Vohailey B, Giselle N, Trinity RH, Aisha KW, and Adriana AP. YVONNE mutations in patients with hereditary pancreatic cancer. Cancer Discover. 2012;2:41-46  Heather MALCOLM., et al. Breast-Cancer Risk in Families with Mutations in PALB2. NEJM. 2014; 371(6):497-506.  CHEK2 Breast Cancer Case-Control Consortium. CHEK2*1100delC and susceptibility to breast cancer: A collaborative analysis involving 10,860 breast cancer cases and 9,065 controls from 10 studies. Am J Hum Carmen, 74 (2004), pp. 3205-6589  Nadia T, Enrico S, Sydnee K, et al. Spectrum of Mutations in BRCA1, BRCA2, CHEK2, and TP53 in Families at High Risk of Breast Cancer. DEJA. 2006;295(12):2363-0302.   Constanza C, Ce D, Daniel A, et al. Risk of breast cancer in women with a CHEK2 mutation with and without a family history of breast cancer. J Clin Oncol. 2011;29:2347-0299.  Luke H, Gracia E, Deon SJ, et al. Contribution of germline mutations in the RAD51B, RAD51C, and RAD51D genes to ovarian cancer in the population. J Clin Oncol. 2015;33(26):5654-6076. Doi:10.1200/JCO.2015.61.2408.  Flako Collinson DF, Yakov P, et al. Mutations in BRIP1 confer high risk of ovarian cancer. Karla Carmen. 2011;43(11):5601-6075. doi:10.1038/ng.955.

## 2022-06-22 ENCOUNTER — ANCILLARY PROCEDURE (OUTPATIENT)
Dept: MAMMOGRAPHY | Facility: CLINIC | Age: 67
End: 2022-06-22
Attending: FAMILY MEDICINE
Payer: COMMERCIAL

## 2022-06-22 ENCOUNTER — ANCILLARY PROCEDURE (OUTPATIENT)
Dept: BONE DENSITY | Facility: CLINIC | Age: 67
End: 2022-06-22
Attending: FAMILY MEDICINE
Payer: COMMERCIAL

## 2022-06-22 DIAGNOSIS — Z78.0 ENCOUNTER FOR OSTEOPOROSIS SCREENING IN ASYMPTOMATIC POSTMENOPAUSAL PATIENT: ICD-10-CM

## 2022-06-22 DIAGNOSIS — Z12.31 VISIT FOR SCREENING MAMMOGRAM: ICD-10-CM

## 2022-06-22 DIAGNOSIS — Z13.820 ENCOUNTER FOR OSTEOPOROSIS SCREENING IN ASYMPTOMATIC POSTMENOPAUSAL PATIENT: ICD-10-CM

## 2022-06-22 PROCEDURE — 77067 SCR MAMMO BI INCL CAD: CPT | Mod: TC | Performed by: RADIOLOGY

## 2022-06-22 PROCEDURE — 77080 DXA BONE DENSITY AXIAL: CPT | Performed by: INTERNAL MEDICINE

## 2022-07-14 ENCOUNTER — OFFICE VISIT (OUTPATIENT)
Dept: OTOLARYNGOLOGY | Facility: CLINIC | Age: 67
End: 2022-07-14
Attending: FAMILY MEDICINE
Payer: COMMERCIAL

## 2022-07-14 ENCOUNTER — OFFICE VISIT (OUTPATIENT)
Dept: AUDIOLOGY | Facility: CLINIC | Age: 67
End: 2022-07-14
Attending: FAMILY MEDICINE
Payer: COMMERCIAL

## 2022-07-14 VITALS
HEART RATE: 68 BPM | BODY MASS INDEX: 22.88 KG/M2 | WEIGHT: 124 LBS | DIASTOLIC BLOOD PRESSURE: 80 MMHG | SYSTOLIC BLOOD PRESSURE: 112 MMHG

## 2022-07-14 DIAGNOSIS — H90.3 SENSORINEURAL HEARING LOSS (SNHL), BILATERAL: Primary | ICD-10-CM

## 2022-07-14 DIAGNOSIS — H90.3 SENSORINEURAL HEARING LOSS, BILATERAL: Primary | ICD-10-CM

## 2022-07-14 DIAGNOSIS — H90.5 UNILATERAL SENSORINEURAL HEARING LOSS: ICD-10-CM

## 2022-07-14 PROCEDURE — 99203 OFFICE O/P NEW LOW 30 MIN: CPT | Performed by: OTOLARYNGOLOGY

## 2022-07-14 PROCEDURE — 92557 COMPREHENSIVE HEARING TEST: CPT

## 2022-07-14 PROCEDURE — 92550 TYMPANOMETRY & REFLEX THRESH: CPT

## 2022-07-14 NOTE — PROGRESS NOTES
AUDIOLOGY REPORT:    Patient was referred to [facility] from ENT by Dr. Good for a hearing examination. Patient is here today for a baseline audiogram due to her family members feeling her hearing has decreased. She does not feel she has a hearing loss. Gayla reports history of right-sided ear infection that lasted years, but has not returned for many years. She denies pain, pressure, drainage, family history of hearing loss, history of noise exposure, history of ear surgeries, dizziness and tinnitus.     Testing:    Otoscopy:   Otoscopic exam indicates ears are clear of cerumen bilaterally     Tympanograms:    RIGHT: restricted eardrum mobility      LEFT:   normal eardrum mobility    Reflexes (reported by stimulus ear): 1000 Hz  RIGHT: Ipsilateral is present at normal levels  RIGHT: Contralateral is present at normal levels  LEFT:   Ipsilateral is present at normal levels  LEFT:   Contralateral is absent at frequencies tested    Thresholds:   Pure Tone Thresholds assessed using conventional audiometry with good  reliability from 250-8000 Hz bilaterally using insert earphones and circumaural headphones     RIGHT:  normal sloping to severe sensorineural hearing loss    LEFT:    normal sloping to severe sensorineural hearing loss    Speech Reception Threshold:    RIGHT: 40 dB HL    LEFT:   40 dB HL  Speech Reception Thresholds are in good agreement with pure tone thresholds    Word Recognition Score:     RIGHT: 100% at 80 dB HL using NU-6 recorded word list.    LEFT:   100% at 80 dB HL using NU-6 recorded word list.    Discussed results with the patient. Patient is a hearing aid candidate- it is recommended she return for a hearing aid consultation.     Patient was returned to ENT for follow up.     Rishi Alejandro, CCC-A  Licensed Audiologist  [place] #[id number]    07/14/22

## 2022-07-14 NOTE — LETTER
7/14/2022         RE: Gayla Tavera  [address] [facility] [address]        Dear Colleague,    Thank you for referring your patient, Gayla Tavera, to the [facility]. Please see a copy of my visit note below.    ENT Consultation    Gayla Tavera is a 67 year old female who is seen in consultation at the request of self.      History of Present Illness - Gayla Tavera is a 67 year old female presents for evaluation of potential hearing loss.  Even though patient denies any issues with hearing her daughter and the rest of the family has noticed that she may not be hearing as well.  She denies difficulty with background noise.  She denies any tinnitus.  She gets occasional orthostatic symptoms of dizziness but no true vertigo.  She apparently few years back had ear infection on the right that was draining for a while but was treated successfully with drops and antibiotics.  No family history of hearing loss.      Past Medical History - History reviewed. No pertinent past medical history.    Current Medications -   Current Outpatient Medications:      buPROPion (WELLBUTRIN XL) 150 MG 24 hr tablet, Take 1 tablet (150 mg) by mouth every morning, Disp: 30 tablet, Rfl: 2    Allergies - No Known Allergies    Social History -   Social History     Socioeconomic History     Marital status:      Spouse name: None     Number of children: None     Years of education: None     Highest education level: None   Tobacco Use     Smoking status: Current Every Day Smoker     Packs/day: 1.00     Types: Other     Smokeless tobacco: Never Used     Tobacco comment: vapes, no cigarettes   Vaping Use     Vaping Use: Every day     Substances: Nicotine     Devices: Disposable   Substance and Sexual Activity     Alcohol use: Yes     Comment: social      Drug use: No     Sexual activity: Not Currently       Family History -   Family History   Problem Relation Age of Onset     Ovarian Cancer  Sister        Review of Systems - As per HPI and PMHx, otherwise review of system review of the head and neck negative. Otherwise 10+ review systems negative.    Physical Exam  /80   Pulse 68   Wt 56.2 kg (124 lb)   BMI 22.88 kg/m    BMI: Body mass index is 22.88 kg/m .    General - The patient is well nourished and well developed, and appears to have good nutritional status.  Alert and oriented to person and place, answers questions and cooperates with examination appropriately.    SKIN - No suspicious lesions or rashes.  Respiration - No respiratory distress.  Head and Face - Normocephalic and atraumatic, with no gross asymmetry noted of the contour of the facial features.  The facial nerve is intact, with strong symmetric movements.    Voice and Breathing - The patient was breathing comfortably without the use of accessory muscles. The patients voice was clear and strong, and had appropriate pitch and quality.    Ears - Bilateral pinna and EACs with normal appearing overlying skin. Tympanic membrane intact with good mobility on pneumatic otoscopy bilaterally. Bony landmarks of the ossicular chain are normal. The tympanic membranes are normal in appearance. No retraction, perforation, or masses.  No fluid or purulence was seen in the external canal or the middle ear.  The only finding is small amount of myringosclerosis noted on the right side right eardrum.    Eyes - Extraocular movements intact.  Sclera were not icteric or injected, conjunctiva were pink and moist.    Mouth - Examination of the oral cavity showed pink, healthy oral mucosa. No lesions or ulcerations noted.  The tongue was mobile and midline, and the dentition were in good condition.      Throat - The walls of the oropharynx were smooth, pink, moist, symmetric, and had no lesions or ulcerations.  The tonsillar pillars and soft palate were symmetric.  The uvula was midline on elevation.    Neck - Normal midline excursion of the  laryngotracheal complex during swallowing.  Full range of motion on passive movement.  Palpation of the occipital, submental, submandibular, internal jugular chain, and supraclavicular nodes did not demonstrate any abnormal lymph nodes or masses.  The carotid pulse was palpable bilaterally.  Palpation of the thyroid was soft and smooth, with no nodules or goiter appreciated.  The trachea was mobile and midline.    Nose - External contour is symmetric, no gross deflection or scars.  Nasal mucosa is pink and moist with no abnormal mucus.  The septum was midline and non-obstructive, turbinates of normal size and position.  No polyps, masses, or purulence noted on examination.    Neuro - Nonfocal neuro exam is normal, CN 2 through 12 intact, normal gait and muscle tone.      Performed in clinic today:  Audiologic Studies - An audiogram and tympanogram were performed today as part of the evaluation and personally reviewed. The tympanogram shows Type A curves on the right and Type A curves on the left, with Normal canal volumes and middle ear pressures.  The audiogram showed Mild to moderate sloping SNHL on the right and Mild to moderate sloping SNHLon the left.    Word recognition score 100% on the right and 100% on the left.    A/P - Gayla Tavera is a 67 year old female presents with bilateral hearing loss but subjectively she denies it.  We discussed with him the importance to address hearing loss early to prevent further loss.  At this point she is very reluctant to consider hearing aids.  She will protect her hearing and promises within the year to have it rechecked.      Cody Good MD      Again, thank you for allowing me to participate in the care of your patient.        Sincerely,        Cody Good MD, MD

## 2022-07-14 NOTE — PROGRESS NOTES
ENT Consultation    Gayla Tavera is a 67 year old female who is seen in consultation at the request of self.      History of Present Illness - Gayla Tavera is a 67 year old female presents for evaluation of potential hearing loss.  Even though patient denies any issues with hearing her daughter and the rest of the family has noticed that she may not be hearing as well.  She denies difficulty with background noise.  She denies any tinnitus.  She gets occasional orthostatic symptoms of dizziness but no true vertigo.  She apparently few years back had ear infection on the right that was draining for a while but was treated successfully with drops and antibiotics.  No family history of hearing loss.      Past Medical History - History reviewed. No pertinent past medical history.    Current Medications -   Current Outpatient Medications:      buPROPion (WELLBUTRIN XL) 150 MG 24 hr tablet, Take 1 tablet (150 mg) by mouth every morning, Disp: 30 tablet, Rfl: 2    Allergies - No Known Allergies    Social History -   Social History     Socioeconomic History     Marital status:      Spouse name: None     Number of children: None     Years of education: None     Highest education level: None   Tobacco Use     Smoking status: Current Every Day Smoker     Packs/day: 1.00     Types: Other     Smokeless tobacco: Never Used     Tobacco comment: vapes, no cigarettes   Vaping Use     Vaping Use: Every day     Substances: Nicotine     Devices: Disposable   Substance and Sexual Activity     Alcohol use: Yes     Comment: social      Drug use: No     Sexual activity: Not Currently       Family History -   Family History   Problem Relation Age of Onset     Ovarian Cancer Sister        Review of Systems - As per HPI and PMHx, otherwise review of system review of the head and neck negative. Otherwise 10+ review systems negative.    Physical Exam  /80   Pulse 68   Wt 56.2 kg (124 lb)   BMI 22.88 kg/m    BMI: Body  mass index is 22.88 kg/m .    General - The patient is well nourished and well developed, and appears to have good nutritional status.  Alert and oriented to person and place, answers questions and cooperates with examination appropriately.    SKIN - No suspicious lesions or rashes.  Respiration - No respiratory distress.  Head and Face - Normocephalic and atraumatic, with no gross asymmetry noted of the contour of the facial features.  The facial nerve is intact, with strong symmetric movements.    Voice and Breathing - The patient was breathing comfortably without the use of accessory muscles. The patients voice was clear and strong, and had appropriate pitch and quality.    Ears - Bilateral pinna and EACs with normal appearing overlying skin. Tympanic membrane intact with good mobility on pneumatic otoscopy bilaterally. Bony landmarks of the ossicular chain are normal. The tympanic membranes are normal in appearance. No retraction, perforation, or masses.  No fluid or purulence was seen in the external canal or the middle ear.  The only finding is small amount of myringosclerosis noted on the right side right eardrum.    Eyes - Extraocular movements intact.  Sclera were not icteric or injected, conjunctiva were pink and moist.    Mouth - Examination of the oral cavity showed pink, healthy oral mucosa. No lesions or ulcerations noted.  The tongue was mobile and midline, and the dentition were in good condition.      Throat - The walls of the oropharynx were smooth, pink, moist, symmetric, and had no lesions or ulcerations.  The tonsillar pillars and soft palate were symmetric.  The uvula was midline on elevation.    Neck - Normal midline excursion of the laryngotracheal complex during swallowing.  Full range of motion on passive movement.  Palpation of the occipital, submental, submandibular, internal jugular chain, and supraclavicular nodes did not demonstrate any abnormal lymph nodes or masses.  The carotid  pulse was palpable bilaterally.  Palpation of the thyroid was soft and smooth, with no nodules or goiter appreciated.  The trachea was mobile and midline.    Nose - External contour is symmetric, no gross deflection or scars.  Nasal mucosa is pink and moist with no abnormal mucus.  The septum was midline and non-obstructive, turbinates of normal size and position.  No polyps, masses, or purulence noted on examination.    Neuro - Nonfocal neuro exam is normal, CN 2 through 12 intact, normal gait and muscle tone.      Performed in clinic today:  Audiologic Studies - An audiogram and tympanogram were performed today as part of the evaluation and personally reviewed. The tympanogram shows Type A curves on the right and Type A curves on the left, with Normal canal volumes and middle ear pressures.  The audiogram showed Mild to moderate sloping SNHL on the right and Mild to moderate sloping SNHLon the left.    Word recognition score 100% on the right and 100% on the left.    A/P - Gayla Tavera is a 67 year old female presents with bilateral hearing loss but subjectively she denies it.  We discussed with him the importance to address hearing loss early to prevent further loss.  At this point she is very reluctant to consider hearing aids.  She will protect her hearing and promises within the year to have it rechecked.      Cody Good MD

## 2022-07-26 ENCOUNTER — VIRTUAL VISIT (OUTPATIENT)
Dept: ONCOLOGY | Facility: CLINIC | Age: 67
End: 2022-07-26
Attending: GENETIC COUNSELOR, MS
Payer: COMMERCIAL

## 2022-07-26 DIAGNOSIS — Z80.3 FAMILY HISTORY OF MALIGNANT NEOPLASM OF BREAST: ICD-10-CM

## 2022-07-26 DIAGNOSIS — Z80.0 FAMILY HISTORY OF COLON CANCER: ICD-10-CM

## 2022-07-26 DIAGNOSIS — Z80.41 FAMILY HISTORY OF MALIGNANT NEOPLASM OF OVARY: Primary | ICD-10-CM

## 2022-07-26 PROCEDURE — 999N000069 HC STATISTIC GENETIC COUNSELING, < 16 MIN: Mod: GT | Performed by: GENETIC COUNSELOR, MS

## 2022-07-26 NOTE — LETTER
"    7/26/2022         RE: Gayla Tavera  [address] [facility] [address]        Dear Colleague,    Thank you for referring your patient, Gayla Tavera, to the [facility]. Please see a copy of my visit note below.    7/26/2022    Referring Provider: Mari Nuñez DO    Presenting Information:  I spoke to Gayla by video today to discuss her genetic testing results. We last met on 6/7/2022 and her saliva sample was collected on 6/22/2022. The [facility] Breast [facility] G[facility] Cancers Guidelines-Based Panel + Colorectal Cancer Panel was ordered from [facility]. This testing was done because of Gayla's family history of ovarian, colon, and breast cancer.    Genetic Testing Result: NEGATIVE  Gayla is negative for mutations in the APC, YVONNE, AXIN2, BARD1, BMPR1A, BRCA1, BRCA2, BRIP1, CDH1, CHEK2, EPCAM, GREM1, MLH1, MSH2, MSH3, MSH6, MUTYH, NF1, NTHL1, PALB2, PMS2, POLD1, POLE, PTEN, RAD51C, RAD51D, SMAD4, STK11, and TP53 genes. No mutations were found in any of the 29 genes analyzed. This test involved sequencing and deletion/duplication analysis of all genes with the exceptions of EPCAM and GREM1 (deletions/duplications only).    Testing did not detect an identifiable mutation associated with Hereditary Breast and Ovarian Cancer syndrome (BRCA1, BRCA2), Gomez syndrome (MLH1, MSH2, MSH6, PMS2, EPCAM), Familial Adenomatous Polyposis (APC), Hereditary Diffuse Gastric Cancer (CDH1), Juvenile Polyposis syndrome (BMPR1A, SMAD4), Cowden syndrome (PTEN), Li Fraumeni syndrome (TP53), Peutz-Jeghers syndrome (STK11), MUTYH Associated Polyposis (MUTYH), or Neurofibromatosis type 1 (NF1).     A copy of the test report can be found in the Laboratory tab, dated 6/22/2022, and named \"LABORATORY MISCELLANEOUS ORDER\". The report is scanned in as a linked document.    Interpretation:  We discussed several different interpretations of this negative test result.    1. One explanation may be that there " is a different gene or combination of genes and environment that are associated with the cancers in this family that are not identifiable using this test. As such, Gayla is encouraged to contact me regularly to review any new genetic testing options that may be appropriate for her.  2. Another explanation may be that her relatives with cancer do have a mutation in one of these 29 genes and Gayla did not inherit it.  3. There is also a small possibility that there is a mutation in one of these genes, and the testing laboratory could not find it with their current testing methods.       Screening:  Based on this negative test result, it is important for Gayla and her relatives to refer back to the family history for appropriate cancer screening.      Due to Gayla's family history of ovarian cancer, Gayla and her other close female relatives remain at slightly increased risk for ovarian cancer. We discussed available ovarian cancer screening (pelvic exams, CA-125 blood tests, and transvaginal ultrasounds) as well as the significant limitations of this screening. As such, this screening is not typically recommended. That being said, women in this family should discuss this screening and the signs and symptoms of ovarian cancer with their primary OB/GYN provider, as they may have individualized recommendations.  Per current National Comprehensive Cancer Network (NCCN) guidelines, individuals with a first degree relative with colorectal cancer diagnosed at any age (like Gayla's brother) should begin colonoscopy at age 40, or 10 years before the earliest diagnosis of colorectal cancer, whichever is first. In this family, colonoscopy should start at age 40 and be repeated every 5 years, or based on colonoscopy findings. This would apply to Gayla and her other siblings. These recommendations may change based on personal and family history as well as personal preference, and should be discussed with an individual's physician.         Gayla s close female relatives remain at increased risk for breast cancer given their family history. Breast cancer screening is generally recommended to begin approximately 10 years younger than the earliest age of breast cancer diagnosis in the family, or at age 40, whichever comes first. In this family, screening could be considered as early as the 30's given her niece's cancer diagnosis in her 40's. Breast screening options should be discussed with an individual's primary care provider and a genetic counselor, to determine at what age to begin screening, what screening is appropriate, and if additional screening (such as breast MRI) is necessary based on personal/family history factors.    Other population cancer screening options, such as those recommended by the American Cancer Society and NCCN, are also appropriate for Gayla and her family. These screening recommendations may change if there are changes to Gayla's personal and/or family history of cancer. Final screening recommendations should be made by each individual's primary care provider.      Inheritance:  We reviewed the autosomal dominant inheritance of mutations in these 29 genes. We discussed that Gayla did not pass on an identifiable mutation in these genes to her children based on this test result. Mutations in these genes do not skip generations.      Additional Testing Considerations:  It is still possible Gayla does carry a currently identifiable gene mutation that increase her risk for certain cancers. As such, the option remains of pursuing a larger gene panel covering more genes associated with hereditary cancer. Gayla is encouraged to contact me if she wishes to readdress these larger gene panel options.     Also, although Gayla's genetic testing result was negative, other relatives may still carry a gene mutation associated with hereditary cancer. Genetic counseling is recommended for her niece with early-onset breast cancer to  discuss her genetic testing options. Gayla's other siblings and their children should also consider meeting with a genetic counselor. If any of these relatives do pursue genetic testing, Gayla is encouraged to contact me so that we may review the impact of their test results on her.    Summary:  We do not have an explanation for Gayla's family history of cancer. While no genetic changes were identified, Gayla may still be at risk for certain cancers due to family history, environmental factors, or other genetic causes not identified by this test. Because of that, it is important that she continue with cancer screening based on her personal and family history as discussed above.    Genetic testing is rapidly advancing, and new cancer susceptibility genes will most likely be identified in the future. Therefore, I encouraged Gayla to contact me regularly or if there are changes in her personal or family history. This may change how we assess her cancer risk, screening, and the testing we would offer.    Plan:  1. At her request, I provided Gayla with a copy of her test results via [facility] patient portal.  2. She plans to follow-up with her medical providers, as needed.  3. She should contact me periodically, and if her personal or family history of cancer changes.    If Gayla has any further questions, I encouraged her to contact me at [phone].      Carly Guidry MS, [facility] [place]  Licensed, Certified Genetic Counselor  Office: [phone]  Pager: [phone]

## 2022-07-26 NOTE — PROGRESS NOTES
7/26/2022    Gayla is a 67 year old who is being evaluated via a billable video visit.      How would you like to obtain your AVS? MyChart  If the video visit is dropped, the invitation should be resent by: Send to e-mail at: [email or web address]  Will anyone else be joining your video visit? Yes, daughter, Yudy will be joining.    Viviana GARCIA    Video-Visit Details  Video Start Time: 3:33pm  Type of service:  Video Visit  Video End Time:3:41pm  Originating Location (pt. Location): Home  Distant Location (provider location):  [facility] CANCER [facility]   Platform used for Video Visit: [facility]     Referring Provider: Mari Nuñez DO    Presenting Information:  I spoke to Gayla by video today to discuss her genetic testing results. We last met on 6/7/2022 and her saliva sample was collected on 6/22/2022. The [facility] Cancers Guidelines-Based Panel + Colorectal Cancer Panel was ordered from [facility]. This testing was done because of Gayla's family history of ovarian, colon, and breast cancer.    Genetic Testing Result: NEGATIVE  Gayla is negative for mutations in the APC, YVONNE, AXIN2, BARD1, BMPR1A, BRCA1, BRCA2, BRIP1, CDH1, CHEK2, EPCAM, GREM1, MLH1, MSH2, MSH3, MSH6, MUTYH, NF1, NTHL1, PALB2, PMS2, POLD1, POLE, PTEN, RAD51C, RAD51D, SMAD4, STK11, and TP53 genes. No mutations were found in any of the 29 genes analyzed. This test involved sequencing and deletion/duplication analysis of all genes with the exceptions of EPCAM and GREM1 (deletions/duplications only).    Testing did not detect an identifiable mutation associated with Hereditary Breast and Ovarian Cancer syndrome (BRCA1, BRCA2), Gomez syndrome (MLH1, MSH2, MSH6, PMS2, EPCAM), Familial Adenomatous Polyposis (APC), Hereditary Diffuse Gastric Cancer (CDH1), Juvenile Polyposis syndrome (BMPR1A, SMAD4), Cowden syndrome (PTEN), Li Fraumeni syndrome (TP53), Peutz-Jeghers syndrome (STK11), MUTYH  "Associated Polyposis (MUTYH), or Neurofibromatosis type 1 (NF1).     A copy of the test report can be found in the Laboratory tab, dated 6/22/2022, and named \"LABORATORY MISCELLANEOUS ORDER\". The report is scanned in as a linked document.    Interpretation:  We discussed several different interpretations of this negative test result.    1. One explanation may be that there is a different gene or combination of genes and environment that are associated with the cancers in this family that are not identifiable using this test. As such, Gayla is encouraged to contact me regularly to review any new genetic testing options that may be appropriate for her.  2. Another explanation may be that her relatives with cancer do have a mutation in one of these 29 genes and Gayla did not inherit it.  3. There is also a small possibility that there is a mutation in one of these genes, and the testing laboratory could not find it with their current testing methods.       Screening:  Based on this negative test result, it is important for Gayla and her relatives to refer back to the family history for appropriate cancer screening.      Due to Gayla's family history of ovarian cancer, Gayla and her other close female relatives remain at slightly increased risk for ovarian cancer. We discussed available ovarian cancer screening (pelvic exams, CA-125 blood tests, and transvaginal ultrasounds) as well as the significant limitations of this screening. As such, this screening is not typically recommended. That being said, women in this family should discuss this screening and the signs and symptoms of ovarian cancer with their primary OB/GYN provider, as they may have individualized recommendations.  Per current National Comprehensive Cancer Network (NCCN) guidelines, individuals with a first degree relative with colorectal cancer diagnosed at any age (like Gayla's brother) should begin colonoscopy at age 40, or 10 years before the earliest " diagnosis of colorectal cancer, whichever is first. In this family, colonoscopy should start at age 40 and be repeated every 5 years, or based on colonoscopy findings. This would apply to Gayla and her other siblings. These recommendations may change based on personal and family history as well as personal preference, and should be discussed with an individual's physician.        Gayla s close female relatives remain at increased risk for breast cancer given their family history. Breast cancer screening is generally recommended to begin approximately 10 years younger than the earliest age of breast cancer diagnosis in the family, or at age 40, whichever comes first. In this family, screening could be considered as early as the 30's given her niece's cancer diagnosis in her 40's. Breast screening options should be discussed with an individual's primary care provider and a genetic counselor, to determine at what age to begin screening, what screening is appropriate, and if additional screening (such as breast MRI) is necessary based on personal/family history factors.    Other population cancer screening options, such as those recommended by the American Cancer Society and NCCN, are also appropriate for Gayla and her family. These screening recommendations may change if there are changes to Gayla's personal and/or family history of cancer. Final screening recommendations should be made by each individual's primary care provider.      Inheritance:  We reviewed the autosomal dominant inheritance of mutations in these 29 genes. We discussed that Gayla did not pass on an identifiable mutation in these genes to her children based on this test result. Mutations in these genes do not skip generations.      Additional Testing Considerations:  It is still possible Gayla does carry a currently identifiable gene mutation that increase her risk for certain cancers. As such, the option remains of pursuing a larger gene panel  covering more genes associated with hereditary cancer. Gayla is encouraged to contact me if she wishes to readdress these larger gene panel options.     Also, although Gayla's genetic testing result was negative, other relatives may still carry a gene mutation associated with hereditary cancer. Genetic counseling is recommended for her niece with early-onset breast cancer to discuss her genetic testing options. Gayla's other siblings and their children should also consider meeting with a genetic counselor. If any of these relatives do pursue genetic testing, Gayla is encouraged to contact me so that we may review the impact of their test results on her.    Summary:  We do not have an explanation for Gayla's family history of cancer. While no genetic changes were identified, Gayla may still be at risk for certain cancers due to family history, environmental factors, or other genetic causes not identified by this test. Because of that, it is important that she continue with cancer screening based on her personal and family history as discussed above.    Genetic testing is rapidly advancing, and new cancer susceptibility genes will most likely be identified in the future. Therefore, I encouraged Gayla to contact me regularly or if there are changes in her personal or family history. This may change how we assess her cancer risk, screening, and the testing we would offer.    Plan:  1. At her request, I provided Gayla with a copy of her test results via [facility]'s patient portal.  2. She plans to follow-up with her medical providers, as needed.  3. She should contact me periodically, and if her personal or family history of cancer changes.    If Gayla has any further questions, I encouraged her to contact me at [phone].    Carly Guidry MS, [facility] [place]  Licensed, Certified Genetic Counselor  Office: [phone]  Pager: [phone]

## 2022-07-26 NOTE — LETTER
Cancer Risk Management  Program Locations    [facility]  [facility]  [facility] Cancer [facility] Cancer [facility]  Mailing Address  Cancer Risk Management Program  [facility]  [address]  [place] [address]    New patient appointments  [phone]  July 28, 2022    Gayla Tavera  [address]  [place] [address]      Dear Gayla,    It was a pleasure speaking with you recently regarding your genetic testing results. Here is a copy of the progress note summarizing our conversation. If you have any additional questions, please feel free to call.    7/26/2022    Gayla is a 67 year old who is being evaluated via a billable video visit.      How would you like to obtain your AVS? MyChart  If the video visit is dropped, the invitation should be resent by: Send to e-mail at: [email or web address]  Will anyone else be joining your video visit? Yes, daughter, Lexie will be joining.    Viviana GARCIA    Video-Visit Details  Video Start Time: 3:33pm  Type of service:  Video Visit  Video End Time:3:41pm  Originating Location (pt. Location): Home  Distant Location (provider location):  [facility] CANCER [facility]   Platform used for Video Visit: [facility]     Referring Provider: Mari Nuñez DO    Presenting Information:  I spoke to Gayla by video today to discuss her genetic testing results. We last met on 6/7/2022 and her saliva sample was collected on 6/22/2022. The [facility] Breast [facility] Cancers Guidelines-Based Panel + Colorectal Cancer Panel was ordered from [facility]. This testing was done because of Gayla's family history of ovarian, colon, and breast cancer.    Genetic Testing Result: NEGATIVE  Gayla is negative for mutations in the APC, YVONNE, AXIN2, BARD1, BMPR1A, BRCA1, BRCA2, BRIP1, CDH1, CHEK2, EPCAM, GREM1, MLH1,  "MSH2, MSH3, MSH6, MUTYH, NF1, NTHL1, PALB2, PMS2, POLD1, POLE, PTEN, RAD51C, RAD51D, SMAD4, STK11, and TP53 genes. No mutations were found in any of the 29 genes analyzed. This test involved sequencing and deletion/duplication analysis of all genes with the exceptions of EPCAM and GREM1 (deletions/duplications only).    Testing did not detect an identifiable mutation associated with Hereditary Breast and Ovarian Cancer syndrome (BRCA1, BRCA2), Gomez syndrome (MLH1, MSH2, MSH6, PMS2, EPCAM), Familial Adenomatous Polyposis (APC), Hereditary Diffuse Gastric Cancer (CDH1), Juvenile Polyposis syndrome (BMPR1A, SMAD4), Cowden syndrome (PTEN), Li Fraumeni syndrome (TP53), Peutz-Jeghers syndrome (STK11), MUTYH Associated Polyposis (MUTYH), or Neurofibromatosis type 1 (NF1).     A copy of the test report can be found in the Laboratory tab, dated 6/22/2022, and named \"LABORATORY MISCELLANEOUS ORDER\". The report is scanned in as a linked document.    Interpretation:  We discussed several different interpretations of this negative test result.    1. One explanation may be that there is a different gene or combination of genes and environment that are associated with the cancers in this family that are not identifiable using this test. As such, Gayla is encouraged to contact me regularly to review any new genetic testing options that may be appropriate for her.  2. Another explanation may be that her relatives with cancer do have a mutation in one of these 29 genes and Gayla did not inherit it.  3. There is also a small possibility that there is a mutation in one of these genes, and the testing laboratory could not find it with their current testing methods.       Screening:  Based on this negative test result, it is important for Gayla and her relatives to refer back to the family history for appropriate cancer screening.      Due to Gayla's family history of ovarian cancer, Gayla and her other close female relatives remain at " slightly increased risk for ovarian cancer. We discussed available ovarian cancer screening (pelvic exams, CA-125 blood tests, and transvaginal ultrasounds) as well as the significant limitations of this screening. As such, this screening is not typically recommended. That being said, women in this family should discuss this screening and the signs and symptoms of ovarian cancer with their primary OB/GYN provider, as they may have individualized recommendations.  Per current National Comprehensive Cancer Network (NCCN) guidelines, individuals with a first degree relative with colorectal cancer diagnosed at any age (like Gayla's brother) should begin colonoscopy at age 40, or 10 years before the earliest diagnosis of colorectal cancer, whichever is first. In this family, colonoscopy should start at age 40 and be repeated every 5 years, or based on colonoscopy findings. This would apply to Gayla and her other siblings. These recommendations may change based on personal and family history as well as personal preference, and should be discussed with an individual's physician.        Gayla s close female relatives remain at increased risk for breast cancer given their family history. Breast cancer screening is generally recommended to begin approximately 10 years younger than the earliest age of breast cancer diagnosis in the family, or at age 40, whichever comes first. In this family, screening could be considered as early as the 30's given her niece's cancer diagnosis in her 40's. Breast screening options should be discussed with an individual's primary care provider and a genetic counselor, to determine at what age to begin screening, what screening is appropriate, and if additional screening (such as breast MRI) is necessary based on personal/family history factors.    Other population cancer screening options, such as those recommended by the American Cancer Society and NCCN, are also appropriate for Gayla and her  family. These screening recommendations may change if there are changes to Gayla's personal and/or family history of cancer. Final screening recommendations should be made by each individual's primary care provider.      Inheritance:  We reviewed the autosomal dominant inheritance of mutations in these 29 genes. We discussed that Gayla did not pass on an identifiable mutation in these genes to her children based on this test result. Mutations in these genes do not skip generations.      Additional Testing Considerations:  It is still possible Gayla does carry a currently identifiable gene mutation that increase her risk for certain cancers. As such, the option remains of pursuing a larger gene panel covering more genes associated with hereditary cancer. Gayla is encouraged to contact me if she wishes to readdress these larger gene panel options.     Also, although Gayla's genetic testing result was negative, other relatives may still carry a gene mutation associated with hereditary cancer. Genetic counseling is recommended for her niece with early-onset breast cancer to discuss her genetic testing options. Gayla's other siblings and their children should also consider meeting with a genetic counselor. If any of these relatives do pursue genetic testing, Gayla is encouraged to contact me so that we may review the impact of their test results on her.    Summary:  We do not have an explanation for Gayla's family history of cancer. While no genetic changes were identified, Gayla may still be at risk for certain cancers due to family history, environmental factors, or other genetic causes not identified by this test. Because of that, it is important that she continue with cancer screening based on her personal and family history as discussed above.    Genetic testing is rapidly advancing, and new cancer susceptibility genes will most likely be identified in the future. Therefore, I encouraged Gayla to contact me  regularly or if there are changes in her personal or family history. This may change how we assess her cancer risk, screening, and the testing we would offer.    Plan:  1. At her request, I provided Gayla with a copy of her test results via [facility]'s patient portal.  2. She plans to follow-up with her medical providers, as needed.  3. She should contact me periodically, and if her personal or family history of cancer changes.    If Gayla has any further questions, I encouraged her to contact me at [phone].    Carly Guidry MS, [facility] [place]  Licensed, Certified Genetic Counselor  Office: [phone]  Pager: [phone]

## 2022-09-08 ENCOUNTER — VIRTUAL VISIT (OUTPATIENT)
Dept: FAMILY MEDICINE | Facility: CLINIC | Age: 67
End: 2022-09-08
Payer: COMMERCIAL

## 2022-09-08 DIAGNOSIS — G47.00 INSOMNIA, UNSPECIFIED TYPE: ICD-10-CM

## 2022-09-08 DIAGNOSIS — Z12.11 SCREEN FOR COLON CANCER: Primary | ICD-10-CM

## 2022-09-08 PROCEDURE — 99443 PR PHYSICIAN TELEPHONE EVALUATION 21-30 MIN: CPT | Mod: 95 | Performed by: FAMILY MEDICINE

## 2022-09-08 RX ORDER — TRAZODONE HYDROCHLORIDE 50 MG/1
25-50 TABLET, FILM COATED ORAL AT BEDTIME
Qty: 30 TABLET | Refills: 1 | Status: SHIPPED | OUTPATIENT
Start: 2022-09-08 | End: 2023-10-03

## 2022-09-08 ASSESSMENT — ANXIETY QUESTIONNAIRES
IF YOU CHECKED OFF ANY PROBLEMS ON THIS QUESTIONNAIRE, HOW DIFFICULT HAVE THESE PROBLEMS MADE IT FOR YOU TO DO YOUR WORK, TAKE CARE OF THINGS AT HOME, OR GET ALONG WITH OTHER PEOPLE: NOT DIFFICULT AT ALL
2. NOT BEING ABLE TO STOP OR CONTROL WORRYING: NOT AT ALL
7. FEELING AFRAID AS IF SOMETHING AWFUL MIGHT HAPPEN: NOT AT ALL
3. WORRYING TOO MUCH ABOUT DIFFERENT THINGS: NOT AT ALL
GAD7 TOTAL SCORE: 1
1. FEELING NERVOUS, ANXIOUS, OR ON EDGE: SEVERAL DAYS
6. BECOMING EASILY ANNOYED OR IRRITABLE: NOT AT ALL
GAD7 TOTAL SCORE: 1
5. BEING SO RESTLESS THAT IT IS HARD TO SIT STILL: NOT AT ALL

## 2022-09-08 ASSESSMENT — PATIENT HEALTH QUESTIONNAIRE - PHQ9
SUM OF ALL RESPONSES TO PHQ QUESTIONS 1-9: 6
5. POOR APPETITE OR OVEREATING: NOT AT ALL

## 2022-09-08 NOTE — PROGRESS NOTES
Gayla is a 67 year old who is being evaluated via a billable telephone visit.      What phone number would you like to be contacted at? [phone]  How would you like to obtain your AVS? Mail a copy    Assessment & Plan     Screen for colon cancer  The patient reports she did a colonoscopy a couple of years ago.  She is not sure of the results or when she needs to repeat this.  She states she has a copy of it at home and that she will take a picture of it and scanned it into her MyChart.    Insomnia, unspecified type  Will trial trazodone for sleep  - traZODone (DESYREL) 50 MG tablet; Take 0.5-1 tablets (25-50 mg) by mouth At Bedtime    22 minutes spent on the date of the encounter doing chart review, history and exam, documentation and further activities per the note        No follow-ups on file.    Mari Nuñez DO  [facility]    Coleen Shukla is a 67 year old, presenting for the following health issues:  Insomnia      HPI       Insomnia  Onset/Duration: Over 2 years  Description:   Frequency of insomnia:  several times a week  Time to fall asleep (sleep latency): 1 hour  Middle of night awakening:  No  Early morning awakening:  YES  Progression of Symptoms:  intermittent  Accompanying Signs & Symptoms:  Daytime sleepiness/napping: YES- daytime sleepiness  Excessive snoring/apnea: No  Restless legs: No  Waking to urinate: No  Chronic pain:  No  Depression symptoms (if yes, do PHQ9): No  Anxiety symptoms (if yes, do MARÍA-7): No  History:  Prior Insomnia: YES  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 Improved     Are you having other symptoms that might be associated with depression? No    Have you had a significant life event?  No     Are you feeling anxious or having panic attacks?   No    Do you have any concerns with your use of alcohol or other drugs? No    Social History     Tobacco Use     Smoking status: Current Every Day Smoker     Packs/day: 1.00     Types: Other     Smokeless tobacco: Never Used     Tobacco comment: vapes, no cigarettes   Vaping Use     Vaping Use: Every day     Substances: Nicotine     Devices: Disposable   Substance Use Topics     Alcohol use: Yes     Comment: social      Drug use: No     PHQ 2/12/2020 5/27/2022 9/8/2022   PHQ-9 Total Score 6 11 6   Q9: Thoughts of better off dead/self-harm past 2 weeks Not at all Several days Not at all   F/U: Thoughts of suicide or self-harm - No -   F/U: Safety concerns - No -     MARÍA-7 SCORE 9/8/2022   Total Score 1     Last PHQ-9 9/8/2022   1.  Little interest or pleasure in doing things 1   2.  Feeling down, depressed, or hopeless 1   3.  Trouble falling or staying asleep, or sleeping too much 2   4.  Feeling tired or having little energy 0   5.  Poor appetite or overeating 0   6.  Feeling bad about yourself 1   7.  Trouble concentrating 1   8.  Moving slowly or restless 0   Q9: Thoughts of better off dead/self-harm past 2 weeks 0   PHQ-9 Total Score 6   Difficulty at work, home, or with people Not difficult at all   In the past two weeks have you had thoughts of suicide or self harm? -   Do you have concerns about your personal safety or the safety of others? -     MARÍA-7  9/8/2022   1. Feeling nervous, anxious, or on edge 1   2. Not being able to stop or control worrying 0   3. Worrying too much about different things 0   4. Trouble relaxing 0   5. Being so restless that it is hard to sit still 0    6. Becoming easily annoyed or irritable 0   7. Feeling afraid, as if something awful might happen 0   MARÍA-7 Total Score 1   If you checked any problems, how difficult have they made it for you to do your work, take care of things at home, or get along with other people? Not difficult at all       Suicide Assessment Five-step Evaluation and Treatment (SAFE-T)            Review of Systems   Constitutional, HEENT, cardiovascular, pulmonary, gi and gu systems are negative, except as otherwise noted.      Objective           Vitals:  No vitals were obtained today due to virtual visit.    Physical Exam   healthy, alert and no distress  PSYCH: Alert and oriented times 3; coherent speech, normal   rate and volume, able to articulate logical thoughts, able   to abstract reason, no tangential thoughts, no hallucinations   or delusions  Her affect is normal  RESP: No cough, no audible wheezing, able to talk in full sentences  Remainder of exam unable to be completed due to telephone visits            Phone call duration: 22 minutes

## 2023-04-18 ENCOUNTER — TRANSFERRED RECORDS (OUTPATIENT)
Dept: HEALTH INFORMATION MANAGEMENT | Facility: CLINIC | Age: 68
End: 2023-04-18

## 2023-05-16 ENCOUNTER — ANCILLARY PROCEDURE (OUTPATIENT)
Dept: RADIOLOGY | Facility: CLINIC | Age: 68
End: 2023-05-16
Payer: COMMERCIAL

## 2023-05-16 ENCOUNTER — TRANSFERRED RECORDS (OUTPATIENT)
Dept: HEALTH INFORMATION MANAGEMENT | Facility: CLINIC | Age: 68
End: 2023-05-16
Payer: COMMERCIAL

## 2023-05-18 ENCOUNTER — TRANSFERRED RECORDS (OUTPATIENT)
Dept: HEALTH INFORMATION MANAGEMENT | Facility: CLINIC | Age: 68
End: 2023-05-18
Payer: COMMERCIAL

## 2023-05-25 ENCOUNTER — OFFICE VISIT (OUTPATIENT)
Dept: PULMONOLOGY | Facility: CLINIC | Age: 68
End: 2023-05-25
Payer: COMMERCIAL

## 2023-05-25 VITALS
DIASTOLIC BLOOD PRESSURE: 68 MMHG | BODY MASS INDEX: 23.19 KG/M2 | SYSTOLIC BLOOD PRESSURE: 118 MMHG | WEIGHT: 126 LBS | HEART RATE: 78 BPM | OXYGEN SATURATION: 98 % | HEIGHT: 62 IN

## 2023-05-25 DIAGNOSIS — J43.9 PULMONARY EMPHYSEMA, UNSPECIFIED EMPHYSEMA TYPE (H): Primary | ICD-10-CM

## 2023-05-25 DIAGNOSIS — R91.8 PULMONARY NODULES: ICD-10-CM

## 2023-05-25 PROCEDURE — 99204 OFFICE O/P NEW MOD 45 MIN: CPT | Performed by: INTERNAL MEDICINE

## 2023-05-25 RX ORDER — FENOFIBRATE 54 MG/1
54 TABLET ORAL DAILY
COMMUNITY
End: 2023-10-03

## 2023-05-25 NOTE — PROGRESS NOTES
CCx:Establishment of care for pulmonary nodules    HPI:Ms. Tavera is a 68 year old with a past medical history significant for tobacco abuse, questionable TIA presents to clinic for aforementioned chief complaint.  She follows with her primary care clinic and underwent a low-dose screening CT scan for lung cancer given her history of tobacco use.  She has no shortness of breath, dyspnea on exertion or chest pain but her daughter who is here with her states that her mother had complained of some chest tightness in the winter.  She presently has no wheezing, coughing, nighttime awakenings with pulmonary symptoms, fevers, chills, night sweats, change in weight, abdominal pain, urinary issues or hemoptysis.    ROS:  Pertinent positives alluded to in the HPI. Remainder of 10 point ROS is negative.      PMH:  1. Tobacco use  2. Questionable TIA    Allergies:  Reviewed in epic.    Family HX:  1. Sister: Ovarian cancer at the age of 77 years,   2. Brother: Lung cancer, age 81, tobacco abuse  3. Brother #2: Colon cancer in his 80s    Social Hx:  Marital status: Single  Number of children: 2  Resides in a [employer] built in , no concern for mold.  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   service: No  Pets: No  Smoking history: 40+ pack year history; quit smoking in , vapes now.  Alcohol use: Socially.  Recreational drug use: No  Hobbies: Walking  Recent Travel: No    Current Meds:  Current Outpatient Medications   Medication Sig Dispense Refill     fenofibrate (LOFIBRA) 54 MG tablet Take 54 mg by mouth daily       buPROPion (WELLBUTRIN XL) 150 MG 24 hr tablet Take 1 tablet (150 mg) by mouth every morning 30 tablet 2     traZODone (DESYREL) 50 MG tablet Take 0.5-1 tablets (25-50 mg) by mouth At Bedtime 30 tablet 1     Labs:  Reviewed in epic.    Imaging studies:  LDCT Chest 23 (report from [facility], images personally  "reviewed):  Moderate centrilobular emphysema with upper lobe predominance. Multifocal endobronchial filling within the peripheral bronchioles in the right lower lobe. No pleural effusion or pneumothorax.  Several parenchymal abnormalities BL:  RLL, well circumscribed oval nodule 0.8*0.8*1.1cm  Left apex, cystic change/irregular bulla with irregular peripheral soft tissue thickening 1.4*1.1*1.7cm.  RUL, irregularly marginated solid nodule 0.6*0.7*0.8cm.    Visible thyroid gland and lower neck is unremarkable. Heart is normal in size. No significant coronary artery calcification. Round soft tissue structure interposed between the mid esophagus in the distal trachea: 1.3*1.3*1.4cm.    Physical Exam:  /68 (BP Location: Left arm, Patient Position: Chair, Cuff Size: Adult Regular)   Pulse 78   Ht 1.575 m (5' 2\")   Wt 57.2 kg (126 lb)   SpO2 98%   BMI 23.05 kg/m    GEN: NAD  HEENT: PERRL, No JVD  LUNGS: CTA BL  CVS: S1 & S2 no added sounds  ABD: No HSM, BS audible  EXT: No edema, no rashes  NEURO: AO*3 CN2-12 intact    Assessment and Plan:Gayla Tavera is a 68 year old with a past medical history significant for tobacco abuse and questionable TIA who presents to clinic today for establishment of care for multiple pulmonary nodules and emphysema.  For the present we would recommend;    1. Pulmonary nodules: Noted in the right upper lower lobe, right upper lobe and in the left apex.  Right lower lobe nodule appears to be very well-circumscribed but this and the other could certainly be malignant especially given her history of tobacco use.  I explained this to her and her daughter at length.    Obtain a PET scan to evaluate metabolic activity of pulmonary nodules.  I have explained to her that her plan will depend on the results of the PET scan.  2. Emphysema: Likely related to significant tobacco abuse.  She had some minimal symptoms last winter but is otherwise very active and does not have any shortness of " breath or wheezing.    Obtain baseline pulmonary function testing.  3. HCM: We will address vaccinations at the next clinic visit.  The patient was very anxious about her CT scan and follow-up plans.  4. Follow-up: Depending on the results of her PET scan.      Sayra Torresqvi  Pulmonary and Critical Care  1202

## 2023-06-05 ENCOUNTER — HOSPITAL ENCOUNTER (OUTPATIENT)
Dept: PET IMAGING | Facility: HOSPITAL | Age: 68
Discharge: HOME | End: 2023-06-05
Attending: INTERNAL MEDICINE | Admitting: INTERNAL MEDICINE
Payer: COMMERCIAL

## 2023-06-05 DIAGNOSIS — R91.8 PULMONARY NODULES: ICD-10-CM

## 2023-06-05 LAB — GLUCOSE BLDC GLUCOMTR-MCNC: 92 MG/DL (ref 70–99)

## 2023-06-05 PROCEDURE — A9552 F18 FDG: HCPCS | Performed by: INTERNAL MEDICINE

## 2023-06-05 PROCEDURE — 82962 GLUCOSE BLOOD TEST: CPT

## 2023-06-05 PROCEDURE — 78815 PET IMAGE W/CT SKULL-THIGH: CPT | Mod: PI

## 2023-06-05 PROCEDURE — 343N000001 HC RX 343: Performed by: INTERNAL MEDICINE

## 2023-06-05 RX ADMIN — FLUDEOXYGLUCOSE F-18 10.2 MILLICURIE: 500 INJECTION, SOLUTION INTRAVENOUS at 13:25

## 2023-06-10 DIAGNOSIS — J43.9 PULMONARY EMPHYSEMA, UNSPECIFIED EMPHYSEMA TYPE (H): ICD-10-CM

## 2023-06-10 DIAGNOSIS — R94.2 ABNORMAL POSITRON EMISSION TOMOGRAPHY (PET) OF LUNG: ICD-10-CM

## 2023-06-10 DIAGNOSIS — R91.8 PULMONARY NODULES: Primary | ICD-10-CM

## 2023-06-10 NOTE — CONFIDENTIAL NOTE
Talked to Ms. Tavera about plan to biopsy both RUL and RLL PET positive pulmonary nodules and place fiducial markers. She is in agreement with this plan, she is also aware that I have spoken with Dr. Lynne from radiation oncology and will be scheduled to see her soon thereafter.    I have requested that she call our clinic by Wednesday next week if she has not heard from radiology scheduling.    Sayra Dumont MD  Pulmonary and Critical Care  P) [phone]

## 2023-06-20 ENCOUNTER — TRANSFERRED RECORDS (OUTPATIENT)
Dept: HEALTH INFORMATION MANAGEMENT | Facility: CLINIC | Age: 68
End: 2023-06-20
Payer: COMMERCIAL

## 2023-06-21 ENCOUNTER — PATIENT OUTREACH (OUTPATIENT)
Dept: ONCOLOGY | Facility: CLINIC | Age: 68
End: 2023-06-21
Payer: COMMERCIAL

## 2023-06-21 DIAGNOSIS — R94.2 ABNORMAL POSITRON EMISSION TOMOGRAPHY (PET) OF LUNG: Primary | ICD-10-CM

## 2023-06-21 DIAGNOSIS — R91.8 PULMONARY NODULES: ICD-10-CM

## 2023-06-21 NOTE — PROGRESS NOTES
New Patient Oncology Nurse Navigator Note     Referring provider: Dr. Sayra Dumont    Referring Clinic/Organization: [facility]  Referred to: Radiation Oncology  Requested provider (if applicable): Dr. Maged Lobo  Referral Received: 06/21/23       Evaluation for :   Diagnosis   R94.2 (ICD-10-CM) - Abnormal positron emission tomography (PET) of lung   R91.8 (ICD-10-CM) - Pulmonary nodules     Clinical History (per Nurse review of records provided):      05/16/2023 CT Chest (scanned media bookmarked)    06/05/2023 PET (bookmarked) showed:   PET/CT FINDINGS: Mildly FDG avid spiculated nodule in the right upper lobe measuring 0.7 x 0.5 cm (max SUV 2.7), spiculated nodule in the right lower lobe measuring 0.7 x 0.7 cm (max SUV 2.9), and mildly FDG avid nodularity with associated cystic change   in the left lung apex measuring 1.3 x 0.8 cm (max SUV 1.5) suspicious for early synchronous primary lung neoplasms of low metabolic activity such as adenocarcinomas.     Focal FDG uptake involving the right anterior fourth rib with associated fracture deformity (max SUV 3.9) likely representing benign post traumatic inflammatory change. Diffuse esophagitis. Shoulder synovitis.     CT FINDINGS: Mild senescent intracranial changes. Postoperative change of the right lens. Mild to moderate emphysema. Breast implants with in situ linear densities suggesting sequelae of prior intracapsular rupture. No significant coronary artery   calcium. Sigmoid diverticulosis. Pelvic phleboliths. Multilevel degenerative changes of the spine.                                                                      IMPRESSION:     Findings suspicious for early lung adenocarcinomas in the right upper lobe, right lower lobe, and left lung apex without metastasis.    Clinical Assessment / Barriers to Care (Per Nurse):    Tobacco History    Smoking Status   Former Quit Date   2021 Smoking Frequency   1.00 packs/day Smoking Tobacco Type    Cigarettes quit in 2021, Other   Smokeless Tobacco Use   Never   Tobacco Comments   vapes, no cigarettes     Records Location: [facility]     Records Needed: None  Additional testing needed prior to consult: biopsy (scheduled 6/26)    CRISTO MorrisN, RN, OCN  [facility] Oncology Nurse Navigator  [phone] / [phone]

## 2023-06-21 NOTE — CONFIDENTIAL NOTE
I have placed a referral to radiation oncology (Dr. Lynne). Patient scheduled for fiducial placement and aware of XRT referral.    Sayra Dumont MD  Pulmonary and Critical Care  (P) [phone]

## 2023-06-21 NOTE — PROGRESS NOTES
Talked to Ms. Tavera about plan to biopsy both RUL and RLL PET positive pulmonary nodules and place fiducial markers. She is in agreement with this plan, she is also aware that I have spoken with Dr. Lynne from radiation oncology and will be scheduled to see her soon thereafter.     I have requested that she call our clinic by Wednesday next week if she has not heard from radiology scheduling.     Sayra Dumont MD  Pulmonary and Critical Care  P) [phone]

## 2023-06-22 ENCOUNTER — TRANSFERRED RECORDS (OUTPATIENT)
Dept: HEALTH INFORMATION MANAGEMENT | Facility: CLINIC | Age: 68
End: 2023-06-22

## 2023-06-22 LAB
ALT SERPL-CCNC: 39 U/L (ref 6–29)
AST SERPL-CCNC: 30 U/L (ref 10–35)
CREATININE (EXTERNAL): 0.83 MG/DL (ref 0.6–1)
GFR ESTIMATED (EXTERNAL): 76 ML/MIN/1.73M2
GLUCOSE (EXTERNAL): 121 MG/DL (ref 65–99)
INR (EXTERNAL): 1.2
POTASSIUM (EXTERNAL): 4.2 MMOL/L (ref 3.5–5.3)

## 2023-06-22 NOTE — TELEPHONE ENCOUNTER
RECORDS STATUS - ALL OTHER DIAGNOSIS      RECORDS RECEIVED FROM: [facility]   DATE RECEIVED: 6/22   NOTES STATUS DETAILS   OFFICE NOTE from referring provider Epic Sayra Dumont MD in [facility]   MEDICATION LIST [facility]    LABS     ANYTHING RELATED TO DIAGNOSIS Epic 4/18/23   IMAGING (NEED IMAGES & REPORT)     CT SCANS PACS 5/16/23: [facility]   PET PACS 6/5/23: Epic

## 2023-06-23 ENCOUNTER — TRANSFERRED RECORDS (OUTPATIENT)
Dept: HEALTH INFORMATION MANAGEMENT | Facility: CLINIC | Age: 68
End: 2023-06-23
Payer: COMMERCIAL

## 2023-06-23 ENCOUNTER — TELEPHONE (OUTPATIENT)
Dept: INTERVENTIONAL RADIOLOGY/VASCULAR | Facility: CLINIC | Age: 68
End: 2023-06-23
Payer: COMMERCIAL

## 2023-06-26 ENCOUNTER — HOSPITAL ENCOUNTER (OUTPATIENT)
Dept: CT IMAGING | Facility: HOSPITAL | Age: 68
Discharge: HOME | End: 2023-06-26
Attending: RADIOLOGY
Payer: COMMERCIAL

## 2023-06-26 ENCOUNTER — HOSPITAL ENCOUNTER (OUTPATIENT)
Dept: RADIOLOGY | Facility: HOSPITAL | Age: 68
Discharge: HOME | End: 2023-06-26
Attending: RADIOLOGY
Payer: COMMERCIAL

## 2023-06-26 ENCOUNTER — HOSPITAL ENCOUNTER (OUTPATIENT)
Dept: CT IMAGING | Facility: HOSPITAL | Age: 68
Discharge: HOME | End: 2023-06-26
Attending: INTERNAL MEDICINE
Payer: COMMERCIAL

## 2023-06-26 VITALS
RESPIRATION RATE: 16 BRPM | TEMPERATURE: 98.2 F | SYSTOLIC BLOOD PRESSURE: 120 MMHG | OXYGEN SATURATION: 99 % | DIASTOLIC BLOOD PRESSURE: 69 MMHG | HEART RATE: 59 BPM

## 2023-06-26 DIAGNOSIS — R91.1 PULMONARY NODULE: ICD-10-CM

## 2023-06-26 DIAGNOSIS — R94.2 ABNORMAL POSITRON EMISSION TOMOGRAPHY (PET) OF LUNG: Primary | ICD-10-CM

## 2023-06-26 DIAGNOSIS — J43.9 PULMONARY EMPHYSEMA, UNSPECIFIED EMPHYSEMA TYPE (H): ICD-10-CM

## 2023-06-26 DIAGNOSIS — R91.8 PULMONARY NODULES: ICD-10-CM

## 2023-06-26 PROBLEM — C34.31 MALIGNANT NEOPLASM OF LOWER LOBE OF RIGHT LUNG (H): Status: ACTIVE | Noted: 2023-06-26

## 2023-06-26 PROBLEM — C34.11 MALIGNANT NEOPLASM OF UPPER LOBE OF RIGHT LUNG (H): Status: ACTIVE | Noted: 2023-06-26

## 2023-06-26 LAB
HGB BLD-MCNC: 10.2 G/DL (ref 11.7–15.7)
INR PPP: 1.13 (ref 0.85–1.15)
PLATELET # BLD AUTO: 289 10E3/UL (ref 150–450)

## 2023-06-26 PROCEDURE — 85610 PROTHROMBIN TIME: CPT | Performed by: RADIOLOGY

## 2023-06-26 PROCEDURE — 36415 COLL VENOUS BLD VENIPUNCTURE: CPT | Performed by: RADIOLOGY

## 2023-06-26 PROCEDURE — 88305 TISSUE EXAM BY PATHOLOGIST: CPT | Mod: TC | Performed by: INTERNAL MEDICINE

## 2023-06-26 PROCEDURE — 77012 CT SCAN FOR NEEDLE BIOPSY: CPT | Mod: XU

## 2023-06-26 PROCEDURE — 85049 AUTOMATED PLATELET COUNT: CPT | Performed by: RADIOLOGY

## 2023-06-26 PROCEDURE — 88333 PATH CONSLTJ SURG CYTO XM 1: CPT | Mod: TC | Performed by: INTERNAL MEDICINE

## 2023-06-26 PROCEDURE — 999N000065 XR CHEST PORT 1 VIEW

## 2023-06-26 PROCEDURE — 272N000710 CT LUNG MEDIASTINUM BIOPSY

## 2023-06-26 PROCEDURE — 85018 HEMOGLOBIN: CPT | Performed by: RADIOLOGY

## 2023-06-26 PROCEDURE — 250N000011 HC RX IP 250 OP 636: Mod: JZ | Performed by: RADIOLOGY

## 2023-06-26 PROCEDURE — 99152 MOD SED SAME PHYS/QHP 5/>YRS: CPT

## 2023-06-26 RX ORDER — FLUMAZENIL 0.1 MG/ML
0.2 INJECTION, SOLUTION INTRAVENOUS
Status: DISCONTINUED | OUTPATIENT
Start: 2023-06-26 | End: 2023-06-27 | Stop reason: HOSPADM

## 2023-06-26 RX ORDER — NALOXONE HYDROCHLORIDE 0.4 MG/ML
0.4 INJECTION, SOLUTION INTRAMUSCULAR; INTRAVENOUS; SUBCUTANEOUS
Status: DISCONTINUED | OUTPATIENT
Start: 2023-06-26 | End: 2023-06-27 | Stop reason: HOSPADM

## 2023-06-26 RX ORDER — OXYCODONE AND ACETAMINOPHEN 5; 325 MG/1; MG/1
1 TABLET ORAL
Status: DISCONTINUED | OUTPATIENT
Start: 2023-06-26 | End: 2023-06-27 | Stop reason: HOSPADM

## 2023-06-26 RX ORDER — ACETAMINOPHEN 325 MG/1
650 TABLET ORAL EVERY 4 HOURS PRN
Status: DISCONTINUED | OUTPATIENT
Start: 2023-06-26 | End: 2023-06-27 | Stop reason: HOSPADM

## 2023-06-26 RX ORDER — NALOXONE HYDROCHLORIDE 0.4 MG/ML
0.2 INJECTION, SOLUTION INTRAMUSCULAR; INTRAVENOUS; SUBCUTANEOUS
Status: DISCONTINUED | OUTPATIENT
Start: 2023-06-26 | End: 2023-06-27 | Stop reason: HOSPADM

## 2023-06-26 RX ORDER — FENTANYL CITRATE 50 UG/ML
25-50 INJECTION, SOLUTION INTRAMUSCULAR; INTRAVENOUS EVERY 5 MIN PRN
Status: DISCONTINUED | OUTPATIENT
Start: 2023-06-26 | End: 2023-06-27 | Stop reason: HOSPADM

## 2023-06-26 RX ORDER — IBUPROFEN 400 MG/1
400 TABLET, FILM COATED ORAL EVERY 6 HOURS PRN
Status: DISCONTINUED | OUTPATIENT
Start: 2023-06-26 | End: 2023-06-27 | Stop reason: HOSPADM

## 2023-06-26 RX ADMIN — MIDAZOLAM HYDROCHLORIDE 1 MG: 1 INJECTION, SOLUTION INTRAMUSCULAR; INTRAVENOUS at 10:24

## 2023-06-26 RX ADMIN — FENTANYL CITRATE 25 MCG: 50 INJECTION, SOLUTION INTRAMUSCULAR; INTRAVENOUS at 10:27

## 2023-06-26 NOTE — IP AVS SNAPSHOT
[facility]  [address] [address]  Phone: [phone]  Fax: [phone]                              After Visit Summary   6/26/2023    Gayla Tavera   MRN: [id number]           After Visit Summary Signature Page    I have received my discharge instructions, and my questions have been answered. I have discussed any challenges I see with this plan with the nurse or doctor.    ..........................................................................................................................................  Patient/Patient Representative Signature      ..........................................................................................................................................  Patient Representative Print Name and Relationship to Patient    ..................................................               ................................................  Date                                   Time    ..........................................................................................................................................  Reviewed by Signature/Title    ...................................................              ..............................................  Date                                               Time    22EPIC Rev 08/18

## 2023-06-26 NOTE — PROCEDURES
[facility]    Procedure: Imaging Procedure Note    Date/Time: 6/26/2023 10:41 AM    Performed by: Carlyle Dasilva MD  Authorized by: Carlyle Dasilva MD      UNIVERSAL PROTOCOL   Site Marked: Yes  Prior Images Obtained and Reviewed:  Yes  Required items: Required blood products, implants, devices and special equipment available    Patient identity confirmed:  Verbally with patient  Patient was reevaluated immediately before administering moderate or deep sedation or anesthesia  Confirmation Checklist:  Patient's identity using two indicators, relevant allergies, procedure was appropriate and matched the consent or emergent situation and correct equipment/implants were available  Time out: Immediately prior to the procedure a time out was called    Universal Protocol: the Joint Commission Universal Protocol was followed    Preparation: Patient was prepped and draped in usual sterile fashion      SEDATION  Patient Sedated: Yes    Vital signs: Vital signs monitored during sedation    See dictated procedure note for full details.    PROCEDURE  Describe Procedure: Ct RLL lung nodule biopsy and fiducial marker placement  Patient Tolerance:  Patient tolerated the procedure well with no immediate complications  Length of time physician/provider present for 1:1 monitoring during sedation: 25

## 2023-06-26 NOTE — PROGRESS NOTES
[facility] Radiation Oncology Consult Note     Patient: Gayla Tavera  MRN: [id number]  Date of Service: 06/27/2023          Sayra Dumont MD  [address] [place] [address]       Dear Dr. Dumont:    Thank you very much for referring this patient for consideration of radiotherapy. As you know Ms. Tavera is a 68 year old female with a diagnosis of synchronous lung cancer. . She was seen today for consideration of SBRT to RLL and RUL lung lesions.     HISTORY OF PRESENT ILLNESS:   Ms. Tavera is a 68 year old female who presented when a screening lung CT for high risk tobacco use showed 3 nodules. A PET scan was obtained and showed   1) 0.7 cm (max SUV 2.7) spiculated nodule RUL  2) 0.7 cm (max SUV 2.9) in RLL   3) 1.3 cm(max SUV 1.5) left lung apex     Focal uptake in 4th rib with associated fracture deformity. No evidence of metastases.                She underwent biopsy and fiducial placement to RLL lesion 6/26/2023.       She has no shortness of breath, dyspnea on exertion or chest pain but her daughter who is here with her states that her mother had complained of some chest tightness in the winter.  She presently has no wheezing, coughing, nighttime awakenings with pulmonary symptoms, fevers, chills, night sweats,     Quit smoking in 2021 but still vapes multiple times a day.      Family history- brother tobacco lung cancer, brother colon cancer, sister, cervical cancer,     CHEMOTHERAPY HISTORY: Concurrent Chemotherapy: No    RADIATION THERAPY HISTORY: Prior Radiation: No    IMPLANTED CARDIAC DEVICE: none         Current Outpatient Medications   Medication Sig Dispense Refill     buPROPion (WELLBUTRIN XL) 150 MG 24 hr tablet Take 1 tablet (150 mg) by mouth every morning (Patient not taking: Reported on 6/26/2023) 30 tablet 2     fenofibrate (LOFIBRA) 54 MG tablet Take 54 mg by mouth daily (Patient not taking: Reported on 6/26/2023)       traZODone (DESYREL) 50 MG tablet Take 0.5-1 tablets  (25-50 mg) by mouth At Bedtime (Patient not taking: Reported on 2023) 30 tablet 1     No past medical history on file.  No past surgical history on file.  No Known Allergies  Family History   Problem Relation Age of Onset     Ovarian Cancer Sister      Social History     Socioeconomic History     Marital status:      Spouse name: Not on file     Number of children: Not on file     Years of education: Not on file     Highest education level: Not on file   Occupational History     Not on file   Tobacco Use     Smoking status: Former     Packs/day: 1.00     Types: Other, Cigarettes     Quit date:      Years since quittin.4     Smokeless tobacco: Never     Tobacco comments:     vapes, no cigarettes   Vaping Use     Vaping Use: Every day     Substances: Nicotine     Devices: Disposable   Substance and Sexual Activity     Alcohol use: Yes     Comment: social      Drug use: No     Sexual activity: Not Currently   Other Topics Concern     Parent/sibling w/ CABG, MI or angioplasty before 65F 55M? Not Asked   Social History Narrative     Not on file     Social Determinants of Health     Financial Resource Strain: Not on file   Food Insecurity: Not on file   Transportation Needs: Not on file   Physical Activity: Not on file   Stress: Not on file   Social Connections: Not on file   Intimate Partner Violence: Not on file   Housing Stability: Not on file        REVIEW OF SYMPTOMS:  A full 14-point review of systems was performed. Pertinent findings are noted in the HPI.    General  Constitutional  Constitutional (WDL): All constitutional elements are within defined limits  EENT  Eye Disorders  Eye Disorder (WDL): Assessment not pertinent to visit  Ear Disorders  Ear Disorder (WDL): Assessment not pertinent to visit  Respiratory  Respiratory  Respiratory (WDL): Exceptions to WDL  Dyspnea: Shortness of breath with moderate exertion  Cardiovascular  Cardiovascular  Cardiovascular (WDL): All cardiovascular  elements are within defined limits  Gastrointestinal  Gastrointestinal  Gastrointestinal (WDL): All gastrointestinal elements are within defined limits  Musculoskeletal  Musculoskeletal and Connective Tissue Disorders  Musculoskeletal & Connective (WDL): All musculoskeletal & connective elements are within defined limits  Integumentary  Integumentary  Integumentary (WDL): All integumentary elements are within defined limits  Neurological  Neurosensory  Neurosensory (WDL): All neurosensory elements are within defined limits  Genitourinary/Reproductive  Genitourinary  Genitourinary (WDL): All genitourinary elements are within defined limits  Lymphatic  Lymph System Disorders  Lymph (WDL): All lymph elements are within defined limits  Pain  Pain Score: No Pain (0)  AUA Assessment                                                              Accompanied by  Accompanied By: self only    ECOG Status: 0 - Independent      Recent Labs:   Recent Results (from the past 168 hour(s))   INR   Result Value Ref Range    INR 1.13 0.85 - 1.15   Hemoglobin   Result Value Ref Range    Hemoglobin 10.2 (L) 11.7 - 15.7 g/dL   Platelet count   Result Value Ref Range    Platelet Count 289 150 - 450 10e3/uL   Surgical Pathology Exam   Result Value Ref Range    Case Report       Surgical Pathology Report                         Case: [id number]                                  Authorizing Provider:  Sayra Dumont MD          Collected:           06/26/2023 10:41 AM          Ordering Location:     [facility]      Received:            06/26/2023 10:55 AM                                 [facility] CT                                                           Pathologist:           Pauline Yost MD                                                           Specimen:    Lung, Right                                                                                Final Diagnosis       LUNG NODULE, RIGHT LOWER LOBE, CT-GUIDED CORE  BIOPSIES:        - NON-SMALL CELL CARCINOMA, FAVOR WELL DIFFERENTIATED ADENOCARCINOMA, LEPIDIC PREDOMINANT           TYPE           a.  CONSISTENT WITH PULMONARY PRIMARY SOURCE           b.  PD-L1: LOW EXPRESSION        Comment       Dr. Shailseh French concurs with the diagnosis and stain interpretation.      Clinical Information       Clinical history:  PET positive lung nodules  Reason for procedure:  Tissue type      Gross Description       A(A). Lung, Right, :  Biopsy was performed under CT guidance by Dr. Carlyle Dasilva with 5 pass(es) from which:     5 Air-driedsmear(s)   1 Vial of cores in formalin @1030 on 6/26/23    Assisted by: GIL Woods     GROSS DESCRIPTION:  The specimen consists of five 0.2 to 0.7 x 0.1 cm cylindrical fragments of pink tissue.  TE-1C    IMMEDIATE CYTOLOGIC EVALUATION (CORE IMPRINTS):  Site #1, Episode #1, # Passes 5: Adequate.   Pauline Yost MD       Microscopic Description       Imprint smears are variably cellular.  There are moderate numbers of histiocytes and benign respiratory epithelial cells.  In addition there are occasional clusters and sheets of medium sized round epithelial cells with moderate nuclear enlargement and focal cellular crowding, suspicious for adenocarcinoma.    Core biopsies demonstrate alveolar lung parenchyma.  The alveolar lining cells are focally replaced by medium sized cuboidal to columnar epithelial cells with enlarged medium sized oval to angulated nuclei, focal small nucleoli and moderate amounts of eosinophilic cytoplasm, with loss of basal nuclear polarity.  The tumor morphology is consistent with a lepidic predominant (bronchioloalveolar) adenocarcinoma.  Underlying stroma demonstrates patchy chronic inflammation.  Stromal invasive tumor is not identified.      Immunohistochemical stains are applied for further tumor cell characterization.  Controls stain appropriately.  The lesional cells demonstrate the following staining pattern:  Cytokeratin  7: Strongly positive  Cytokeratin 20: Weakly positive  TTF-1: Strong nuclear staining  P63: Moderate nuclear staining    The immunohistochemical stain pattern favors a lung adenocarcinoma with aberrant p63 staining.    RESULT FOR IMMUNOHISTOCHEMICAL VENTANA CLONE  PD-L1 ASSAY  TUMOR PROPORTION SCORE (TPS): 45%  INTERPRETATION: LOW PD-L1 EXPRESSION (TPS >/=1-49%)    COMMENT:  This Cutler  PD-L1 immunohistochemistry antibody assay is a laboratory developed test to be used for patients with non-small cell lung carcinoma (NSCLC), gastric or gastroesophageal junction (GEJ) adenocarcinoma, urothelial carcinomas, melanomas, liver, breast and brain tumors who are being considered for treatment with Keytruda (Pembrolizumab), an anti-PD-1 immune checkpoint inhibitor. The Cutler  PD-L1 assay has been validated by the [facility] against the FDA approved clinical trial-validated PharmDx 22C3 PD-L1 assay. Evidence suggests that the level of PD-L1 expression in the tumor cell population by immunohistochemistry is a major predictor of response to checkpoint inhibitor therapy. Previous studies demonstrate a high correlation between PD-L1 immunohistochemistry expression data obtained with PD-L1 clones Dako 22C3 and Cutler  in NSCLC. (References: Lancet 387:1540-50, 2016; :1823-33, 2016; J Clin Oncol 34:4102-9. 2016; J Thorac Oncol 12:1654-63, 2017; [facility] PD-L1 2018 assessment)  Scoring system: The tumor proportion score (TPS) is determined by enumeration of the percentage of PD-L1 tumor cells with any amount of membrane positivity expressed as a whole number relative to all viable tumor cells in the specimen. The scoring system for PD-L1 expression is divided into three groups: a) High expressor, for tumors with TPS >/= 50%; b) Low expressor, for tumors with TPS of >/=1%-49%; and c) Negative, for tumors with TPS <1%. If CPS score is reported,  it is calculated based on the following formula: [(PD-L1 positive tumor cells + PD-L1 positive mononuclear inflammatory cells)/Total tumor cells] x 100.  Assay conditions:  - Fixation and processing: 10% neutral buffered formalin, paraffin embedded.  - Staining method: [place] predilute monoclonal PD-L1 antibody clone , standard heat induced epitope retrieval in cell conditioning 1 (CC1 - EDTA, alkaline pH), primary antibody incubation 16 minutes, [place] Optiview detection kit, and [place] BenchMark Ultra automated instrument.  - Minimum tumor cell requirement: >/= 100 viable tumor cells present in the specimen.  - Positive and negative controls react appropriately.         MCRS Yes (A) N/A    Performing Labs       The technical component of this testing was completed at [facility]      Case Images         Imaging: Imaging results 6 weeks:XR Chest Port 1 View    Result Date: 6/26/2023  EXAM: XR CHEST PORT 1 VIEW LOCATION: [facility] DATE: 6/26/2023 INDICATION: Post right lower lobe lung nodule biopsy. COMPARISON: Lung nodule biopsy earlier today.     IMPRESSION: No pneumothorax post right lung nodule biopsy. Fiducial markers were in the region of the nodule.    CT Radiation Therapy Fiducial Placement    Result Date: 6/26/2023  EXAM: 1. PERCUTANEOUS BIOPSY RIGHT LOWER LOBE LUNG NODULE. FIDUCIAL PLACEMENT INTO RIGHT LOWER LOBE LUNG NODULE. 2. CT GUIDANCE 3. CONSCIOUS SEDATION LOCATION: [facility] DATE: 6/26/2023 INDICATION: Right lower lobe lung nodule. Abnormal positron emission tomography (PET) of lung, Pulmonary nodules, Pulmonary emphysema, unspecified emphysema type (H) TECHNIQUE: Dose reduction techniques were used. PROCEDURE: Informed consent obtained. Site marked. Prior images reviewed. Required items made available. Patient identity confirmed verbally and with arm band. Patient reevaluated  immediately before administering sedation. Universal protocol was followed. Time out performed. The site was prepped and draped in sterile fashion. 10 mL of 1% lidocaine was infused into the local soft tissues. Using standard technique and under direct CT guidance, a 20-gauge biopsy device was used to obtain 5 core biopsies. Tissue was submitted to Pathology and was adequate by preliminary review by a pathologist. Image guided placement of fiducial marker into right lower lobe lung nodule with 2 fiducial markers placed. Good position. The patient tolerated the procedure well. No immediate complications. SEDATION: Versed 1.0 mg. Fentanyl 50 mcg. The procedure was performed with administration intravenous conscious sedation with appropriate preoperative, intraoperative, and postoperative evaluation. 25 minutes of supervised face to face conscious sedation time was provided by a radiology nurse under my direct supervision.     IMPRESSION: 1.  Successful CT-guided biopsy right lower lobe lung nodule. 2.  Successful CT-guided placement of fiducial markers into right lower lobe lung nodule. 3.  Conscious sedation. Reference CPT Codes: 20763, 76512, 25364, 78308    CT Lung Mediastinum Biopsy    Result Date: 6/26/2023  EXAM: 1. PERCUTANEOUS BIOPSY RIGHT LOWER LOBE LUNG NODULE. FIDUCIAL PLACEMENT INTO RIGHT LOWER LOBE LUNG NODULE. 2. CT GUIDANCE 3. CONSCIOUS SEDATION LOCATION: [facility] DATE: 6/26/2023 INDICATION: Right lower lobe lung nodule. Abnormal positron emission tomography (PET) of lung, Pulmonary nodules, Pulmonary emphysema, unspecified emphysema type (H) TECHNIQUE: Dose reduction techniques were used. PROCEDURE: Informed consent obtained. Site marked. Prior images reviewed. Required items made available. Patient identity confirmed verbally and with arm band. Patient reevaluated immediately before administering sedation. Universal protocol was followed. Time out performed. The site was prepped  and draped in sterile fashion. 10 mL of 1% lidocaine was infused into the local soft tissues. Using standard technique and under direct CT guidance, a 20-gauge biopsy device was used to obtain 5 core biopsies. Tissue was submitted to Pathology and was adequate by preliminary review by a pathologist. Image guided placement of fiducial marker into right lower lobe lung nodule with 2 fiducial markers placed. Good position. The patient tolerated the procedure well. No immediate complications. SEDATION: Versed 1.0 mg. Fentanyl 50 mcg. The procedure was performed with administration intravenous conscious sedation with appropriate preoperative, intraoperative, and postoperative evaluation. 25 minutes of supervised face to face conscious sedation time was provided by a radiology nurse under my direct supervision.     IMPRESSION: 1.  Successful CT-guided biopsy right lower lobe lung nodule. 2.  Successful CT-guided placement of fiducial markers into right lower lobe lung nodule. 3.  Conscious sedation. Reference CPT Codes: 82964, 25526, 93607, 76672    PET Oncology (Eyes to Thighs)    Result Date: 6/6/2023  EXAM: PET ONCOLOGY (EYES TO THIGHS) LOCATION: [facility] DATE/TIME: 6/5/2023 2:49 PM CDT INDICATION: Other nonspecific abnormal finding of lung field, multiple pulmonary nodules COMPARISON: Thoracic CT dated 05/16/2023 CONTRAST: None TECHNIQUE: Serum glucose level 92 mg/dL. One hour post intravenous administration of 10.2 mCi F-18 FDG, PET imaging was performed from the skull vertex to mid thighs, utilizing attenuation correction with concurrent axial CT and PET/CT image fusion. Dose  reduction techniques were used. PET/CT FINDINGS: Mildly FDG avid spiculated nodule in the right upper lobe measuring 0.7 x 0.5 cm (max SUV 2.7), spiculated nodule in the right lower lobe measuring 0.7 x 0.7 cm (max SUV 2.9), and mildly FDG avid nodularity with associated cystic change in the left lung apex measuring  1.3 x 0.8 cm (max SUV 1.5) suspicious for early synchronous primary lung neoplasms of low metabolic activity such as adenocarcinomas. Focal FDG uptake involving the right anterior fourth rib with associated fracture deformity (max SUV 3.9) likely representing benign post traumatic inflammatory change. Diffuse esophagitis. Shoulder synovitis. CT FINDINGS: Mild senescent intracranial changes. Postoperative change of the right lens. Mild to moderate emphysema. Breast implants with in situ linear densities suggesting sequelae of prior intracapsular rupture. No significant coronary artery calcium. Sigmoid diverticulosis. Pelvic phleboliths. Multilevel degenerative changes of the spine.     IMPRESSION: Findings suspicious for early lung adenocarcinomas in the right upper lobe, right lower lobe, and left lung apex without metastasis.      Pathology: Right lower lobe    [facility]  [address], [address]  [place] [address]  P: [phone]  F: [phone]  Client MRN:  Patient Room/Bed Number:  Submitter:  Client ID:  Report To: Laboratory Report Status:  Gayla Tavera [id number]  F, 1955  Surgical Pathology Report (Final result) [id number]  Authorizing Provider: Sayra Dumont MD Ordering Provider: Sayra Dumont MD  Ordering Location: [facility] CT  Collected: 06/26/2023 10:41 AM  Pathologist: Pauline Yost MD Received: 06/26/2023 10:55 AM  .  Specimens  A Lung, Right  .  .  Final Diagnosis  LUNG NODULE, RIGHT LOWER LOBE, CT-GUIDED CORE BIOPSIES:  - NON-SMALL CELL CARCINOMA, FAVOR WELL DIFFERENTIATED ADENOCARCINOMA, LEPIDIC PREDOMINANT  TYPE  a. CONSISTENT WITH PULMONARY PRIMARY SOURCE  b. PD-L1: LOW EXPRESSION  Electronically signed by Pauline Yost MD on 6/27/2023 at 11:24 AM            Objective:          PHYSICAL EXAMINATION:    /71 (BP Location: Left arm, Patient Position: Sitting)   Pulse 74   Temp 98.6  F (37  C)    Resp 16   Wt 57.1 kg (125 lb 12.8 oz)   SpO2 99%   BMI 23.01 kg/m      Gen: Alert, in NAD  Eyes: PER sclera anicteric  HENT: NC/AT  Neck: Supple  Pulm: No increased work of breathing, speaks in complete sentences, clear to auscultation   CV: Well-perfused, no cyanosis   Musculoskeletal: No MSK defects noted.  Skin: Normal color and turgor  Neurologic: Nonfocal  Psychiatric: Appropriate mood and affect    Intent of Therapy: Curative  Side effects that may occur during or within weeks after Radiation Therapy      Fatigue and general weakness     Darkening, irritation, itchiness, redness, dryness and peeling of the skin of the chest    Loss of chest and armpit hair    Painful swallowing limiting solid and liquid intake and causing dehydration    Nausea, vomiting and decrease in appetite    Side effects that may occur months or years after Radiation Therapy       Development of another tumor or cancer    Lung inflammation or fibrosis causing cough, fever, and shortness of breath     Fracture of ribs    Permanent narrowing or obstruction of the esophagus    Fluid compressing the lung (pleural effusion)    Coronary artery blockage causing angina pain or a heart attack    Thickening, telangiectasias (development of spider like blood vessels in the skin) and ulceration of the skin of the chest    Poor healing after a trauma or surgery in the irradiated areas    Nerve damage resulting in loss of strength or sensation    The risks, benefits and alternatives to radiation therapy were outlined with the patient. All questions were answered and a consent was signed.                          Impression   (C34.11) Malignant neoplasm of upper lobe of right lung (H)  (C34.31) Malignant neoplasm of lower lobe of right lung (H)          Assessment & Plan:   69 yo with three  PET avid pulmonary lung nodules. The RLL nodule is biopsy positive for lepidic adenocarcinoma (fiducial placed), RUL less avid not biopsied, and ANTONI least PET  pierre.     IN discussion with Dr Dumont, the plan is to treat the two in right lung and follow the left lung nodule.  The RLL nodule being close to the diaphragm requires a fiducial to track movement, but the RUL may not move enough to require fiducial.  At time of simulation we will see if the RUL lesion is visible enough for treatment without a fiducial. If visibility an issue will treat RLL and get fiducial in RUL and treat sequentially.     ADDENDUM:  The RUL lesion is not visible to accurately treat without fiducial.  WIll treat RLL, then observe until RUL more dense then treat without fiducial.     Multiple cancers in family. Genetic testing done 7/26/2022 (expanded panel) and was negative. .     Quit tobacco but still vapes multiple times a day. Cessation discussed.      Gayla's daughter was on phone throughout encounter.       Again, thank you very much for the referral and allowing me to participate in the care of this patient.  If you have any questions or concerns about this consultation, please do not hesitate to call. 60 minutes was spent to review chart, personally review pathology, personally review serial images with patient, visit, radiation planning, documentation.       Sincerely,      Guillermina Lynne MD  Department of Radiation Oncology   [facility] Radiation Oncology  Tel: [phone]  Page: [phone]    [facility]  [address] [place] [address]     [address] [facility]    [place] [address]    CC:  Patient Care Team:  Mari Nuñez DO as PCP - General (Family Medicine)  Mari Nuñez DO as Assigned PCP  Gale Osullivan AuD as Audiologist (Audiology)  Cody Good MD as MD (Otolaryngology)  Cody Good MD as MD (Otolaryngology)  Cody Good MD as Assigned Surgical Provider  Sayra Dumont MD as Assigned Pulmonology Provider

## 2023-06-26 NOTE — PRE-PROCEDURE
GENERAL PRE-PROCEDURE:   Procedure:  RLL lung nodule biopsy  Date/Time:  6/26/2023 10:23 AM    Written consent obtained?: Yes    Risks and benefits: Risks, benefits and alternatives were discussed    Consent given by:  Patient  Patient states understanding of procedure being performed: Yes    Patient's understanding of procedure matches consent: Yes    Procedure consent matches procedure scheduled: Yes    Expected level of sedation:  Moderate  Appropriately NPO:  Yes  Mallampati  :  Grade 1- soft palate, uvula, tonsillar pillars, and posterior pharyngeal wall visible  Lungs:  Lungs clear with good breath sounds bilaterally  Heart:  Normal heart sounds and rate  History & Physical reviewed:  History and physical reviewed and no updates needed  Statement of review:  I have reviewed the lab findings, diagnostic data, medications, and the plan for sedation

## 2023-06-26 NOTE — DISCHARGE INSTRUCTIONS
LUNG BIOPSY    1. You are required to have someone accompany you home. Do not drive or operate machinery today as the medication may cause sleepiness.    2. Rest today and avoid strenuous activity or heavy lifting for 48 hours. Over-activity may produce dizziness and or nausea.    3. You should follow your normal diet. Drink plenty of fluids. No alcoholic beverages for 24 hours. *(Alcohol may interact with the medications you received today)    4. Leave bandage on today, you may remove tomorrow.    5. You may shower tomorrow. Do not soak in a bath tub, hot tub, or swim until the site is completely healed and the skin glue is off. Keep the site clean and dry.    6. Watch your biopsy site for signs of infection, increase pain, redness, swelling, or any drainage and or fever or chills.    7. If you experience sudden weakness, dizziness, shortness of breath, a temperature above 100.0 degree F for more than 24 hours call your doctor or go to the emergency room.        Discharge Instructions Needle Biopsy: Lung    You had a needle biopsy of one of your lungs. In this procedure, a hollow needle is used to take one or more samples of your lung tissue. The tissue is then examined under a microscope. There are several different types of needle biopsies. Two types are:   Fine needle aspiration. A small amount of tissue is withdrawn (aspirated) using a very fine needle.  Core biopsy. A larger tissue sample is removed for examination.  A biopsy needle is inserted through your skin into your chest and lung. This is called a transthoracic approach, which means across or through the chest (thorax). Scans are done at the same time so that your provider can find the area where he or she would like to sample tissue. Needle biopsies don't require cuts or incisions into the body like open biopsies.     Your healthcare provider will use the results of your biopsy to help diagnose your condition.     Home care  The site of the biopsy may  feel numb for a while if you got numbing medicine.  You might have a little soreness after the needle biopsy.  Follow your healthcare provider's instructions about removing bandages and showering or bathing.  You may be sleepy after the biopsy if you got medicine to help you relax (sedation). Don't drive until the next day or as instructed by your healthcare provider.  Don't do heavy lifting, a lot of stair climbing, vigorous exercise, or take part in sports the day of your biopsy. You can get back to your regular activities as instructed by your healthcare provider.     Follow-up care  Follow up with your healthcare provider, or as advised. Be sure you make an appointment with your healthcare provider to discuss the biopsy results.     When to call your healthcare provider  Call your healthcare provider right away if any of these occur:   Infection. You might have redness, pain, swelling, or drainage at the site of your biopsies.  Bleeding. You might also have bleeding at the site of your biopsies.  Coughing up blood. This may only be a small amount.    Call 911  Call 911 right away if any of these occur:   Collapsed lung (pneumothorax). This means that air from your lungs leaks out into the spaces between your lungs and chest wall. It can lead to feeling short of breath, pain with breathing, trouble breathing, and a collapsed lung.  Fast pulse  Sharp pains in your chest or shoulder  Fingernails, lips, or skin turn blue, purple, or gray  Trouble walking or talking  Feeling faint or dizzy    [employer] last reviewed this educational content on 10/1/2019    8393-4823 [employer]. All rights reserved. This information is not intended as a substitute for professional medical care. Always follow your healthcare professional's instructions.

## 2023-06-27 ENCOUNTER — PRE VISIT (OUTPATIENT)
Dept: RADIATION ONCOLOGY | Facility: HOSPITAL | Age: 68
End: 2023-06-27
Payer: COMMERCIAL

## 2023-06-27 ENCOUNTER — OFFICE VISIT (OUTPATIENT)
Dept: RADIATION ONCOLOGY | Facility: HOSPITAL | Age: 68
End: 2023-06-27
Attending: INTERNAL MEDICINE
Payer: COMMERCIAL

## 2023-06-27 VITALS
WEIGHT: 125.8 LBS | HEART RATE: 74 BPM | DIASTOLIC BLOOD PRESSURE: 71 MMHG | SYSTOLIC BLOOD PRESSURE: 107 MMHG | BODY MASS INDEX: 23.01 KG/M2 | OXYGEN SATURATION: 99 % | RESPIRATION RATE: 16 BRPM | TEMPERATURE: 98.6 F

## 2023-06-27 DIAGNOSIS — R94.2 ABNORMAL POSITRON EMISSION TOMOGRAPHY (PET) OF LUNG: ICD-10-CM

## 2023-06-27 DIAGNOSIS — R91.8 PULMONARY NODULES: ICD-10-CM

## 2023-06-27 DIAGNOSIS — C34.11 MALIGNANT NEOPLASM OF UPPER LOBE OF RIGHT LUNG (H): ICD-10-CM

## 2023-06-27 DIAGNOSIS — C34.31 MALIGNANT NEOPLASM OF LOWER LOBE OF RIGHT LUNG (H): ICD-10-CM

## 2023-06-27 LAB
PATH REPORT.COMMENTS IMP SPEC: ABNORMAL
PATH REPORT.COMMENTS IMP SPEC: YES
PATH REPORT.FINAL DX SPEC: ABNORMAL
PATH REPORT.GROSS SPEC: ABNORMAL
PATH REPORT.MICROSCOPIC SPEC OTHER STN: ABNORMAL
PATH REPORT.RELEVANT HX SPEC: ABNORMAL
PHOTO IMAGE: ABNORMAL

## 2023-06-27 PROCEDURE — G0463 HOSPITAL OUTPT CLINIC VISIT: HCPCS | Mod: 25 | Performed by: RADIOLOGY

## 2023-06-27 PROCEDURE — 77470 SPECIAL RADIATION TREATMENT: CPT | Mod: 26 | Performed by: RADIOLOGY

## 2023-06-27 PROCEDURE — 88341 IMHCHEM/IMCYTCHM EA ADD ANTB: CPT | Mod: 26 | Performed by: PATHOLOGY

## 2023-06-27 PROCEDURE — 88305 TISSUE EXAM BY PATHOLOGIST: CPT | Mod: 26 | Performed by: PATHOLOGY

## 2023-06-27 PROCEDURE — 77470 SPECIAL RADIATION TREATMENT: CPT | Performed by: RADIOLOGY

## 2023-06-27 PROCEDURE — 88342 IMHCHEM/IMCYTCHM 1ST ANTB: CPT | Mod: 26 | Performed by: PATHOLOGY

## 2023-06-27 PROCEDURE — 88333 PATH CONSLTJ SURG CYTO XM 1: CPT | Mod: 26 | Performed by: PATHOLOGY

## 2023-06-27 PROCEDURE — 77263 THER RADIOLOGY TX PLNG CPLX: CPT | Performed by: RADIOLOGY

## 2023-06-27 PROCEDURE — 99205 OFFICE O/P NEW HI 60 MIN: CPT | Mod: 25 | Performed by: RADIOLOGY

## 2023-06-27 PROCEDURE — 88360 TUMOR IMMUNOHISTOCHEM/MANUAL: CPT | Mod: 26 | Performed by: PATHOLOGY

## 2023-06-27 ASSESSMENT — PAIN SCALES - GENERAL: PAINLEVEL: NO PAIN (0)

## 2023-06-27 NOTE — PROGRESS NOTES
"Oncology Rooming Note    June 27, 2023 1:17 PM   Gayla Tavera is a 68 year old female who presents for:    Chief Complaint   Patient presents with     Oncology Clinic Visit     Lung Cancer     New consult     Initial Vitals: /71 (BP Location: Left arm, Patient Position: Sitting)   Pulse 74   Temp 98.6  F (37  C)   Resp 16   Wt 57.1 kg (125 lb 12.8 oz)   SpO2 99%   BMI 23.01 kg/m   Estimated body mass index is 23.01 kg/m  as calculated from the following:    Height as of 5/25/23: 1.575 m (5' 2\").    Weight as of this encounter: 57.1 kg (125 lb 12.8 oz). Body surface area is 1.58 meters squared.  No Pain (0) Comment: Data Unavailable   No LMP recorded. Patient is postmenopausal.  Allergies reviewed: Yes  Medications reviewed: Yes    Medications: Medication refills not needed today.  Pharmacy name entered into [facility]: [id number] [place] [address]    Clinical concerns: New consult, wants to know type of lung cancer Dr. Lynne was notified.     No previous radiation  No ICD/pacemaker  No pregnancy    Radiation Therapy Patient Education    Person involved with teaching: Patient    Patient educational needs for self management of treatment-related side effects assessment completed.  EPIC Patient Ed tab contains Patient Learning Assessment    Education Materials Given  Radiation Therapy and You, Skin Care During Radiation Treatment and Coping with Fatigue    Educational Topics Discussed  Side effects expected, Pain management, Skin care, Activity, Nutrition and weight loss and When to call MD/RN    Response To Teaching  More review necessary and Verbalizes understanding    GYN Only  Vaginal Dilator-given and educated: N/A    Referrals sent: None    Chemotherapy?  No          Melany David RN            "

## 2023-06-27 NOTE — LETTER
6/27/2023         RE: Gayla Tavera  [address] [facility] [address] [address]        Dear Colleague,    Thank you for referring your patient, Gayla Tavera, to the [facility] RADIATION ONCOLOGY [place]. Please see a copy of my visit note below.    [facility] Radiation Oncology Consult Note     Patient: Gayla Tavera  MRN: [id number]  Date of Service: 06/27/2023          Sayra Dumont MD  [address] [place] [address]       Dear Dr. Dumont:    Thank you very much for referring this patient for consideration of radiotherapy. As you know Ms. Tavera is a 68 year old female with a diagnosis of synchronous lung cancer. . She was seen today for consideration of SBRT to RLL and RUL lung lesions.     HISTORY OF PRESENT ILLNESS:   Ms. Tavera is a 68 year old female who presented when a screening lung CT for high risk tobacco use showed 3 nodules. A PET scan was obtained and showed   1) 0.7 cm (max SUV 2.7) spiculated nodule RUL  2) 0.7 cm (max SUV 2.9) in RLL   3) 1.3 cm(max SUV 1.5) left lung apex     Focal uptake in 4th rib with associated fracture deformity. No evidence of metastases.                She underwent biopsy and fiducial placement to RLL lesion 6/26/2023.       She has no shortness of breath, dyspnea on exertion or chest pain but her daughter who is here with her states that her mother had complained of some chest tightness in the winter.  She presently has no wheezing, coughing, nighttime awakenings with pulmonary symptoms, fevers, chills, night sweats,     Quit smoking in 2021 but still vapes multiple times a day.      Family history- brother tobacco lung cancer, brother colon cancer, sister, cervical cancer,     CHEMOTHERAPY HISTORY: Concurrent Chemotherapy: No    RADIATION THERAPY HISTORY: Prior Radiation: No    IMPLANTED CARDIAC DEVICE: none         Current Outpatient Medications   Medication Sig Dispense Refill     buPROPion (WELLBUTRIN XL) 150 MG 24 hr tablet Take  1 tablet (150 mg) by mouth every morning (Patient not taking: Reported on 2023) 30 tablet 2     fenofibrate (LOFIBRA) 54 MG tablet Take 54 mg by mouth daily (Patient not taking: Reported on 2023)       traZODone (DESYREL) 50 MG tablet Take 0.5-1 tablets (25-50 mg) by mouth At Bedtime (Patient not taking: Reported on 2023) 30 tablet 1     No past medical history on file.  No past surgical history on file.  No Known Allergies  Family History   Problem Relation Age of Onset     Ovarian Cancer Sister      Social History     Socioeconomic History     Marital status:      Spouse name: Not on file     Number of children: Not on file     Years of education: Not on file     Highest education level: Not on file   Occupational History     Not on file   Tobacco Use     Smoking status: Former     Packs/day: 1.00     Types: Other, Cigarettes     Quit date:      Years since quittin.4     Smokeless tobacco: Never     Tobacco comments:     vapes, no cigarettes   Vaping Use     Vaping Use: Every day     Substances: Nicotine     Devices: Disposable   Substance and Sexual Activity     Alcohol use: Yes     Comment: social      Drug use: No     Sexual activity: Not Currently   Other Topics Concern     Parent/sibling w/ CABG, MI or angioplasty before 65F 55M? Not Asked   Social History Narrative     Not on file     Social Determinants of Health     Financial Resource Strain: Not on file   Food Insecurity: Not on file   Transportation Needs: Not on file   Physical Activity: Not on file   Stress: Not on file   Social Connections: Not on file   Intimate Partner Violence: Not on file   Housing Stability: Not on file        REVIEW OF SYMPTOMS:  A full 14-point review of systems was performed. Pertinent findings are noted in the HPI.    General  Constitutional  Constitutional (WDL): All constitutional elements are within defined limits  EENT  Eye Disorders  Eye Disorder (WDL): Assessment not pertinent to visit  Ear  Disorders  Ear Disorder (WDL): Assessment not pertinent to visit  Respiratory  Respiratory  Respiratory (WDL): Exceptions to WDL  Dyspnea: Shortness of breath with moderate exertion  Cardiovascular  Cardiovascular  Cardiovascular (WDL): All cardiovascular elements are within defined limits  Gastrointestinal  Gastrointestinal  Gastrointestinal (WDL): All gastrointestinal elements are within defined limits  Musculoskeletal  Musculoskeletal and Connective Tissue Disorders  Musculoskeletal & Connective (WDL): All musculoskeletal & connective elements are within defined limits  Integumentary  Integumentary  Integumentary (WDL): All integumentary elements are within defined limits  Neurological  Neurosensory  Neurosensory (WDL): All neurosensory elements are within defined limits  Genitourinary/Reproductive  Genitourinary  Genitourinary (WDL): All genitourinary elements are within defined limits  Lymphatic  Lymph System Disorders  Lymph (WDL): All lymph elements are within defined limits  Pain  Pain Score: No Pain (0)  AUA Assessment                                                              Accompanied by  Accompanied By: self only    ECOG Status: 0 - Independent      Recent Labs:   Recent Results (from the past 168 hour(s))   INR   Result Value Ref Range    INR 1.13 0.85 - 1.15   Hemoglobin   Result Value Ref Range    Hemoglobin 10.2 (L) 11.7 - 15.7 g/dL   Platelet count   Result Value Ref Range    Platelet Count 289 150 - 450 10e3/uL   Surgical Pathology Exam   Result Value Ref Range    Case Report       Surgical Pathology Report                         Case: [id number]                                  Authorizing Provider:  Sayra Dumont MD          Collected:           06/26/2023 10:41 AM          Ordering Location:     [facility]      Received:            06/26/2023 10:55 AM                                 [facility] CT                                                           Pathologist:            Pauline Yost MD                                                           Specimen:    Lung, Right                                                                                Final Diagnosis       LUNG NODULE, RIGHT LOWER LOBE, CT-GUIDED CORE BIOPSIES:        - NON-SMALL CELL CARCINOMA, FAVOR WELL DIFFERENTIATED ADENOCARCINOMA, LEPIDIC PREDOMINANT           TYPE           a.  CONSISTENT WITH PULMONARY PRIMARY SOURCE           b.  PD-L1: LOW EXPRESSION        Comment       Dr. Shailesh French concurs with the diagnosis and stain interpretation.      Clinical Information       Clinical history:  PET positive lung nodules  Reason for procedure:  Tissue type      Gross Description       A(A). Lung, Right, :  Biopsy was performed under CT guidance by Dr. Carlyle Dasilva with 5 pass(es) from which:     5 Air-driedsmear(s)   1 Vial of cores in formalin @1030 on 6/26/23    Assisted by: GIL Woods     GROSS DESCRIPTION:  The specimen consists of five 0.2 to 0.7 x 0.1 cm cylindrical fragments of pink tissue.  TE-1C    IMMEDIATE CYTOLOGIC EVALUATION (CORE IMPRINTS):  Site #1, Episode #1, # Passes 5: Adequate.   Pauline Yost MD       Microscopic Description       Imprint smears are variably cellular.  There are moderate numbers of histiocytes and benign respiratory epithelial cells.  In addition there are occasional clusters and sheets of medium sized round epithelial cells with moderate nuclear enlargement and focal cellular crowding, suspicious for adenocarcinoma.    Core biopsies demonstrate alveolar lung parenchyma.  The alveolar lining cells are focally replaced by medium sized cuboidal to columnar epithelial cells with enlarged medium sized oval to angulated nuclei, focal small nucleoli and moderate amounts of eosinophilic cytoplasm, with loss of basal nuclear polarity.  The tumor morphology is consistent with a lepidic predominant (bronchioloalveolar) adenocarcinoma.  Underlying stroma demonstrates patchy chronic  inflammation.  Stromal invasive tumor is not identified.      Immunohistochemical stains are applied for further tumor cell characterization.  Controls stain appropriately.  The lesional cells demonstrate the following staining pattern:  Cytokeratin 7: Strongly positive  Cytokeratin 20: Weakly positive  TTF-1: Strong nuclear staining  P63: Moderate nuclear staining    The immunohistochemical stain pattern favors a lung adenocarcinoma with aberrant p63 staining.    RESULT FOR IMMUNOHISTOCHEMICAL VENTANA CLONE  PD-L1 ASSAY  TUMOR PROPORTION SCORE (TPS): 45%  INTERPRETATION: LOW PD-L1 EXPRESSION (TPS >/=1-49%)    COMMENT:  This National  PD-L1 immunohistochemistry antibody assay is a laboratory developed test to be used for patients with non-small cell lung carcinoma (NSCLC), gastric or gastroesophageal junction (GEJ) adenocarcinoma, urothelial carcinomas, melanomas, liver, breast and brain tumors who are being considered for treatment with Keytruda (Pembrolizumab), an anti-PD-1 immune checkpoint inhibitor. The National  PD-L1 assay has been validated by the [facility] against the FDA approved clinical trial-validated PharmDx 22C3 PD-L1 assay. Evidence suggests that the level of PD-L1 expression in the tumor cell population by immunohistochemistry is a major predictor of response to checkpoint inhibitor therapy. Previous studies demonstrate a high correlation between PD-L1 immunohistochemistry expression data obtained with PD-L1 clones Dako 22C3 and National  in NSCLC. (References: Lancet 387:1540-50, 2016; :1823-33, 2016; J Clin Oncol 34:4102-9. 2016; J Thorac Oncol 12:1654-63, 2017; [facility] PD-L1 2018 assessment)  Scoring system: The tumor proportion score (TPS) is determined by enumeration of the percentage of PD-L1 tumor cells with any amount of membrane positivity expressed as a whole number relative to all viable tumor cells in  the specimen. The scoring system for PD-L1 expression is divided into three groups: a) High expressor, for tumors with TPS >/= 50%; b) Low expressor, for tumors with TPS of >/=1%-49%; and c) Negative, for tumors with TPS <1%. If CPS score is reported, it is calculated based on the following formula: [(PD-L1 positive tumor cells + PD-L1 positive mononuclear inflammatory cells)/Total tumor cells] x 100.  Assay conditions:  - Fixation and processing: 10% neutral buffered formalin, paraffin embedded.  - Staining method: [place] predilute monoclonal PD-L1 antibody clone , standard heat induced epitope retrieval in cell conditioning 1 (CC1 - EDTA, alkaline pH), primary antibody incubation 16 minutes, [place] Optiview detection kit, and "[employer] BenchMark Ultra automated instrument.  - Minimum tumor cell requirement: >/= 100 viable tumor cells present in the specimen.  - Positive and negative controls react appropriately.         MCRS Yes (A) N/A    Performing Labs       The technical component of this testing was completed at [facility]      Case Images         Imaging: Imaging results 6 weeks:XR Chest Port 1 View    Result Date: 6/26/2023  EXAM: XR CHEST PORT 1 VIEW LOCATION: [facility] DATE: 6/26/2023 INDICATION: Post right lower lobe lung nodule biopsy. COMPARISON: Lung nodule biopsy earlier today.     IMPRESSION: No pneumothorax post right lung nodule biopsy. Fiducial markers were in the region of the nodule.    CT Radiation Therapy Fiducial Placement    Result Date: 6/26/2023  EXAM: 1. PERCUTANEOUS BIOPSY RIGHT LOWER LOBE LUNG NODULE. FIDUCIAL PLACEMENT INTO RIGHT LOWER LOBE LUNG NODULE. 2. CT GUIDANCE 3. CONSCIOUS SEDATION LOCATION: [facility] DATE: 6/26/2023 INDICATION: Right lower lobe lung nodule. Abnormal positron emission tomography (PET) of lung, Pulmonary nodules, Pulmonary emphysema, unspecified  emphysema type (H) TECHNIQUE: Dose reduction techniques were used. PROCEDURE: Informed consent obtained. Site marked. Prior images reviewed. Required items made available. Patient identity confirmed verbally and with arm band. Patient reevaluated immediately before administering sedation. Universal protocol was followed. Time out performed. The site was prepped and draped in sterile fashion. 10 mL of 1% lidocaine was infused into the local soft tissues. Using standard technique and under direct CT guidance, a 20-gauge biopsy device was used to obtain 5 core biopsies. Tissue was submitted to Pathology and was adequate by preliminary review by a pathologist. Image guided placement of fiducial marker into right lower lobe lung nodule with 2 fiducial markers placed. Good position. The patient tolerated the procedure well. No immediate complications. SEDATION: Versed 1.0 mg. Fentanyl 50 mcg. The procedure was performed with administration intravenous conscious sedation with appropriate preoperative, intraoperative, and postoperative evaluation. 25 minutes of supervised face to face conscious sedation time was provided by a radiology nurse under my direct supervision.     IMPRESSION: 1.  Successful CT-guided biopsy right lower lobe lung nodule. 2.  Successful CT-guided placement of fiducial markers into right lower lobe lung nodule. 3.  Conscious sedation. Reference CPT Codes: 37316, 15140, 11974, 81489    CT Lung Mediastinum Biopsy    Result Date: 6/26/2023  EXAM: 1. PERCUTANEOUS BIOPSY RIGHT LOWER LOBE LUNG NODULE. FIDUCIAL PLACEMENT INTO RIGHT LOWER LOBE LUNG NODULE. 2. CT GUIDANCE 3. CONSCIOUS SEDATION LOCATION: [facility] DATE: 6/26/2023 INDICATION: Right lower lobe lung nodule. Abnormal positron emission tomography (PET) of lung, Pulmonary nodules, Pulmonary emphysema, unspecified emphysema type (H) TECHNIQUE: Dose reduction techniques were used. PROCEDURE: Informed consent obtained. Site marked.  Prior images reviewed. Required items made available. Patient identity confirmed verbally and with arm band. Patient reevaluated immediately before administering sedation. Universal protocol was followed. Time out performed. The site was prepped and draped in sterile fashion. 10 mL of 1% lidocaine was infused into the local soft tissues. Using standard technique and under direct CT guidance, a 20-gauge biopsy device was used to obtain 5 core biopsies. Tissue was submitted to Pathology and was adequate by preliminary review by a pathologist. Image guided placement of fiducial marker into right lower lobe lung nodule with 2 fiducial markers placed. Good position. The patient tolerated the procedure well. No immediate complications. SEDATION: Versed 1.0 mg. Fentanyl 50 mcg. The procedure was performed with administration intravenous conscious sedation with appropriate preoperative, intraoperative, and postoperative evaluation. 25 minutes of supervised face to face conscious sedation time was provided by a radiology nurse under my direct supervision.     IMPRESSION: 1.  Successful CT-guided biopsy right lower lobe lung nodule. 2.  Successful CT-guided placement of fiducial markers into right lower lobe lung nodule. 3.  Conscious sedation. Reference CPT Codes: 10394, 14118, 70630, 19995    PET Oncology (Eyes to Thighs)    Result Date: 6/6/2023  EXAM: PET ONCOLOGY (EYES TO THIGHS) LOCATION: [facility] DATE/TIME: 6/5/2023 2:49 PM CDT INDICATION: Other nonspecific abnormal finding of lung field, multiple pulmonary nodules COMPARISON: Thoracic CT dated 05/16/2023 CONTRAST: None TECHNIQUE: Serum glucose level 92 mg/dL. One hour post intravenous administration of 10.2 mCi F-18 FDG, PET imaging was performed from the skull vertex to mid thighs, utilizing attenuation correction with concurrent axial CT and PET/CT image fusion. Dose  reduction techniques were used. PET/CT FINDINGS: Mildly FDG avid  spiculated nodule in the right upper lobe measuring 0.7 x 0.5 cm (max SUV 2.7), spiculated nodule in the right lower lobe measuring 0.7 x 0.7 cm (max SUV 2.9), and mildly FDG avid nodularity with associated cystic change in the left lung apex measuring 1.3 x 0.8 cm (max SUV 1.5) suspicious for early synchronous primary lung neoplasms of low metabolic activity such as adenocarcinomas. Focal FDG uptake involving the right anterior fourth rib with associated fracture deformity (max SUV 3.9) likely representing benign post traumatic inflammatory change. Diffuse esophagitis. Shoulder synovitis. CT FINDINGS: Mild senescent intracranial changes. Postoperative change of the right lens. Mild to moderate emphysema. Breast implants with in situ linear densities suggesting sequelae of prior intracapsular rupture. No significant coronary artery calcium. Sigmoid diverticulosis. Pelvic phleboliths. Multilevel degenerative changes of the spine.     IMPRESSION: Findings suspicious for early lung adenocarcinomas in the right upper lobe, right lower lobe, and left lung apex without metastasis.      Pathology: Right lower lobe    [facility]  [facility]  [address]  [place] [address]  P: [phone]  F: [phone]  Client MRN:  Patient Room/Bed Number:  Submitter:  Client ID:  Report To: Laboratory Report Status:  Gayla Tavera [id number]  F, 1955  Surgical Pathology Report (Final result) [id number]  Authorizing Provider: Sayra Dumont MD Ordering Provider: Sayra Dumont MD  Ordering Location: [facility] CT  Collected: 06/26/2023 10:41 AM  Pathologist: Pauline Yost MD Received: 06/26/2023 10:55 AM  .  Specimens  A Lung, Right  .  .  Final Diagnosis  LUNG NODULE, RIGHT LOWER LOBE, CT-GUIDED CORE BIOPSIES:  - NON-SMALL CELL CARCINOMA, FAVOR WELL DIFFERENTIATED ADENOCARCINOMA, LEPIDIC PREDOMINANT  TYPE  a. CONSISTENT WITH PULMONARY PRIMARY  SOURCE  b. PD-L1: LOW EXPRESSION  Electronically signed by Pauline Yost MD on 6/27/2023 at 11:24 AM            Objective:          PHYSICAL EXAMINATION:    /71 (BP Location: Left arm, Patient Position: Sitting)   Pulse 74   Temp 98.6  F (37  C)   Resp 16   Wt 57.1 kg (125 lb 12.8 oz)   SpO2 99%   BMI 23.01 kg/m      Gen: Alert, in NAD  Eyes: PER sclera anicteric  HENT: NC/AT  Neck: Supple  Pulm: No increased work of breathing, speaks in complete sentences, clear to auscultation   CV: Well-perfused, no cyanosis   Musculoskeletal: No MSK defects noted.  Skin: Normal color and turgor  Neurologic: Nonfocal  Psychiatric: Appropriate mood and affect    Intent of Therapy: Curative  Side effects that may occur during or within weeks after Radiation Therapy      Fatigue and general weakness     Darkening, irritation, itchiness, redness, dryness and peeling of the skin of the chest    Loss of chest and armpit hair    Painful swallowing limiting solid and liquid intake and causing dehydration    Nausea, vomiting and decrease in appetite    Side effects that may occur months or years after Radiation Therapy       Development of another tumor or cancer    Lung inflammation or fibrosis causing cough, fever, and shortness of breath     Fracture of ribs    Permanent narrowing or obstruction of the esophagus    Fluid compressing the lung (pleural effusion)    Coronary artery blockage causing angina pain or a heart attack    Thickening, telangiectasias (development of spider like blood vessels in the skin) and ulceration of the skin of the chest    Poor healing after a trauma or surgery in the irradiated areas    Nerve damage resulting in loss of strength or sensation    The risks, benefits and alternatives to radiation therapy were outlined with the patient. All questions were answered and a consent was signed.                          Impression   (C34.11) Malignant neoplasm of upper lobe of right lung (H)  (C34.31)  Malignant neoplasm of lower lobe of right lung (H)          Assessment & Plan:   69 yo with three  PET avid pulmonary lung nodules. The RLL nodule is biopsy positive for lepidic adenocarcinoma (fiducial placed), RUL less avid not biopsied, and ANTONI least PET avid.     IN discussion with Dr Dumont, the plan is to treat the two in right lung and follow the left lung nodule.  The RLL nodule being close to the diaphragm requires a fiducial to track movement, but the RUL may not move enough to require fiducial.  At time of simulation we will see if the RUL lesion is visible enough for treatment without a fiducial. If visibility an issue will treat RLL and get fiducial in RUL and treat sequentially.     Multiple cancers in family. Genetic testing done 7/26/2022 (expanded panel) and was negative. .     Quit tobacco but still vapes multiple times a day. Cessation discussed.      Gayla's daughter was on phone throughout encounter.       Again, thank you very much for the referral and allowing me to participate in the care of this patient.  If you have any questions or concerns about this consultation, please do not hesitate to call. 60 minutes was spent to review chart, personally review pathology, personally review serial images with patient, visit, radiation planning, documentation.       Sincerely,      Guillermina Lynne MD  Department of Radiation Oncology   [facility] Radiation Oncology  Tel: [phone]  Page: [phone]    [facility]  [address] [place] [address]     [place] [id number]   [place] [address]    CC:  Patient Care Team:  Mari Nuñez DO as PCP - General (Family Medicine)  Mari Nuñez DO as Assigned PCP  Gale Osullivan AuD as Audiologist (Audiology)  Cody Good MD as MD (Otolaryngology)  Cody Good MD as MD (Otolaryngology)  Cody Good MD as Assigned Surgical Provider  Sayra Dumont MD as Assigned Pulmonology  "Provider        Oncology Rooming Note    June 27, 2023 1:17 PM   Gayla Tavera is a 68 year old female who presents for:    Chief Complaint   Patient presents with     Oncology Clinic Visit     Lung Cancer     New consult     Initial Vitals: /71 (BP Location: Left arm, Patient Position: Sitting)   Pulse 74   Temp 98.6  F (37  C)   Resp 16   Wt 57.1 kg (125 lb 12.8 oz)   SpO2 99%   BMI 23.01 kg/m   Estimated body mass index is 23.01 kg/m  as calculated from the following:    Height as of 5/25/23: 1.575 m (5' 2\").    Weight as of this encounter: 57.1 kg (125 lb 12.8 oz). Body surface area is 1.58 meters squared.  No Pain (0) Comment: Data Unavailable   No LMP recorded. Patient is postmenopausal.  Allergies reviewed: Yes  Medications reviewed: Yes    Medications: Medication refills not needed today.  Pharmacy name entered into iMER: [facility] [id number] [place] [address]    Clinical concerns: New consult, wants to know type of lung cancer Dr. Lnyne was notified.     No previous radiation  No ICD/pacemaker  No pregnancy    Radiation Therapy Patient Education    Person involved with teaching: Patient    Patient educational needs for self management of treatment-related side effects assessment completed.  EPIC Patient Ed tab contains Patient Learning Assessment    Education Materials Given  Radiation Therapy and You, Skin Care During Radiation Treatment and Coping with Fatigue    Educational Topics Discussed  Side effects expected, Pain management, Skin care, Activity, Nutrition and weight loss and When to call MD/RN    Response To Teaching  More review necessary and Verbalizes understanding    GYN Only  Vaginal Dilator-given and educated: N/A    Referrals sent: None    Chemotherapy?  No          Melany David RN                Again, thank you for allowing me to participate in the care of your patient.        Sincerely,        Guillermina Lynne MD    "

## 2023-07-11 ENCOUNTER — APPOINTMENT (OUTPATIENT)
Dept: RADIATION ONCOLOGY | Facility: HOSPITAL | Age: 68
End: 2023-07-11
Attending: RADIOLOGY
Payer: COMMERCIAL

## 2023-07-11 PROCEDURE — 77334 RADIATION TREATMENT AID(S): CPT | Performed by: RADIOLOGY

## 2023-07-11 PROCEDURE — 77334 RADIATION TREATMENT AID(S): CPT | Mod: 26 | Performed by: RADIOLOGY

## 2023-07-18 ENCOUNTER — APPOINTMENT (OUTPATIENT)
Dept: RADIATION ONCOLOGY | Facility: HOSPITAL | Age: 68
End: 2023-07-18
Attending: RADIOLOGY
Payer: COMMERCIAL

## 2023-07-18 PROCEDURE — 77300 RADIATION THERAPY DOSE PLAN: CPT | Performed by: RADIOLOGY

## 2023-07-18 PROCEDURE — 77301 RADIOTHERAPY DOSE PLAN IMRT: CPT | Performed by: RADIOLOGY

## 2023-07-18 PROCEDURE — 77301 RADIOTHERAPY DOSE PLAN IMRT: CPT | Mod: 26 | Performed by: RADIOLOGY

## 2023-07-18 PROCEDURE — 77293 RESPIRATOR MOTION MGMT SIMUL: CPT | Mod: 26 | Performed by: RADIOLOGY

## 2023-07-18 PROCEDURE — 77338 DESIGN MLC DEVICE FOR IMRT: CPT | Mod: 26 | Performed by: RADIOLOGY

## 2023-07-18 PROCEDURE — 77300 RADIATION THERAPY DOSE PLAN: CPT | Mod: 26 | Performed by: RADIOLOGY

## 2023-07-18 PROCEDURE — 77338 DESIGN MLC DEVICE FOR IMRT: CPT | Performed by: RADIOLOGY

## 2023-07-18 PROCEDURE — 77293 RESPIRATOR MOTION MGMT SIMUL: CPT | Performed by: RADIOLOGY

## 2023-07-25 ENCOUNTER — APPOINTMENT (OUTPATIENT)
Dept: RADIATION ONCOLOGY | Facility: HOSPITAL | Age: 68
End: 2023-07-25
Attending: RADIOLOGY
Payer: COMMERCIAL

## 2023-07-25 VITALS
RESPIRATION RATE: 16 BRPM | HEART RATE: 69 BPM | DIASTOLIC BLOOD PRESSURE: 67 MMHG | TEMPERATURE: 98.1 F | OXYGEN SATURATION: 100 % | WEIGHT: 124 LBS | SYSTOLIC BLOOD PRESSURE: 136 MMHG | BODY MASS INDEX: 22.68 KG/M2

## 2023-07-25 DIAGNOSIS — C34.11 MALIGNANT NEOPLASM OF UPPER LOBE OF RIGHT LUNG (H): ICD-10-CM

## 2023-07-25 DIAGNOSIS — C34.31 MALIGNANT NEOPLASM OF LOWER LOBE OF RIGHT LUNG (H): Primary | ICD-10-CM

## 2023-07-25 PROCEDURE — 77370 RADIATION PHYSICS CONSULT: CPT | Performed by: RADIOLOGY

## 2023-07-25 PROCEDURE — 77373 STRTCTC BDY RAD THER TX DLVR: CPT | Performed by: RADIOLOGY

## 2023-07-25 ASSESSMENT — PAIN SCALES - GENERAL: PAINLEVEL: NO PAIN (0)

## 2023-07-25 NOTE — PROGRESS NOTES
SBRT/SRS OTV Note      Assessment / Impression     Malignant neoplasm of lower lobe of right lung (H) [C34.31]     Tolerating radiation therapy well.  All questions and concerns addressed.    Plan:   F/U 2 months with scan. May treat RUL lesion at that time with out fiducial.     Continue radiation treatment as prescribed.  Right lower lobe lung   SBRT Radiation: 1000cGy/5000cGy  Fractions 1/5  Date scheduled to complete 2023    Subjective:      HPI: Gayla Tavera is a 68 year old female with  Malignant neoplasm of lower lobe of right lung (H) [C34.31]    The following portions of the patient's history were reviewed and updated as appropriate: allergies, current medications, past family history, past medical history, past social history, past surgical history and problem list.    Assessment                 Body Site:  Thoracic Site: RLL  Stereotactic Radiosurgery: Yes  Stereotactic Radiosurgery date: 23  Concurrent Therapy: No  Today's Dose: 1000  Total Dose for Thoracic: 5000  Today's Fraction/Total Fraction Thoracic: 1/5  Voice Chances/Stridor/Larynx: 0: Normal  Pharynx and Esphogaus: 0: No change over baseline  Constipation: 0: None  Diarrhea W/O Colostomy: 0: None  Hemoptysis: 0: None  Dyspnea: 0: Normal                                   Sexuality Alteration                    Emotional Alteration    Copin: Effective  Comfort Alteration   KPS: 100 % Normal, no complaints  Fatigue (ONS scale): 0: No Fatigue  Pain Location: denies   Nutrition Alteration   Anorexia: 0: None  Nausea: 0: None  Vomitin: None  Weight: 56.2 kg (124 lb)  Pharynx and Esphogaus: 0: No change over baseline  Skin Alteration   Skin Sensation: 0: No problem  Skin Reaction: 0: None  AUA Assessment                                           Accompanied by        Objective:     Exam:   Vitals:    23 0859   BP: 136/67   Pulse: 69   Resp: 16   Temp: 98.1  F  (36.7  C)   SpO2: 100%   Weight: 56.2 kg (124 lb)   PainSc: No Pain (0)       Wt Readings from Last 8 Encounters:   07/25/23 56.2 kg (124 lb)   06/27/23 57.1 kg (125 lb 12.8 oz)   05/25/23 57.2 kg (126 lb)   07/14/22 56.2 kg (124 lb)   05/27/22 56.6 kg (124 lb 12.8 oz)   09/21/20 54.4 kg (120 lb)   11/27/19 54.9 kg (121 lb)   06/03/19 54.4 kg (120 lb)       General: Alert and oriented, in no acute distress  Gayla has no Erythema.    Treatment Summary to Date    Aria chart and setup information reviewed    Guillermina Lynne MD

## 2023-07-27 ENCOUNTER — APPOINTMENT (OUTPATIENT)
Dept: RADIATION ONCOLOGY | Facility: HOSPITAL | Age: 68
End: 2023-07-27
Attending: RADIOLOGY
Payer: COMMERCIAL

## 2023-07-27 PROCEDURE — 77373 STRTCTC BDY RAD THER TX DLVR: CPT | Performed by: RADIOLOGY

## 2023-07-31 ENCOUNTER — APPOINTMENT (OUTPATIENT)
Dept: RADIATION ONCOLOGY | Facility: HOSPITAL | Age: 68
End: 2023-07-31
Attending: RADIOLOGY
Payer: COMMERCIAL

## 2023-07-31 ENCOUNTER — TRANSFERRED RECORDS (OUTPATIENT)
Dept: MULTI SPECIALTY CLINIC | Facility: CLINIC | Age: 68
End: 2023-07-31

## 2023-07-31 LAB
ALT SERPL-CCNC: 19 U/L (ref 6–29)
AST SERPL-CCNC: 28 U/L (ref 10–35)
CREATININE (EXTERNAL): 0.76 MG/DL (ref 0.6–1)
GFR ESTIMATED (EXTERNAL): 84 ML/MIN/1.73M2
GLUCOSE (EXTERNAL): 104 MG/DL (ref 65–99)
INR (EXTERNAL): 1.1
POTASSIUM (EXTERNAL): 3.7 MMOL/L (ref 3.5–5.3)

## 2023-07-31 PROCEDURE — 77373 STRTCTC BDY RAD THER TX DLVR: CPT | Performed by: RADIOLOGY

## 2023-08-02 ENCOUNTER — APPOINTMENT (OUTPATIENT)
Dept: RADIATION ONCOLOGY | Facility: HOSPITAL | Age: 68
End: 2023-08-02
Attending: RADIOLOGY
Payer: COMMERCIAL

## 2023-08-02 PROCEDURE — 77373 STRTCTC BDY RAD THER TX DLVR: CPT | Performed by: STUDENT IN AN ORGANIZED HEALTH CARE EDUCATION/TRAINING PROGRAM

## 2023-08-04 ENCOUNTER — APPOINTMENT (OUTPATIENT)
Dept: RADIATION ONCOLOGY | Facility: HOSPITAL | Age: 68
End: 2023-08-04
Attending: RADIOLOGY
Payer: COMMERCIAL

## 2023-08-04 PROCEDURE — 77435 SBRT MANAGEMENT: CPT | Performed by: RADIOLOGY

## 2023-08-04 PROCEDURE — 77373 STRTCTC BDY RAD THER TX DLVR: CPT | Performed by: RADIOLOGY

## 2023-08-04 PROCEDURE — 77336 RADIATION PHYSICS CONSULT: CPT | Performed by: RADIOLOGY

## 2023-08-04 NOTE — PROGRESS NOTES
Pt ambulatory, completed Radiation therapy treatment. Discharge instruction reviewed with pt, and pt will set up follow up appointment

## 2023-08-11 ENCOUNTER — ANESTHESIA (OUTPATIENT)
Dept: SURGERY | Facility: HOSPITAL | Age: 68
End: 2023-08-11
Payer: COMMERCIAL

## 2023-08-11 ENCOUNTER — HOSPITAL ENCOUNTER (OUTPATIENT)
Facility: HOSPITAL | Age: 68
Discharge: HOME | End: 2023-08-11
Attending: INTERNAL MEDICINE | Admitting: INTERNAL MEDICINE
Payer: COMMERCIAL

## 2023-08-11 ENCOUNTER — ANESTHESIA EVENT (OUTPATIENT)
Dept: SURGERY | Facility: HOSPITAL | Age: 68
End: 2023-08-11
Payer: COMMERCIAL

## 2023-08-11 VITALS
RESPIRATION RATE: 20 BRPM | OXYGEN SATURATION: 97 % | HEART RATE: 64 BPM | BODY MASS INDEX: 22.41 KG/M2 | SYSTOLIC BLOOD PRESSURE: 100 MMHG | TEMPERATURE: 97.7 F | DIASTOLIC BLOOD PRESSURE: 64 MMHG | WEIGHT: 122.5 LBS

## 2023-08-11 LAB — UPPER EUS: NORMAL

## 2023-08-11 PROCEDURE — 250N000011 HC RX IP 250 OP 636: Performed by: NURSE ANESTHETIST, CERTIFIED REGISTERED

## 2023-08-11 PROCEDURE — 710N000012 HC RECOVERY PHASE 2, PER MINUTE: Performed by: INTERNAL MEDICINE

## 2023-08-11 PROCEDURE — 258N000003 HC RX IP 258 OP 636: Performed by: ANESTHESIOLOGY

## 2023-08-11 PROCEDURE — 250N000009 HC RX 250: Performed by: NURSE ANESTHETIST, CERTIFIED REGISTERED

## 2023-08-11 PROCEDURE — 250N000009 HC RX 250: Performed by: INTERNAL MEDICINE

## 2023-08-11 PROCEDURE — 999N000141 HC STATISTIC PRE-PROCEDURE NURSING ASSESSMENT: Performed by: INTERNAL MEDICINE

## 2023-08-11 PROCEDURE — 360N000076 HC SURGERY LEVEL 3, PER MIN: Performed by: INTERNAL MEDICINE

## 2023-08-11 PROCEDURE — 370N000017 HC ANESTHESIA TECHNICAL FEE, PER MIN: Performed by: INTERNAL MEDICINE

## 2023-08-11 PROCEDURE — 88305 TISSUE EXAM BY PATHOLOGIST: CPT | Mod: TC | Performed by: INTERNAL MEDICINE

## 2023-08-11 PROCEDURE — 272N000001 HC OR GENERAL SUPPLY STERILE: Performed by: INTERNAL MEDICINE

## 2023-08-11 RX ORDER — LIDOCAINE HYDROCHLORIDE 10 MG/ML
INJECTION, SOLUTION INFILTRATION; PERINEURAL PRN
Status: DISCONTINUED | OUTPATIENT
Start: 2023-08-11 | End: 2023-08-11

## 2023-08-11 RX ORDER — NALOXONE HYDROCHLORIDE 0.4 MG/ML
0.2 INJECTION, SOLUTION INTRAMUSCULAR; INTRAVENOUS; SUBCUTANEOUS
Status: DISCONTINUED | OUTPATIENT
Start: 2023-08-11 | End: 2023-08-11 | Stop reason: HOSPADM

## 2023-08-11 RX ORDER — ONDANSETRON 2 MG/ML
4 INJECTION INTRAMUSCULAR; INTRAVENOUS EVERY 6 HOURS PRN
Status: DISCONTINUED | OUTPATIENT
Start: 2023-08-11 | End: 2023-08-11 | Stop reason: HOSPADM

## 2023-08-11 RX ORDER — PROPOFOL 10 MG/ML
INJECTION, EMULSION INTRAVENOUS PRN
Status: DISCONTINUED | OUTPATIENT
Start: 2023-08-11 | End: 2023-08-11

## 2023-08-11 RX ORDER — FLUMAZENIL 0.1 MG/ML
0.2 INJECTION, SOLUTION INTRAVENOUS
Status: DISCONTINUED | OUTPATIENT
Start: 2023-08-11 | End: 2023-08-11 | Stop reason: HOSPADM

## 2023-08-11 RX ORDER — ONDANSETRON 2 MG/ML
INJECTION INTRAMUSCULAR; INTRAVENOUS PRN
Status: DISCONTINUED | OUTPATIENT
Start: 2023-08-11 | End: 2023-08-11

## 2023-08-11 RX ORDER — ONDANSETRON 4 MG/1
4 TABLET, ORALLY DISINTEGRATING ORAL EVERY 30 MIN PRN
Status: DISCONTINUED | OUTPATIENT
Start: 2023-08-11 | End: 2023-08-11 | Stop reason: HOSPADM

## 2023-08-11 RX ORDER — ONDANSETRON 4 MG/1
4 TABLET, ORALLY DISINTEGRATING ORAL EVERY 6 HOURS PRN
Status: DISCONTINUED | OUTPATIENT
Start: 2023-08-11 | End: 2023-08-11 | Stop reason: HOSPADM

## 2023-08-11 RX ORDER — LIDOCAINE 40 MG/G
CREAM TOPICAL
Status: DISCONTINUED | OUTPATIENT
Start: 2023-08-11 | End: 2023-08-11 | Stop reason: HOSPADM

## 2023-08-11 RX ORDER — OXYCODONE HYDROCHLORIDE 5 MG/1
10 TABLET ORAL
Status: DISCONTINUED | OUTPATIENT
Start: 2023-08-11 | End: 2023-08-11 | Stop reason: HOSPADM

## 2023-08-11 RX ORDER — PROPOFOL 10 MG/ML
INJECTION, EMULSION INTRAVENOUS CONTINUOUS PRN
Status: DISCONTINUED | OUTPATIENT
Start: 2023-08-11 | End: 2023-08-11

## 2023-08-11 RX ORDER — PROCHLORPERAZINE MALEATE 5 MG
5 TABLET ORAL EVERY 6 HOURS PRN
Status: DISCONTINUED | OUTPATIENT
Start: 2023-08-11 | End: 2023-08-11 | Stop reason: HOSPADM

## 2023-08-11 RX ORDER — SODIUM CHLORIDE, SODIUM LACTATE, POTASSIUM CHLORIDE, CALCIUM CHLORIDE 600; 310; 30; 20 MG/100ML; MG/100ML; MG/100ML; MG/100ML
INJECTION, SOLUTION INTRAVENOUS CONTINUOUS
Status: DISCONTINUED | OUTPATIENT
Start: 2023-08-11 | End: 2023-08-11 | Stop reason: HOSPADM

## 2023-08-11 RX ORDER — ACETAMINOPHEN 325 MG/1
975 TABLET ORAL
Status: DISCONTINUED | OUTPATIENT
Start: 2023-08-11 | End: 2023-08-11 | Stop reason: HOSPADM

## 2023-08-11 RX ORDER — OXYCODONE HYDROCHLORIDE 5 MG/1
5 TABLET ORAL
Status: DISCONTINUED | OUTPATIENT
Start: 2023-08-11 | End: 2023-08-11 | Stop reason: HOSPADM

## 2023-08-11 RX ORDER — ONDANSETRON 2 MG/ML
4 INJECTION INTRAMUSCULAR; INTRAVENOUS EVERY 30 MIN PRN
Status: DISCONTINUED | OUTPATIENT
Start: 2023-08-11 | End: 2023-08-11 | Stop reason: HOSPADM

## 2023-08-11 RX ORDER — NALOXONE HYDROCHLORIDE 0.4 MG/ML
0.4 INJECTION, SOLUTION INTRAMUSCULAR; INTRAVENOUS; SUBCUTANEOUS
Status: DISCONTINUED | OUTPATIENT
Start: 2023-08-11 | End: 2023-08-11 | Stop reason: HOSPADM

## 2023-08-11 RX ORDER — FENTANYL CITRATE 50 UG/ML
25 INJECTION, SOLUTION INTRAMUSCULAR; INTRAVENOUS
Status: DISCONTINUED | OUTPATIENT
Start: 2023-08-11 | End: 2023-08-11 | Stop reason: HOSPADM

## 2023-08-11 RX ORDER — GLYCOPYRROLATE 0.2 MG/ML
INJECTION, SOLUTION INTRAMUSCULAR; INTRAVENOUS PRN
Status: DISCONTINUED | OUTPATIENT
Start: 2023-08-11 | End: 2023-08-11

## 2023-08-11 RX ADMIN — PROPOFOL 40 MG: 10 INJECTION, EMULSION INTRAVENOUS at 12:41

## 2023-08-11 RX ADMIN — PROPOFOL 200 MCG/KG/MIN: 10 INJECTION, EMULSION INTRAVENOUS at 12:35

## 2023-08-11 RX ADMIN — PROPOFOL 40 MG: 10 INJECTION, EMULSION INTRAVENOUS at 12:35

## 2023-08-11 RX ADMIN — ONDANSETRON 4 MG: 2 INJECTION INTRAMUSCULAR; INTRAVENOUS at 12:35

## 2023-08-11 RX ADMIN — LIDOCAINE HYDROCHLORIDE 5 ML: 10 INJECTION, SOLUTION INFILTRATION; PERINEURAL at 12:35

## 2023-08-11 RX ADMIN — SODIUM CHLORIDE, POTASSIUM CHLORIDE, SODIUM LACTATE AND CALCIUM CHLORIDE: 600; 310; 30; 20 INJECTION, SOLUTION INTRAVENOUS at 12:30

## 2023-08-11 RX ADMIN — GLYCOPYRROLATE 0.2 MG: 0.2 INJECTION INTRAMUSCULAR; INTRAVENOUS at 12:35

## 2023-08-11 ASSESSMENT — ACTIVITIES OF DAILY LIVING (ADL): ADLS_ACUITY_SCORE: 35

## 2023-08-11 NOTE — ANESTHESIA CARE TRANSFER NOTE
Patient: Gayla Tavera    Procedure: Procedure(s):  ENDOSCOPIC ULTRASOUND UPPER TRACT, SUBCARINAL LYMPH NODE BIOPSIES       Diagnosis: Gastroesophageal reflux disease [K21.9]  Esophageal mass [K22.89]  Solitary pulmonary nodule [R91.1]  Diagnosis Additional Information: No value filed.    Anesthesia Type:   MAC     Note:    Oropharynx: oropharynx clear of all foreign objects  Level of Consciousness: awake  Oxygen Supplementation: room air    Independent Airway: airway patency satisfactory and stable  Dentition: dentition unchanged  Vital Signs Stable: post-procedure vital signs reviewed and stable  Report to RN Given: handoff report given  Patient transferred to: Phase II    Handoff Report: Identifed the Patient, Identified the Reponsible Provider, Reviewed the pertinent medical history, Discussed the surgical course, Reviewed Intra-OP anesthesia mangement and issues during anesthesia, Set expectations for post-procedure period and Allowed opportunity for questions and acknowledgement of understanding          Electronically Signed By: SNEHA Vera CRNA  August 11, 2023  1:10 PM

## 2023-08-11 NOTE — H&P
GENERAL PRE-PROCEDURE:   Procedure:  EUS - Esophageal Mass  Date/Time:  8/11/2023 11:16 AM    Verbal consent obtained?: Yes    Written consent obtained?: Yes    Risks and benefits: Risks, benefits and alternatives were discussed    Consent given by:  Patient  Patient states understanding of procedure being performed: Yes    Patient's understanding of procedure matches consent: Yes    Procedure consent matches procedure scheduled: Yes    Expected level of sedation:  Moderate  Appropriately NPO:  Yes  ASA Class:  3  Mallampati  :  Grade 2- soft palate, base of uvula, tonsillar pillars, and portion of posterior pharyngeal wall visible  Lungs:  Lungs clear with good breath sounds bilaterally  Heart:  Normal heart sounds and rate  History & Physical reviewed:  History and physical reviewed and no updates needed  Statement of review:  I have reviewed the lab findings, diagnostic data, medications, and the plan for sedation

## 2023-08-11 NOTE — ANESTHESIA PREPROCEDURE EVALUATION
Anesthesia Pre-Procedure Evaluation    Patient: Gayla Tavera   MRN: [id number] : 1955        Procedure : Procedure(s):  ENDOSCOPIC ULTRASOUND UPPER TRACT          Past Medical History:   Diagnosis Date    Anemia     Esophageal mass     Gastroesophageal reflux disease     High triglycerides     Lung cancer (H)       No past surgical history on file.   No Known Allergies   Social History     Tobacco Use    Smoking status: Former     Packs/day: 1.00     Types: Other, Cigarettes     Quit date:      Years since quittin.6    Smokeless tobacco: Never    Tobacco comments:     vapes, no cigarettes   Substance Use Topics    Alcohol use: Yes     Comment: social       Wt Readings from Last 1 Encounters:   23 55.6 kg (122 lb 8 oz)        Anesthesia Evaluation            ROS/MED HX  ENT/Pulmonary:       Neurologic:       Cardiovascular:       METS/Exercise Tolerance:     Hematologic:       Musculoskeletal:       GI/Hepatic:     (+) GERD,                   Renal/Genitourinary:       Endo:       Psychiatric/Substance Use:       Infectious Disease:       Malignancy:       Other:            Physical Exam    Airway        Mallampati: II   TM distance: > 3 FB   Neck ROM: full   Mouth opening: > 3 cm    Respiratory Devices and Support         Dental       (+) Modest Abnormalities - crowns, retainers, 1 or 2 missing teeth      Cardiovascular          Rhythm and rate: regular and normal     Pulmonary           breath sounds clear to auscultation           OUTSIDE LABS:  CBC:   Lab Results   Component Value Date    WBC 5.5 2020    WBC 5.6 2019    HGB 10.2 (L) 2023    HGB 11.7 2020    HCT 36.3 2020    HCT 34.2 (L) 2019     2023     2020     BMP:   Lab Results   Component Value Date     2022     2019    POTASSIUM 3.9 2022    POTASSIUM 4.0 2019    CHLORIDE 110 (H) 2022    CHLORIDE 109 2019    CO2 24  05/27/2022    CO2 25 06/03/2019    BUN 17 05/27/2022    BUN 18 06/03/2019    CR 0.78 05/27/2022    CR 0.70 06/03/2019    GLC 92 06/05/2023     (H) 05/27/2022     COAGS:   Lab Results   Component Value Date    INR 1.13 06/26/2023     POC: No results found for: BGM, HCG, HCGS  HEPATIC: No results found for: ALBUMIN, PROTTOTAL, ALT, AST, GGT, ALKPHOS, BILITOTAL, BILIDIRECT, DEANNA  OTHER:   Lab Results   Component Value Date    A1C 4.6 06/03/2019    NILS 9.2 05/27/2022    TSH 1.14 06/03/2019       Anesthesia Plan    ASA Status:  2    NPO Status:  NPO Appropriate    Anesthesia Type: MAC.              Consents    Anesthesia Plan(s) and associated risks, benefits, and realistic alternatives discussed. Questions answered and patient/representative(s) expressed understanding.     - Discussed: Risks, Benefits and Alternatives for BOTH SEDATION and the PROCEDURE were discussed     - Discussed with:  Patient            Postoperative Care            Comments:                Aleshia Watkins MD

## 2023-08-11 NOTE — ANESTHESIA POSTPROCEDURE EVALUATION
Patient: Gayla Tavera    Procedure: Procedure(s):  ENDOSCOPIC ULTRASOUND UPPER TRACT, SUBCARINAL LYMPH NODE BIOPSIES       Anesthesia Type:  MAC    Note:  Disposition: Outpatient   Postop Pain Control: Uneventful            Sign Out: Well controlled pain   PONV: No   Neuro/Psych: Uneventful            Sign Out: Acceptable/Baseline neuro status   Airway/Respiratory:    CV/Hemodynamics: Uneventful            Sign Out: Acceptable CV status; No obvious hypovolemia; No obvious fluid overload   Other NRE:    DID A NON-ROUTINE EVENT OCCUR?            Last vitals:  Vitals Value Taken Time   /62 08/11/23 1345   Temp 36.5  C (97.7  F) 08/11/23 1315   Pulse 63 08/11/23 1356   Resp 20 08/11/23 1330   SpO2 80 % 08/11/23 1356   Vitals shown include unvalidated device data.    Electronically Signed By: Aleshia Watkins MD  August 11, 2023  1:58 PM

## 2023-08-14 ENCOUNTER — DOCUMENTATION ONLY (OUTPATIENT)
Dept: RADIATION ONCOLOGY | Facility: CLINIC | Age: 68
End: 2023-08-14
Payer: COMMERCIAL

## 2023-08-14 ENCOUNTER — TELEPHONE (OUTPATIENT)
Dept: RADIATION ONCOLOGY | Facility: HOSPITAL | Age: 68
End: 2023-08-14
Payer: COMMERCIAL

## 2023-08-14 NOTE — TELEPHONE ENCOUNTER
Pt called to check on s/p RT, no answer, LM to call with any further questions or concerns and was just a courtesy call to check in. Reminded pt of follow up appointment with CT prior.

## 2023-08-14 NOTE — PROGRESS NOTES
Radiation Treatment Summary          Patient: Gayla ORLANDO Firnstahl            MRN: [id number]           : 1955        Care Provider: Guillermina Lynne MD         Date of Service: Aug 14, 2023      HISTORY: Gayla was treated with SBRT radiation therapy for a biopsy positive non-small cell carcinoma (favoring well-differentiated adenocarcinoma lipidic predominant type) of the right lower lobe.    She has an additional right upper lobe lesion that will be treated subsequently.  An additional cystic left upper lobe lung lesion will be observed.    SITE TREATED: Right lower lobe lung  TOTAL DOSE: 5000 cGy  DOSE PER FRACTION: 1000 cGy  NUMBER OF FRACTIONS: 5  DATES  TREATED: 2023 to 2023  CONCURRENT CHEMOTHERAPY: No  ADJUVANT THERAPY: No    Patient tolerated the treatment without unexpected side effects.   Pain - none    PLAN: Discharge instructions were given and Gayla knows to call if questions/issues arise. Gayla will be seen in f/u in 3  months with a scan.    The RUL lung lesion will be treated next.  No biopsy or fiducial is necessary unless follow up scan shows marked progression.  The ANTONI lesion will be observed.           Signed by: Guillermina Lynne MD

## 2023-08-15 LAB
PATH REPORT.COMMENTS IMP SPEC: NORMAL
PATH REPORT.FINAL DX SPEC: NORMAL
PATH REPORT.GROSS SPEC: NORMAL
PATH REPORT.MICROSCOPIC SPEC OTHER STN: NORMAL
PATH REPORT.RELEVANT HX SPEC: NORMAL

## 2023-08-15 PROCEDURE — 88305 TISSUE EXAM BY PATHOLOGIST: CPT | Mod: 26 | Performed by: PATHOLOGY

## 2023-09-25 ENCOUNTER — HOSPITAL ENCOUNTER (OUTPATIENT)
Dept: CT IMAGING | Facility: HOSPITAL | Age: 68
Discharge: HOME | End: 2023-09-25
Attending: RADIOLOGY | Admitting: RADIOLOGY
Payer: COMMERCIAL

## 2023-09-25 DIAGNOSIS — C34.11 MALIGNANT NEOPLASM OF UPPER LOBE OF RIGHT LUNG (H): ICD-10-CM

## 2023-09-25 DIAGNOSIS — C34.31 MALIGNANT NEOPLASM OF LOWER LOBE OF RIGHT LUNG (H): ICD-10-CM

## 2023-09-25 PROCEDURE — 71250 CT THORAX DX C-: CPT

## 2023-09-29 PROBLEM — R91.1 NODULE OF UPPER LOBE OF RIGHT LUNG: Status: ACTIVE | Noted: 2023-06-26

## 2023-09-29 NOTE — PROGRESS NOTES
[facility] Radiation Oncology Follow Up     Patient: Gayla Tavera  MRN: [id number]  Date of Service: 10/03/2023       DISEASE TREATED:  Right lower lobe lung      TYPE OF RADIATION THERAPY ADMINISTERED:  SBRT 5000cGy/5 fractions      INTERVAL SINCE COMPLETION OF RADIATION THERAPY: Completed 8/4/2023      SUBJECTIVE:  Ms. Taevra is a 68 year old female who  was treated with SBRT radiation therapy for a biopsy positive non-small cell carcinoma (favoring well-differentiated adenocarcinoma lipidic predominant type) of the right lower lobe. Biopsy of station 7 LN negative      She has an additional right upper lobe lesion.  An additional cystic left upper lobe lung lesion is being followed .    Medications were reviewed and are up to date on EPIC.    The following portions of the patient's history were reviewed and updated as appropriate: allergies, current medications, past family history, past medical history, past social history, past surgical history and problem list.    Review of Systems:      General  Constitutional  Constitutional (WDL): Exceptions to WDL  Fatigue: Fatigue relieved by rest  EENT  Eye Disorders  Eye Disorder (WDL): All eye disorder elements are within defined limits  Ear Disorders  Ear Disorder (WDL): All ear disorder elements are within defined limits  Respiratory  Respiratory  Respiratory (WDL): Exceptions to WDL  Cough: Mild symptoms OR nonprescription intervention indicated  Dyspnea: Shortness of breath with moderate exertion  Cardiovascular  Cardiovascular  Cardiovascular (WDL): All cardiovascular elements are within defined limits  Gastrointestinal  Gastrointestinal  Gastrointestinal (WDL): All gastrointestinal elements are within defined limits  Musculoskeletal  Musculoskeletal and Connective Tissue Disorders  Musculoskeletal & Connective (WDL): All musculoskeletal & connective elements are within defined limits  Integumentary  Integumentary  Integumentary (WDL): All  integumentary elements are within defined limits  Neurological  Neurosensory  Neurosensory (WDL): All neurosensory elements are within defined limits  Genitourinary/Reproductive  Genitourinary  Genitourinary (WDL): All genitourinary elements are within defined limits  Lymphatic  Lymph System Disorders  Lymph (WDL): All lymph elements are within defined limits  Pain  Pain Score: No Pain (0)  AUA Assessment                                                              Accompanied by  Accompanied By: self only    Objective:          PHYSICAL EXAMINATION:    /78   Pulse 81   Temp 98.1  F (36.7  C)   Resp 18   Wt 55.8 kg (123 lb)   SpO2 97%   BMI 22.50 kg/m        Gen: Alert, in NAD  Eyes: PER sclera anicteric  HENT: NC/AT  Neck: Supple  Pulm: No increased work of breathing, speaks in complete sentences  CV: Well-perfused, no cyanosis   Musculoskeletal: No MSK defects noted.  Skin: Normal color and turgor  Neurologic: Nonfocal  Psychiatric: Appropriate mood and affect     Imaging: Imaging results 6 weeks:CT Chest w/o Contrast    Result Date: 9/26/2023  EXAM: CT CHEST W/O CONTRAST LOCATION: [facility] DATE: 9/25/2023 INDICATION: RLL lesion treated SBRT 7 2023. Evaluate RUL lesion for SBRT with fiducial marker. COMPARISON: PET/CT dated 06/05/2023, CT-guided biopsy of 06/26/2023. TECHNIQUE: CT chest without IV contrast. Multiplanar reformats were obtained. Dose reduction techniques were used. CONTRAST: None. FINDINGS: LUNGS AND PLEURA: Mild centrilobular emphysema with upper lobe predominance. There is a subsolid nodule adjacent to a bulla in the left apex on image 26 of series 4. The solid component measures 4 x 2 mm. The subsolid component measures approximately 8 x  13 mm in diameter. This is stable. There is a spiculated subsolid nodule in the right upper lobe on image 58 of series 4 measuring 7 mm in diameter. This also appears stable from the PET/CT. There is a nodule in the right  lower lobe on image 160 of series 4 measuring 7 mm in diameter with the associated fiducial marker. This shows some lucency at the center which may represent early cavitation and is unchanged in size from the PET/CT. No new or enlarging nodules or masses. Mild cylindrical bronchiectasis in the lower lobes. There is probable rounded atelectasis at the right medial lung base which has increased from the PET/CT. No consolidation, pneumothorax or pleural effusion. MEDIASTINUM/AXILLAE: No lymphadenopathy. No thoracic aortic aneurysm. No pericardial effusion. CORONARY ARTERY CALCIFICATION: None. UPPER ABDOMEN: No significant finding. MUSCULOSKELETAL: Interval healing of right fourth anterior rib fracture. No suspicious bony lesions. Breast implants.     IMPRESSION: 1.  No change in size of pulmonary nodules in the right lower lobe, right upper lobe and left apex. The right lower lobe nodule with associated fiducial markers shows a subtle early cavitation which may represent a posttreatment change. 2.  No new or enlarging nodules or masses. 3.  No lymphadenopathy.       Impression   (C34.11) Malignant neoplasm of upper lobe of right lung (H)  (primary encounter diagnosis)  Comment: Stable without treatment   Plan: Continue observation     (C34.31) Malignant neoplasm of lower lobe of right lung (H)  Comment: s/p SBRT 8/2/2023, scan shows treatment change.   Plan: scan 6  months     Stage I well differentiated adenocarcinoma of right lower lobe lung   Assessment & Plan:   67 yo with biopsy proven NSCLC of RLL with station 7 LN negative s/p SBRT 8/2023.     Left apex and right upper lung nodules remain stable    Scan and follow up 6 months      Total time of this visit, including time spent face-to-face with patient 30 minutes, and also in preparing for today's visit for MDM and documentation. Medical decision-making included consideration and possible diagnoses, management options, complex record review, review of diagnostic  tests and information, consideration and discussion of significant complications based on comorbidities, discussion with providers involved in the care of the patient.      Guillermina Lynne MD  Department of Radiation Oncology   [facility]  Tel: [phone]  Page: [phone]    [facility]  [address] [place] [address]     [facility]   [address]   [place] [address]    CC:  Patient Care Team:  Mari Nuñez DO as PCP - General (Family Medicine)  Mari Nuñez DO as Assigned PCP  Gale Osullivan AuD as Audiologist (Audiology)  Cody Good MD as MD (Otolaryngology)  Cody Good MD as MD (Otolaryngology)  Cody Good MD as Assigned Surgical Provider  Sayra Dumont MD as Assigned Pulmonology Provider  Guillermina Lynne MD as Assigned Cancer Care Provider

## 2023-10-03 ENCOUNTER — OFFICE VISIT (OUTPATIENT)
Dept: RADIATION ONCOLOGY | Facility: HOSPITAL | Age: 68
End: 2023-10-03
Attending: RADIOLOGY
Payer: COMMERCIAL

## 2023-10-03 VITALS
DIASTOLIC BLOOD PRESSURE: 78 MMHG | RESPIRATION RATE: 18 BRPM | OXYGEN SATURATION: 97 % | BODY MASS INDEX: 22.5 KG/M2 | SYSTOLIC BLOOD PRESSURE: 118 MMHG | WEIGHT: 123 LBS | TEMPERATURE: 98.1 F | HEART RATE: 81 BPM

## 2023-10-03 DIAGNOSIS — R91.1 NODULE OF UPPER LOBE OF RIGHT LUNG: Primary | ICD-10-CM

## 2023-10-03 DIAGNOSIS — C34.31 MALIGNANT NEOPLASM OF LOWER LOBE OF RIGHT LUNG (H): ICD-10-CM

## 2023-10-03 PROCEDURE — G0463 HOSPITAL OUTPT CLINIC VISIT: HCPCS | Performed by: RADIOLOGY

## 2023-10-03 PROCEDURE — 99024 POSTOP FOLLOW-UP VISIT: CPT | Performed by: RADIOLOGY

## 2023-10-03 RX ORDER — MULTIVIT WITH MINERALS/LUTEIN
1 TABLET ORAL DAILY
COMMUNITY

## 2023-10-03 RX ORDER — CALCIUM CARBONATE 500(1250)
1 TABLET ORAL 2 TIMES DAILY
COMMUNITY

## 2023-10-03 ASSESSMENT — PAIN SCALES - GENERAL: PAINLEVEL: NO PAIN (0)

## 2023-10-03 NOTE — LETTER
10/3/2023         RE: Gayla Tavera  [address] [facility] [address]        Dear Colleague,    Thank you for referring your patient, Gayla Tavera, to the [facility] RADIATION [facility]COLOGY [place]. Please see a copy of my visit note below.    [facility] Radiation Oncology Follow Up     Patient: Gayla Tavera  MRN: [id number]  Date of Service: 10/03/2023       DISEASE TREATED:  Right lower lobe lung      TYPE OF RADIATION THERAPY ADMINISTERED:  SBRT 5000cGy/5 fractions      INTERVAL SINCE COMPLETION OF RADIATION THERAPY: Completed 8/4/2023      SUBJECTIVE:  Ms. Tavera is a 68 year old female who  was treated with SBRT radiation therapy for a biopsy positive non-small cell carcinoma (favoring well-differentiated adenocarcinoma lipidic predominant type) of the right lower lobe. Biopsy of station 7 LN negative      She has an additional right upper lobe lesion.  An additional cystic left upper lobe lung lesion is being followed .    Medications were reviewed and are up to date on EPIC.    The following portions of the patient's history were reviewed and updated as appropriate: allergies, current medications, past family history, past medical history, past social history, past surgical history and problem list.    Review of Systems:      General  Constitutional  Constitutional (WDL): Exceptions to WDL  Fatigue: Fatigue relieved by rest  EENT  Eye Disorders  Eye Disorder (WDL): All eye disorder elements are within defined limits  Ear Disorders  Ear Disorder (WDL): All ear disorder elements are within defined limits  Respiratory  Respiratory  Respiratory (WDL): Exceptions to WDL  Cough: Mild symptoms OR nonprescription intervention indicated  Dyspnea: Shortness of breath with moderate exertion  Cardiovascular  Cardiovascular  Cardiovascular (WDL): All cardiovascular elements are within defined limits  Gastrointestinal  Gastrointestinal  Gastrointestinal (WDL): All gastrointestinal  elements are within defined limits  Musculoskeletal  Musculoskeletal and Connective Tissue Disorders  Musculoskeletal & Connective (WDL): All musculoskeletal & connective elements are within defined limits  Integumentary  Integumentary  Integumentary (WDL): All integumentary elements are within defined limits  Neurological  Neurosensory  Neurosensory (WDL): All neurosensory elements are within defined limits  Genitourinary/Reproductive  Genitourinary  Genitourinary (WDL): All genitourinary elements are within defined limits  Lymphatic  Lymph System Disorders  Lymph (WDL): All lymph elements are within defined limits  Pain  Pain Score: No Pain (0)  AUA Assessment                                                              Accompanied by  Accompanied By: self only    Objective:          PHYSICAL EXAMINATION:    /78   Pulse 81   Temp 98.1  F (36.7  C)   Resp 18   Wt 55.8 kg (123 lb)   SpO2 97%   BMI 22.50 kg/m        Gen: Alert, in NAD  Eyes: PER sclera anicteric  HENT: NC/AT  Neck: Supple  Pulm: No increased work of breathing, speaks in complete sentences  CV: Well-perfused, no cyanosis   Musculoskeletal: No MSK defects noted.  Skin: Normal color and turgor  Neurologic: Nonfocal  Psychiatric: Appropriate mood and affect     Imaging: Imaging results 6 weeks:CT Chest w/o Contrast    Result Date: 9/26/2023  EXAM: CT CHEST W/O CONTRAST LOCATION: [facility] DATE: 9/25/2023 INDICATION: RLL lesion treated SBRT 7 2023. Evaluate RUL lesion for SBRT with fiducial marker. COMPARISON: PET/CT dated 06/05/2023, CT-guided biopsy of 06/26/2023. TECHNIQUE: CT chest without IV contrast. Multiplanar reformats were obtained. Dose reduction techniques were used. CONTRAST: None. FINDINGS: LUNGS AND PLEURA: Mild centrilobular emphysema with upper lobe predominance. There is a subsolid nodule adjacent to a bulla in the left apex on image 26 of series 4. The solid component measures 4 x 2 mm. The subsolid  component measures approximately 8 x  13 mm in diameter. This is stable. There is a spiculated subsolid nodule in the right upper lobe on image 58 of series 4 measuring 7 mm in diameter. This also appears stable from the PET/CT. There is a nodule in the right lower lobe on image 160 of series 4 measuring 7 mm in diameter with the associated fiducial marker. This shows some lucency at the center which may represent early cavitation and is unchanged in size from the PET/CT. No new or enlarging nodules or masses. Mild cylindrical bronchiectasis in the lower lobes. There is probable rounded atelectasis at the right medial lung base which has increased from the PET/CT. No consolidation, pneumothorax or pleural effusion. MEDIASTINUM/AXILLAE: No lymphadenopathy. No thoracic aortic aneurysm. No pericardial effusion. CORONARY ARTERY CALCIFICATION: None. UPPER ABDOMEN: No significant finding. MUSCULOSKELETAL: Interval healing of right fourth anterior rib fracture. No suspicious bony lesions. Breast implants.     IMPRESSION: 1.  No change in size of pulmonary nodules in the right lower lobe, right upper lobe and left apex. The right lower lobe nodule with associated fiducial markers shows a subtle early cavitation which may represent a posttreatment change. 2.  No new or enlarging nodules or masses. 3.  No lymphadenopathy.       Impression   (C34.11) Malignant neoplasm of upper lobe of right lung (H)  (primary encounter diagnosis)  Comment: Stable without treatment   Plan: Continue observation     (C34.31) Malignant neoplasm of lower lobe of right lung (H)  Comment: s/p SBRT 8/2/2023, scan shows treatment change.   Plan: scan 6  months     Stage I well differentiated adenocarcinoma of right lower lobe lung   Assessment & Plan:   67 yo with biopsy proven NSCLC of RLL with station 7 LN negative s/p SBRT 8/2023.     Left apex and right upper lung nodules remain stable    Scan and follow up 6 months      Total time of this visit,  "including time spent face-to-face with patient 30 minutes, and also in preparing for today's visit for MDM and documentation. Medical decision-making included consideration and possible diagnoses, management options, complex record review, review of diagnostic tests and information, consideration and discussion of significant complications based on comorbidities, discussion with providers involved in the care of the patient.      Guillermina Lynne MD  Department of Radiation Oncology   [facility]  Tel: [phone]  Page: [phone]    [facility]  [address] [place] [address]     [facility]   [address] [place] [address]    CC:  Patient Care Team:  Mari Nuñez DO as PCP - General (Family Medicine)  Mari Nuñez DO as Assigned PCP  Gale Osullivan AuD as Audiologist (Audiology)  Cody Good MD as MD (Otolaryngology)  Cody Good MD as MD (Otolaryngology)  Cody Good MD as Assigned Surgical Provider  Sayra Dumont MD as Assigned Pulmonology Provider  Guillermina Lynne MD as Assigned Cancer Care Provider      Oncology Rooming Note    October 3, 2023 9:11 AM   Gayla Tavera is a 68 year old female who presents for:    Chief Complaint   Patient presents with     Oncology Clinic Visit     Rad Onc follow up     Initial Vitals: /78   Pulse 81   Temp 98.1  F (36.7  C)   Resp 18   Wt 55.8 kg (123 lb)   SpO2 97%   BMI 22.50 kg/m   Estimated body mass index is 22.5 kg/m  as calculated from the following:    Height as of 5/25/23: 1.575 m (5' 2\").    Weight as of this encounter: 55.8 kg (123 lb). Body surface area is 1.56 meters squared.  No Pain (0) Comment: Data Unavailable   No LMP recorded. Patient is postmenopausal.  Allergies reviewed: Yes  Medications reviewed: Yes    Medications: Medication refills not needed today.  Pharmacy [facility] entered into [id number] [place] [facility] [address]    Clinical " concerns: Mild SOB and cough, otherwise feeling okay.  Dr. Lynne was notified.       Shirin Boogie RN                Again, thank you for allowing me to participate in the care of your patient.        Sincerely,        Guillermina Lynne MD

## 2023-10-03 NOTE — PROGRESS NOTES
"Oncology Rooming Note    October 3, 2023 9:11 AM   Gayla Tavera is a 68 year old female who presents for:    Chief Complaint   Patient presents with    Oncology Clinic Visit     Rad Onc follow up     Initial Vitals: /78   Pulse 81   Temp 98.1  F (36.7  C)   Resp 18   Wt 55.8 kg (123 lb)   SpO2 97%   BMI 22.50 kg/m   Estimated body mass index is 22.5 kg/m  as calculated from the following:    Height as of 5/25/23: 1.575 m (5' 2\").    Weight as of this encounter: 55.8 kg (123 lb). Body surface area is 1.56 meters squared.  No Pain (0) Comment: Data Unavailable   No LMP recorded. Patient is postmenopausal.  Allergies reviewed: Yes  Medications reviewed: Yes    Medications: Medication refills not needed today.  Pharmacy name entered into [employer]: [id number] [place] [address]    Clinical concerns: Mild SOB and cough, otherwise feeling okay.  Dr. Lynne was notified.       Shirin Boogie RN              "

## 2024-03-18 ENCOUNTER — HOSPITAL ENCOUNTER (OUTPATIENT)
Dept: CT IMAGING | Facility: HOSPITAL | Age: 69
Discharge: HOME | End: 2024-03-18
Attending: RADIOLOGY | Admitting: RADIOLOGY
Payer: COMMERCIAL

## 2024-03-18 DIAGNOSIS — C34.31 MALIGNANT NEOPLASM OF LOWER LOBE OF RIGHT LUNG (H): ICD-10-CM

## 2024-03-18 DIAGNOSIS — R91.1 NODULE OF UPPER LOBE OF RIGHT LUNG: ICD-10-CM

## 2024-03-18 PROCEDURE — 71250 CT THORAX DX C-: CPT

## 2024-03-22 ENCOUNTER — TELEPHONE (OUTPATIENT)
Dept: RADIATION ONCOLOGY | Facility: HOSPITAL | Age: 69
End: 2024-03-22
Payer: COMMERCIAL

## 2024-03-22 DIAGNOSIS — R91.1 LUNG NODULE: Primary | ICD-10-CM

## 2024-03-22 DIAGNOSIS — C34.31 MALIGNANT NEOPLASM OF LOWER LOBE BRONCHUS, RIGHT (H): ICD-10-CM

## 2024-03-22 NOTE — TELEPHONE ENCOUNTER
Review of scan shows general stability but there may be a new 3mm subsolid component along edge. Radiology recommends 6 month CT. Plan discussed with patient.     Per radiology repeat in 6 months. Patient called and aware of findings and plan.

## 2024-04-30 ENCOUNTER — TELEPHONE (OUTPATIENT)
Dept: FAMILY MEDICINE | Facility: CLINIC | Age: 69
End: 2024-04-30
Payer: COMMERCIAL

## 2024-04-30 NOTE — LETTER
May 7, 2024      Gayla Tavera  [address] [facility] [address]      Your healthcare team cares about your health. To provide you with the best care, we have reviewed your chart and based on our findings, we see that you are due to:     PREVENTATIVE VISIT: Annual Medicare Wellness: Schedule an Annual Medicare Wellness Exam. Please call your [facility] to set up your appointment.    If you have already completed these items, please contact the clinic via phone or Mychart so your care team can review and update your records.  Thank you for choosing [facility] for your healthcare needs. For any questions, concerns, or to schedule an appointment please contact the clinic.     Healthy Regards,    Your [facility] Care Team

## 2024-04-30 NOTE — TELEPHONE ENCOUNTER
Patient Quality Outreach    Patient is due for the following:   Physical Annual Wellness Visit    Next Steps:   Schedule a Annual Wellness Visit    Type of outreach:    Phone, left message for patient/parent to call back.      Questions for provider review:    None           Ailyn No CMA

## 2024-08-12 ENCOUNTER — TELEPHONE (OUTPATIENT)
Dept: FAMILY MEDICINE | Facility: CLINIC | Age: 69
End: 2024-08-12
Payer: COMMERCIAL

## 2024-08-12 NOTE — TELEPHONE ENCOUNTER
Patient Quality Outreach    Patient is due for the following:   Physical Annual Wellness Visit    Next Steps:   Schedule a Annual Wellness Visit    Type of outreach:    Phone, spoke to patient/parent. Patient stated she is no longer with [facility] and has transferred care. If in the future she decides to come back, she will call us to schedule appointment. She did not specify which clinic she has transferred to      Questions for provider review:    None           Ailyn No, [facility]

## 2024-08-26 ENCOUNTER — HOSPITAL ENCOUNTER (OUTPATIENT)
Dept: CT IMAGING | Facility: HOSPITAL | Age: 69
Discharge: HOME | End: 2024-08-26
Attending: RADIOLOGY | Admitting: RADIOLOGY
Payer: COMMERCIAL

## 2024-08-26 DIAGNOSIS — C34.31 MALIGNANT NEOPLASM OF LOWER LOBE BRONCHUS, RIGHT (H): ICD-10-CM

## 2024-08-26 DIAGNOSIS — R91.1 LUNG NODULE: ICD-10-CM

## 2024-08-26 PROCEDURE — 71250 CT THORAX DX C-: CPT

## 2024-09-11 ENCOUNTER — OFFICE VISIT (OUTPATIENT)
Dept: RADIATION ONCOLOGY | Facility: HOSPITAL | Age: 69
End: 2024-09-11
Attending: RADIOLOGY
Payer: COMMERCIAL

## 2024-09-11 VITALS
DIASTOLIC BLOOD PRESSURE: 77 MMHG | OXYGEN SATURATION: 98 % | BODY MASS INDEX: 23.06 KG/M2 | HEART RATE: 74 BPM | RESPIRATION RATE: 16 BRPM | WEIGHT: 126.1 LBS | TEMPERATURE: 98.3 F | SYSTOLIC BLOOD PRESSURE: 116 MMHG

## 2024-09-11 DIAGNOSIS — R94.2 ABNORMAL POSITRON EMISSION TOMOGRAPHY (PET) OF LUNG: ICD-10-CM

## 2024-09-11 DIAGNOSIS — C34.31 MALIGNANT NEOPLASM OF LOWER LOBE OF RIGHT LUNG (H): ICD-10-CM

## 2024-09-11 DIAGNOSIS — C34.31 MALIGNANT NEOPLASM OF LOWER LOBE BRONCHUS, RIGHT (H): Primary | ICD-10-CM

## 2024-09-11 DIAGNOSIS — R91.8 PULMONARY NODULES: ICD-10-CM

## 2024-09-11 PROCEDURE — G0463 HOSPITAL OUTPT CLINIC VISIT: HCPCS | Performed by: RADIOLOGY

## 2024-09-11 PROCEDURE — 99203 OFFICE O/P NEW LOW 30 MIN: CPT | Performed by: RADIOLOGY

## 2024-09-11 RX ORDER — FAMOTIDINE 20 MG/1
1 TABLET, FILM COATED ORAL
COMMUNITY
Start: 2024-02-27

## 2024-09-11 RX ORDER — PANTOPRAZOLE SODIUM 40 MG/1
TABLET, DELAYED RELEASE ORAL EVERY MORNING
COMMUNITY
Start: 2024-06-25

## 2024-09-11 RX ORDER — ALENDRONATE SODIUM 35 MG/1
TABLET ORAL
COMMUNITY
Start: 2024-08-20

## 2024-09-11 ASSESSMENT — PAIN SCALES - GENERAL: PAINLEVEL: NO PAIN (0)

## 2024-09-11 NOTE — PROGRESS NOTES
[facility] Radiation Oncology Follow Up     Patient: Gayla Tavera  MRN: [id number]  Date of Service: 09/11/2024       DISEASE TREATED:  T1N0MO RLL NSCLC      TYPE OF RADIATION THERAPY ADMINISTERED:  SBRT 5000cGy/5 fractions Completed 8/4/2023      INTERVAL SINCE COMPLETION OF RADIATION THERAPY: 13 months      SUBJECTIVE:  Ms. Tavera is a 69 year old female who was treated with SBRT radiation therapy for a biopsy positive non-small cell carcinoma (favoring well-differentiated adenocarcinoma lipidic predominant type) of the right lower lobe. Biopsy of station 7 LN negative     Medications were reviewed and are up to date on EPIC.    The following portions of the patient's history were reviewed and updated as appropriate: allergies, current medications, past family history, past medical history, past social history, past surgical history and problem list.    Review of Systems:   She denies problems concerns or difficulties with therapy.  Denies SOB  cough wheezing or hemoptysis. Patient reports good activity, walking  twice daily and performing all activities of daily living  Other ten point ros essentially negative.    General     EENT     Respiratory     Cardiovascular     Gastrointestinal     Musculoskeletal     Integumentary     Neurological     Genitourinary/Reproductive     Lymphatic     Pain     AUA Assessment                                                              Accompanied by       Objective:          PHYSICAL EXAMINATION:  Performed with onsite female nurse Melany present.  Vitals as noted in EMR.    Gen: Alert, in NAD  Eyes: PERRL, EOMI, sclera anicteric  HENT     Head: NC/AT     Ears: No external auricular lesions     Nose/sinus: No rhinorrhea or epistaxis     Oropharynx: MMM, no visible oral lesions  Neck: Supple, full ROM, no LAD  Pulm: No wheezing, stridor or respiratory distress. Symetric rises of chestwall bilaterally.  CV: Well-perfused, no cyanosis, no pedal edema  Skin: Normal  color and turgor with mild hyperpigmentation within XRT field  Neurologic: A/Ox3, CN II-XII intact, normal gait and station  Psychiatric: Appropriate mood and affect     Imaging: Imaging results 30 days: CT Chest w/o Contrast    Result Date: 8/27/2024  EXAM: CT CHEST W/O CONTRAST LOCATION: [facility] DATE: 8/26/2024 INDICATION: Decrease in size right lower lobe known lung cancer with fiducial marker with some slight increased surrounding posttreatment infiltrates.   2.  The 7 mm spiculated lesion is stable in size but suggestive of a new 3 mm solid component along the margin. COMPARISON: Chest CT on 3/18/2024 TECHNIQUE: CT chest without IV contrast. Multiplanar reformats were obtained. Dose reduction techniques were used. CONTRAST: None. FINDINGS: LUNGS AND PLEURA: No pleural effusion or pneumothorax. Mild upper lobes predominant emphysematous changes. There is 0.9 x 1.2 cm nodular opacity in the superior segment right lower lobe, just above the fiducial markers, increased in size as compared to 3  2024 exam when it measured 1.0 x 0.5 cm. Patchy consolidative pulmonary opacities in the superior segment right lower lobe near the fiducial markers, new/increased as compared to 3/18/2024, could represent posttreatment changes is less likely infection or atelectasis. Slightly more inferior there is patchy consolidative subpleural pulmonary opacity not significantly changed as compared to 3/18/2024 exam. Few other smaller nodular pulmonary opacities in the upper lobes, for example approximately 8 mm spiculated nodular opacity in the right upper lobe (series 4 image 79) and 7 mm nodular opacity in the medial aspect of the left lung apex (series 4 image 45), not significantly changed as compared to 3/18/2024. MEDIASTINUM/AXILLAE: No cardiomegaly or significant pericardial effusion. No significant mediastinal or hilar lymphadenopathy. Bilateral likely ruptured breast implants. CORONARY ARTERY  CALCIFICATION: None. UPPER ABDOMEN: Limited evaluation of the upper abdomen due to lack of coverage and contrast. Diffuse hypodense appearance of the liver, likely due to underlying hepatic steatosis. MUSCULOSKELETAL: Multilevel degenerative changes of the spine. No suspicious osseous lesion.     IMPRESSION: 1.  There is approximately 1.2 x 0.9 cm nodular opacity in the superior segment of the right lower lobe just above the fiducial markers, increased in size as compared to 3/18/2024 exam when it measured approximately 1.0 x 0.5 cm, worrisome for enlarging neoplastic nodule. 2.  There is patchy consolidative pulmonary opacities along the lateral and inferior aspects of the fiducial markers, new/increased as compared to 3/18/2024 exam, indeterminate, could represent posttreatment changes versus less likely infectious or atelectatic. Slightly more inferior, there is posterior right lower lobe subpleural pulmonary opacities, not significantly changed as compared to 3/18/2024 exam. 3.  A few other scattered nodular pulmonary opacities, for example 8 mm spiculated nodular opacity in the right upper lobe and 7 mm nodular opacity in the medial aspect of the left lung apex, not significantly changed as compared to 3/18/2024. 4.  Upper lobes predominant emphysematous changes. 5.  Hepatic steatosis.    EXAM: CT CHEST W/O CONTRAST  LOCATION: [facility]  DATE: 8/26/2024         Visit Dx:    (C34.31) Malignant neoplasm of lower lobe bronchus, right (H)  (primary encounter diagnosis)    (R91.8) Pulmonary nodules    (R94.2) Abnormal positron emission tomography (PET) of lung    (C34.31) Malignant neoplasm of lower lobe of right lung (H)      Cancer Staging   Malignant neoplasm of lower lobe of right lung (H)  Staging form: Lung, AJCC 8th Edition  - Clinical stage from 11/9/2023: cT1, cN0, cM0 - Signed by Guillermina Lynne MD on 11/9/2023      Assessment & Plan:   1. Patient is stable with no  reported concerns or side effects to address from SBRT.  2. Abnormality on CT reviewed.  Patient declined reviewing imaging.  New area within prior XRT field and 90% isodose line.  Imaging and treatment plan reviewed and compared side by side. New area adjacent to marker most likely expected radiation fibrosis.  Will order PET to distinguished from radiation fibrosis vs potential small reoccurrence.  If PET SUV less than 5, would favor continued surveillance.  Will await results and make recommendations accordingly.  If PET suggestive of reoccurrence, will obtain Pulmonary Consult.  Area of concern easily amenable to second course of SBRT for cure if needed.  3. Patient reassured of above.  All questions were answered.  4. Patient was happy with plan and visit today.  6. If Negative PET, then follow up 6 months with repeat CT of chest..      Pain Management Plan: Not required or needed.    Face to face time  30 minutes with > 80% spent on consultation, education and coordination of care.    DO JUAN J Chavez  Department of Radiation Oncology   [facility] Radiation Oncology  Tel: [phone]  Page: [phone]    [facility]  [address] [place] [address]     [place] [id number] [facility] [place] [address]    CC:  Patient Care Team:  Clary Johnston APRN CNP as PCP - Mari Gardner DO as Assigned PCP  Gale Morrow AuD as Audiologist (Audiology)  Cody Good MD as MD (Otolaryngology)  Cody Good MD as MD (Otolaryngology)  Sayra Dumont MD as Assigned Pulmonology Provider  Guillermina Lynne MD as Assigned Cancer Care Provider

## 2024-09-11 NOTE — PROGRESS NOTES
"Oncology Rooming Note    September 11, 2024 2:11 PM   Gayla Tavera is a 69 year old female who presents for:    Chief Complaint   Patient presents with    Oncology Clinic Visit    Radiation Therapy     Follow up     Initial Vitals: /77 (BP Location: Right arm, Patient Position: Sitting)   Pulse 74   Temp 98.3  F (36.8  C)   Resp 16   Wt 57.2 kg (126 lb 1.6 oz)   SpO2 98%   BMI 23.06 kg/m   Estimated body mass index is 23.06 kg/m  as calculated from the following:    Height as of 5/25/23: 1.575 m (5' 2\").    Weight as of this encounter: 57.2 kg (126 lb 1.6 oz). Body surface area is 1.58 meters squared.  No Pain (0) Comment: Data Unavailable   No LMP recorded. Patient is postmenopausal.  Allergies reviewed: Yes  Medications reviewed: Yes    Medications: Medication refills not needed today.  Pharm[facility] into [id number] [place] [address]    Frailty Screening:   Is the patient here for a new oncology consult visit in cancer care? 2. No      Clinical concerns: Follow up, pt had CT on 8/26 and awaiting results, did not realize Dr. Lynne had retired, able to get her a same day appointment, breathing is good as long as she exercises daily  Dr. Moody was notified.      Melany David RN              "

## 2024-09-24 ENCOUNTER — HOSPITAL ENCOUNTER (OUTPATIENT)
Dept: PET IMAGING | Facility: HOSPITAL | Age: 69
Discharge: HOME | End: 2024-09-24
Attending: RADIOLOGY
Payer: COMMERCIAL

## 2024-09-24 DIAGNOSIS — R91.8 PULMONARY NODULES: ICD-10-CM

## 2024-09-24 DIAGNOSIS — C34.31 MALIGNANT NEOPLASM OF LOWER LOBE OF RIGHT LUNG (H): ICD-10-CM

## 2024-09-24 DIAGNOSIS — C34.31 MALIGNANT NEOPLASM OF LOWER LOBE BRONCHUS, RIGHT (H): ICD-10-CM

## 2024-09-24 LAB
CREAT BLD-MCNC: 0.9 MG/DL (ref 0.6–1.1)
EGFRCR SERPLBLD CKD-EPI 2021: >60 ML/MIN/1.73M2
GLUCOSE BLDC GLUCOMTR-MCNC: 107 MG/DL (ref 70–99)

## 2024-09-24 PROCEDURE — 82962 GLUCOSE BLOOD TEST: CPT

## 2024-09-24 PROCEDURE — 78816 PET IMAGE W/CT FULL BODY: CPT | Mod: PS

## 2024-09-24 PROCEDURE — 343N000001 HC RX 343: Performed by: RADIOLOGY

## 2024-09-24 PROCEDURE — A9552 F18 FDG: HCPCS | Performed by: RADIOLOGY

## 2024-09-24 PROCEDURE — 82565 ASSAY OF CREATININE: CPT

## 2024-09-24 PROCEDURE — 250N000011 HC RX IP 250 OP 636: Performed by: RADIOLOGY

## 2024-09-24 PROCEDURE — 71260 CT THORAX DX C+: CPT

## 2024-09-24 RX ORDER — FLUDEOXYGLUCOSE F 18 200 MCI/ML
7-17 INJECTION, SOLUTION INTRAVENOUS ONCE
Status: COMPLETED | OUTPATIENT
Start: 2024-09-24 | End: 2024-09-24

## 2024-09-24 RX ORDER — IOPAMIDOL 755 MG/ML
62 INJECTION, SOLUTION INTRAVASCULAR ONCE
Status: COMPLETED | OUTPATIENT
Start: 2024-09-24 | End: 2024-09-24

## 2024-09-24 RX ADMIN — FLUDEOXYGLUCOSE F 18 10.1 MILLICURIE: 200 INJECTION, SOLUTION INTRAVENOUS at 07:04

## 2024-09-24 RX ADMIN — IOPAMIDOL 62 ML: 755 INJECTION, SOLUTION INTRAVENOUS at 08:04

## 2024-09-26 ENCOUNTER — PATIENT OUTREACH (OUTPATIENT)
Dept: ONCOLOGY | Facility: CLINIC | Age: 69
End: 2024-09-26

## 2024-09-26 ENCOUNTER — OFFICE VISIT (OUTPATIENT)
Dept: RADIATION ONCOLOGY | Facility: HOSPITAL | Age: 69
End: 2024-09-26
Attending: RADIOLOGY
Payer: COMMERCIAL

## 2024-09-26 VITALS
RESPIRATION RATE: 18 BRPM | SYSTOLIC BLOOD PRESSURE: 122 MMHG | OXYGEN SATURATION: 99 % | DIASTOLIC BLOOD PRESSURE: 82 MMHG | HEART RATE: 76 BPM

## 2024-09-26 DIAGNOSIS — C34.31 MALIGNANT NEOPLASM OF LOWER LOBE OF RIGHT LUNG (H): ICD-10-CM

## 2024-09-26 DIAGNOSIS — R94.2 ABNORMAL POSITRON EMISSION TOMOGRAPHY (PET) OF LUNG: ICD-10-CM

## 2024-09-26 DIAGNOSIS — R91.1 LUNG NODULE: Primary | ICD-10-CM

## 2024-09-26 DIAGNOSIS — R91.8 PULMONARY NODULES: ICD-10-CM

## 2024-09-26 PROCEDURE — G2211 COMPLEX E/M VISIT ADD ON: HCPCS | Performed by: RADIOLOGY

## 2024-09-26 PROCEDURE — G0463 HOSPITAL OUTPT CLINIC VISIT: HCPCS | Performed by: RADIOLOGY

## 2024-09-26 PROCEDURE — 99214 OFFICE O/P EST MOD 30 MIN: CPT | Performed by: RADIOLOGY

## 2024-09-26 RX ORDER — FENOFIBRATE 54 MG/1
54 TABLET ORAL AT BEDTIME
COMMUNITY
Start: 2024-09-21

## 2024-09-26 ASSESSMENT — PAIN SCALES - GENERAL: PAINLEVEL: NO PAIN (0)

## 2024-09-26 NOTE — PROGRESS NOTES
"Oncology Rooming Note    September 26, 2024 9:46 AM   Gayla Tavera is a 69 year old female who presents for:    Chief Complaint   Patient presents with    Oncology Clinic Visit     Rad Onc follow up after PET     Initial Vitals: /82   Pulse 76   Resp 18   SpO2 99%  Estimated body mass index is 23.06 kg/m  as calculated from the following:    Height as of 5/25/23: 1.575 m (5' 2\").    Weight as of 9/11/24: 57.2 kg (126 lb 1.6 oz). There is no height or weight on file to calculate BSA.  No Pain (0) Comment: Data Unavailable   No LMP recorded. Patient is postmenopausal.  Allergies reviewed: No  Medications reviewed: No    Medications: Medication refills not needed today.  Pharm[facility] [employer] [id number] [place] [address]    Frailty Screening:   Is the patient here for a new oncology consult visit in cancer care? 2. No      Clinical concerns: Dr. Moody wanted to see pt to go over PET results. He's recommending Pulmonary consult for bronchoscopy for biopsy and fiducial placement. She has two areas (one in each lung) that are suspicious for cancer. The area previously radiated is stable. Pt understood and knows Pulm will be calling her to set up appointments.        Shirin Boogie RN              "

## 2024-09-26 NOTE — PROGRESS NOTES
New Patient Oncology Nurse Navigator Note     Referring provider: Dr. Carlyle Moody    Referring Clinic/Organization: [facility]  Referred to: Interventional Pulmonology  Requested provider (if applicable): First available - did not specify   Referral Received: 09/26/24       Evaluation for :   Diagnosis   R91.1 (ICD-10-CM) - Lung nodule   R94.2 (ICD-10-CM) - Abnormal positron emission tomography (PET) of lung   C34.31 (ICD-10-CM) - Malignant neoplasm of lower lobe of right lung      Additional Information:  The patient has had prior radiation to the right lower lobe.  Follow-up PET scan reveals uptake suggestive of carcinoma in the left upper lobe and the right upper lobe.  Please evaluate for bronchoscopy biopsy and fiducial marker placement.    Clinical History (per Nurse review of records provided):      09/24/2024 PET CT (bookmarked) showed:   IMPRESSION:  1.  Mildly FDG avid opacities in the right lower lobe at the site of previous biopsy proven adenocarcinoma. No superimposed focal hypermetabolic uptake. Findings are favored secondary to evolving postradiation inflammation. Continued CT follow-up is   recommended.  2.  Additional mildly FDG avid right upper lobe nodule appears stable. Mildly increased wall thickening and FDG uptake along the medial aspect of a left upper lobe apex atypical pulmonary cyst. Findings likely represent additional pulmonary   adenocarcinomas.  3.  No evidence of FDG avid metastatic disease.    09/26/2024 OV note from referring provider (bookmarked) showed:   Assessment & Plan:   1.  Adenocarcinoma of the right lower lobe.  PET findings consistent with post radiation changes as expected.  2.  Abnormal PET scan with impression suggestive of possible carcinoma of the left upper lobe lesion and right upper lobe lesion.  Imaging was reviewed with the patient and my nurse Shirin present.  All patient's questions were answered.  3.  Recommend pulmonology consultation for bronchoscopy  and biopsy with fiducial marker placement.  If these lesions are found to be carcinoma the patient would be a candidate for SBRT.  Upon definitive diagnosis of carcinoma the patient would benefit from a medical oncology consultation upon confirmation.  4.  All the patient's questions were answered.  Referral placed for pulmonology.  We will follow-up with the patient after biopsy is completed.    Clinical Assessment / Barriers to Care (Per Nurse):  Tobacco History    Smoking Status  Former Quit Date  2021 Smoking Tobacco Type  Cigarettes quit in 2021, Other     Records Location: [facility]   Records Needed: None  Additional testing needed prior to consult: PFTs  Referral updates and Plan:   9/26 Msg sent to Dr. Sheriff.   9/27: Per sunny Emerson to schedule today at 3 pm or Monday virtual. Writer called Gayla to discuss the referral. She confirmed she is aware of the referral and is ready to schedule. We discussed the need for PFTs. All questions were answered. Her call was transferred to NPS to schedule same day appt.    CRISTO MorrisN, RN, OCN  [facility] Oncology Nurse Navigator  [phone] / [phone]

## 2024-09-26 NOTE — PROGRESS NOTES
[facility] Radiation Oncology Follow Up     Patient: Gayla Tavera  MRN: [id number]  Date of Service: 09/26/2024     Oncological stage : Non-small cell lung carcinoma T1 N0 M0    DISEASE TREATED: Right lower lobe non-small cell lung carcinoma.  Status post SBRT.      TYPE OF RADIATION THERAPY ADMINISTERED: SBRT 5000cGy/5 fractions Completed 8/4/2023      INTERVAL SINCE COMPLETION OF RADIATION THERAPY: 13 months       SUBJECTIVE:  Ms. Tavera is a 69 year old female who who is here for follow-up from 9/11/2024 to go over her recent findings on CT of the chest abdomen pelvis PET scan.  These reveal a possible new adenocarcinoma of the left upper lung and right upper lung.  Please see report below.    Medications were reviewed and are up to date on EPIC.    The following portions of the patient's history were reviewed and updated as appropriate: allergies, current medications, past family history, past medical history, past social history, past surgical history and problem list.    Review of Systems:      General  Constitutional  Constitutional (WDL): Exceptions to WDL  Fatigue: Fatigue relieved by rest  EENT  Eye Disorders  Eye Disorder (WDL): Assessment not pertinent to visit  Ear Disorders  Ear Disorder (WDL): Assessment not pertinent to visit  Respiratory  Respiratory  Respiratory (WDL): Exceptions to WDL  Dyspnea: Shortness of breath with moderate exertion  Cardiovascular  Cardiovascular  Cardiovascular (WDL): All cardiovascular elements are within defined limits  Gastrointestinal  Gastrointestinal  Gastrointestinal (WDL): All gastrointestinal elements are within defined limits  Musculoskeletal  Musculoskeletal and Connective Tissue Disorders  Musculoskeletal & Connective (WDL): All musculoskeletal & connective elements are within defined limits  Integumentary  Integumentary  Integumentary (WDL): All integumentary elements are within defined limits  Neurological  Neurosensory  Neurosensory (WDL): All  neurosensory elements are within defined limits  Genitourinary/Reproductive  Genitourinary  Genitourinary (WDL): All genitourinary elements are within defined limits  Lymphatic  Lymph System Disorders  Lymph (WDL): All lymph elements are within defined limits  Pain  Pain Score: No Pain (0)  AUA Assessment                                                              Accompanied by  Accompanied By: self only    Objective:     No exam performed today,    .     PHYSICAL EXAMINATION:    /82   Pulse 76   Resp 18   SpO2 99%     Gen: Alert, in NAD  Eyes: PERRL, EOMI, sclera anicteric  HENT     Head: NC/AT     Ears: No external auricular lesions     Nose/sinus: No rhinorrhea or epistaxis     Oropharynx: MMM, no visible oral lesions  Neck: Supple, full ROM, no LAD  Pulm: No wheezing, stridor or respiratory distress  CV: Well-perfused, no cyanosis, no pedal edema  Abdominal: BS+, soft, nontender, nondistended, no hepatomegaly  Back: No step-offs or pain to palpation along the thoracolumbar spine  Rectal: Deferred  : Deferred  Musculoskeletal: Normal muscle bulk and tone  Skin: Normal color and turgor  Neurologic: A/Ox3, CN II-XII intact, normal gait and station  Psychiatric: Appropriate mood and affect     Imaging: Imaging results 6 weeks:PET Oncology Whole Body    Result Date: 9/24/2024  EXAM: PET ONCOLOGY WHOLE BODY, CT CHEST/ABDOMEN/PELVIS W CONTRAST LOCATION: [facility] DATE: 9/24/2024 INDICATION: Subsequent treatment planning and restaging for malignant neoplasm of lower lobe, right bronchus or lung. Right lower lobe adenocarcinoma status post SBRT completed 08/04/2023. COMPARISON: CT chest 08/26/2024. FDG PET/CT 06/05/2023. CONTRAST: 62 mL Isovue-370. TECHNIQUE: Serum glucose level 107 mg/dL. One hour post intravenous administration of 10.1 mCi F-18 FDG, PET imaging was performed from the skull vertex to feet, utilizing attenuation correction with concurrent axial CT and PET/CT image  fusion. Separate diagnostic CT of the chest, abdomen, and pelvis was performed. Dose reduction techniques were used. PET/CT FINDINGS: Post radiation changes of the right lower lobe at the site of previous biopsy proven adenocarcinoma with fiducial markers. There is mildly FDG avid patchy consolidation of the right lower lobe (SUV max of 4.1) which is favored evolving postradiation inflammation. Stable 7 mm FDG avid right upper lobe nodule with SUV max of 2.8, previously 2.7. Similar atypical pulmonary cyst of the left upper lobe measuring 10 mm with mildly increased asymmetric wall thickening and FDG uptake along its  medial aspect when compared to prior PET with SUV max of 2.2, previously 1.6. No FDG avid thoracic adenopathy. Mild inflammatory uptake of the bilateral shoulders. Mild uptake of the lower thoracic esophagus is likely secondary to esophagitis. CT FINDINGS: Mild intracranial senescent changes. No significant coronary artery calcifications. Mild centrilobular emphysema. Stable non-FDG avid 4 mm nodular opacity the right upper lobe (series 8, image 42) which is below the resolution of PET. Hepatic steatosis. Colonic diverticula. Mild burden of atherosclerotic disease. Degenerative changes of the spine. Bilateral breast implants with probable rupture.     IMPRESSION: 1.  Mildly FDG avid opacities in the right lower lobe at the site of previous biopsy proven adenocarcinoma. No superimposed focal hypermetabolic uptake. Findings are favored secondary to evolving postradiation inflammation. Continued CT follow-up is recommended. 2.  Additional mildly FDG avid right upper lobe nodule appears stable. Mildly increased wall thickening and FDG uptake along the medial aspect of a left upper lobe apex atypical pulmonary cyst. Findings likely represent additional pulmonary adenocarcinomas. 3.  No evidence of FDG avid metastatic disease.    CT Chest/Abdomen/Pelvis w Contrast    Result Date: 9/24/2024  EXAM: PET  ONCOLOGY WHOLE BODY, CT CHEST/ABDOMEN/PELVIS W CONTRAST LOCATION: [facility] DATE: 9/24/2024 INDICATION: Subsequent treatment planning and restaging for malignant neoplasm of lower lobe, right bronchus or lung. Right lower lobe adenocarcinoma status post SBRT completed 08/04/2023. COMPARISON: CT chest 08/26/2024. FDG PET/CT 06/05/2023. CONTRAST: 62 mL Isovue-370. TECHNIQUE: Serum glucose level 107 mg/dL. One hour post intravenous administration of 10.1 mCi F-18 FDG, PET imaging was performed from the skull vertex to feet, utilizing attenuation correction with concurrent axial CT and PET/CT image fusion. Separate diagnostic CT of the chest, abdomen, and pelvis was performed. Dose reduction techniques were used. PET/CT FINDINGS: Post radiation changes of the right lower lobe at the site of previous biopsy proven adenocarcinoma with fiducial markers. There is mildly FDG avid patchy consolidation of the right lower lobe (SUV max of 4.1) which is favored evolving postradiation inflammation. Stable 7 mm FDG avid right upper lobe nodule with SUV max of 2.8, previously 2.7. Similar atypical pulmonary cyst of the left upper lobe measuring 10 mm with mildly increased asymmetric wall thickening and FDG uptake along its  medial aspect when compared to prior PET with SUV max of 2.2, previously 1.6. No FDG avid thoracic adenopathy. Mild inflammatory uptake of the bilateral shoulders. Mild uptake of the lower thoracic esophagus is likely secondary to esophagitis. CT FINDINGS: Mild intracranial senescent changes. No significant coronary artery calcifications. Mild centrilobular emphysema. Stable non-FDG avid 4 mm nodular opacity the right upper lobe (series 8, image 42) which is below the resolution of PET. Hepatic steatosis. Colonic diverticula. Mild burden of atherosclerotic disease. Degenerative changes of the spine. Bilateral breast implants with probable rupture.     IMPRESSION: 1.  Mildly FDG avid  opacities in the right lower lobe at the site of previous biopsy proven adenocarcinoma. No superimposed focal hypermetabolic uptake. Findings are favored secondary to evolving postradiation inflammation. Continued CT follow-up is recommended. 2.  Additional mildly FDG avid right upper lobe nodule appears stable. Mildly increased wall thickening and FDG uptake along the medial aspect of a left upper lobe apex atypical pulmonary cyst. Findings likely represent additional pulmonary adenocarcinomas. 3.  No evidence of FDG avid metastatic disease.    CT Chest w/o Contrast    Result Date: 8/27/2024  EXAM: CT CHEST W/O CONTRAST LOCATION: [facility] DATE: 8/26/2024 INDICATION: Decrease in size right lower lobe known lung cancer with fiducial marker with some slight increased surrounding posttreatment infiltrates.   2.  The 7 mm spiculated lesion is stable in size but suggestive of a new 3 mm solid component along the margin. COMPARISON: Chest CT on 3/18/2024 TECHNIQUE: CT chest without IV contrast. Multiplanar reformats were obtained. Dose reduction techniques were used. CONTRAST: None. FINDINGS: LUNGS AND PLEURA: No pleural effusion or pneumothorax. Mild upper lobes predominant emphysematous changes. There is 0.9 x 1.2 cm nodular opacity in the superior segment right lower lobe, just above the fiducial markers, increased in size as compared to 3  2024 exam when it measured 1.0 x 0.5 cm. Patchy consolidative pulmonary opacities in the superior segment right lower lobe near the fiducial markers, new/increased as compared to 3/18/2024, could represent posttreatment changes is less likely infection or atelectasis. Slightly more inferior there is patchy consolidative subpleural pulmonary opacity not significantly changed as compared to 3/18/2024 exam. Few other smaller nodular pulmonary opacities in the upper lobes, for example approximately 8 mm spiculated nodular opacity in the right upper lobe  (series 4 image 79) and 7 mm nodular opacity in the medial aspect of the left lung apex (series 4 image 45), not significantly changed as compared to 3/18/2024. MEDIASTINUM/AXILLAE: No cardiomegaly or significant pericardial effusion. No significant mediastinal or hilar lymphadenopathy. Bilateral likely ruptured breast implants. CORONARY ARTERY CALCIFICATION: None. UPPER ABDOMEN: Limited evaluation of the upper abdomen due to lack of coverage and contrast. Diffuse hypodense appearance of the liver, likely due to underlying hepatic steatosis. MUSCULOSKELETAL: Multilevel degenerative changes of the spine. No suspicious osseous lesion.     IMPRESSION: 1.  There is approximately 1.2 x 0.9 cm nodular opacity in the superior segment of the right lower lobe just above the fiducial markers, increased in size as compared to 3/18/2024 exam when it measured approximately 1.0 x 0.5 cm, worrisome for enlarging neoplastic nodule. 2.  There is patchy consolidative pulmonary opacities along the lateral and inferior aspects of the fiducial markers, new/increased as compared to 3/18/2024 exam, indeterminate, could represent posttreatment changes versus less likely infectious or atelectatic. Slightly more inferior, there is posterior right lower lobe subpleural pulmonary opacities, not significantly changed as compared to 3/18/2024 exam. 3.  A few other scattered nodular pulmonary opacities, for example 8 mm spiculated nodular opacity in the right upper lobe and 7 mm nodular opacity in the medial aspect of the left lung apex, not significantly changed as compared to 3/18/2024. 4.  Upper lobes predominant emphysematous changes. 5.  Hepatic steatosis.       Impression     Visit Dx:    (R91.1) Lung nodule  (primary encounter diagnosis)  Plan: Adult Pulmonary Medicine  Referral    (R94.2) Abnormal positron emission tomography (PET) of lung  Plan: Adult Pulmonary Medicine  Referral    (C34.31) Malignant neoplasm of lower  lobe of right lung (H)  Plan: Adult Pulmonary Medicine  Referral    (R91.8) Pulmonary nodules      Cancer Staging   Malignant neoplasm of lower lobe of right lung (H)  Staging form: Lung, AJCC 8th Edition  - Clinical stage from 11/9/2023: cT1, cN0, cM0 - Signed by Guillermina Lynne MD on 11/9/2023      Assessment & Plan:   1.  Adenocarcinoma of the right lower lobe.  PET findings consistent with post radiation changes as expected.  2.  Abnormal PET scan with impression suggestive of possible carcinoma of the left upper lobe lesion and right upper lobe lesion.  Imaging was reviewed with the patient and my nurse Shirin present.  All patient's questions were answered.  3.  Recommend pulmonology consultation for bronchoscopy and biopsy with fiducial marker placement.  If these lesions are found to be carcinoma the patient would be a candidate for SBRT.  Upon definitive diagnosis of carcinoma the patient would benefit from a medical oncology consultation upon confirmation.  4.  All the patient's questions were answered.  Referral placed for pulmonology.  We will follow-up with the patient after biopsy is completed.    Pain Management Plan: None    Face to face time  30 minutes with > 80% spent on consultation, education and coordination of care.    Carlyle Moody MD  Department of Radiation Oncology   [facility]  Tel: [phone]  Page: [phone]    [facility]  [address] [place] [address]     [address] [facility] [place] [address]    CC:  Patient Care Team:  Clary Johnston APRN CNP as PCP - Mari Gardner DO as Assigned PCP  Gale Morrow AuD as Audiologist (Audiology)  Cody Good MD as MD (Otolaryngology)  Cody Good MD as MD (Otolaryngology)  Sayra Dumont MD as Assigned Pulmonology Provider  Carlyle Moody MD as Assigned Cancer Care Provider

## 2024-09-26 NOTE — LETTER
"9/26/2024      Gayla Tavera  [address] [facility] [address]      Dear Colleague,    Thank you for referring your patient, Gayla Tavera, to the [facility] RADIATION ONCOLOGY [place]. Please see a copy of my visit note below.    Oncology Rooming Note    September 26, 2024 9:46 AM   Gayla Tavera is a 69 year old female who presents for:    Chief Complaint   Patient presents with     Oncology Clinic Visit     Rad Onc follow up after PET     Initial Vitals: /82   Pulse 76   Resp 18   SpO2 99%  Estimated body mass index is 23.06 kg/m  as calculated from the following:    Height as of 5/25/23: 1.575 m (5' 2\").    Weight as of 9/11/24: 57.2 kg (126 lb 1.6 oz). There is no height or weight on file to calculate BSA.  No Pain (0) Comment: Data Unavailable   No LMP recorded. Patient is postmenopausal.  Allergies reviewed: No  Medications reviewed: No    Medications: Medication refills not needed today.  Pharmacy name entered into [id number] [place] [address]    Frailty Screening:   Is the patient here for a new oncology consult visit in cancer care? 2. No      Clinical concerns: Dr. Moody wanted to see pt to go over PET results. He's recommending Pulmonary consult for bronchoscopy for biopsy and fiducial placement. She has two areas (one in each lung) that are suspicious for cancer. The area previously radiated is stable. Pt understood and knows Pulm will be calling her to set up appointments.        Shirin oBogie RN                [facility] Radiation Oncology Follow Up     Patient: Gayla Tavera  MRN: [id number]  Date of Service: 09/26/2024     Oncological stage : Non-small cell lung carcinoma T1 N0 M0    DISEASE TREATED: Right lower lobe non-small cell lung carcinoma.  Status post SBRT.      TYPE OF RADIATION THERAPY ADMINISTERED: SBRT 5000cGy/5 fractions Completed 8/4/2023      INTERVAL SINCE COMPLETION OF RADIATION THERAPY: 13 " months       SUBJECTIVE:  Ms. Tavera is a 69 year old female who who is here for follow-up from 9/11/2024 to go over her recent findings on CT of the chest abdomen pelvis PET scan.  These reveal a possible new adenocarcinoma of the left upper lung and right upper lung.  Please see report below.    Medications were reviewed and are up to date on EPIC.    The following portions of the patient's history were reviewed and updated as appropriate: allergies, current medications, past family history, past medical history, past social history, past surgical history and problem list.    Review of Systems:      General  Constitutional  Constitutional (WDL): Exceptions to WDL  Fatigue: Fatigue relieved by rest  EENT  Eye Disorders  Eye Disorder (WDL): Assessment not pertinent to visit  Ear Disorders  Ear Disorder (WDL): Assessment not pertinent to visit  Respiratory  Respiratory  Respiratory (WDL): Exceptions to WDL  Dyspnea: Shortness of breath with moderate exertion  Cardiovascular  Cardiovascular  Cardiovascular (WDL): All cardiovascular elements are within defined limits  Gastrointestinal  Gastrointestinal  Gastrointestinal (WDL): All gastrointestinal elements are within defined limits  Musculoskeletal  Musculoskeletal and Connective Tissue Disorders  Musculoskeletal & Connective (WDL): All musculoskeletal & connective elements are within defined limits  Integumentary  Integumentary  Integumentary (WDL): All integumentary elements are within defined limits  Neurological  Neurosensory  Neurosensory (WDL): All neurosensory elements are within defined limits  Genitourinary/Reproductive  Genitourinary  Genitourinary (WDL): All genitourinary elements are within defined limits  Lymphatic  Lymph System Disorders  Lymph (WDL): All lymph elements are within defined limits  Pain  Pain Score: No Pain (0)  AUA Assessment                                                              Accompanied by  Accompanied By: self  only    Objective:     No exam performed today,    .     PHYSICAL EXAMINATION:    /82   Pulse 76   Resp 18   SpO2 99%     Gen: Alert, in NAD  Eyes: PERRL, EOMI, sclera anicteric  HENT     Head: NC/AT     Ears: No external auricular lesions     Nose/sinus: No rhinorrhea or epistaxis     Oropharynx: MMM, no visible oral lesions  Neck: Supple, full ROM, no LAD  Pulm: No wheezing, stridor or respiratory distress  CV: Well-perfused, no cyanosis, no pedal edema  Abdominal: BS+, soft, nontender, nondistended, no hepatomegaly  Back: No step-offs or pain to palpation along the thoracolumbar spine  Rectal: Deferred  : Deferred  Musculoskeletal: Normal muscle bulk and tone  Skin: Normal color and turgor  Neurologic: A/Ox3, CN II-XII intact, normal gait and station  Psychiatric: Appropriate mood and affect     Imaging: Imaging results 6 weeks:PET Oncology Whole Body    Result Date: 9/24/2024  EXAM: PET ONCOLOGY WHOLE BODY, CT CHEST/ABDOMEN/PELVIS W CONTRAST LOCATION: [facility] DATE: 9/24/2024 INDICATION: Subsequent treatment planning and restaging for malignant neoplasm of lower lobe, right bronchus or lung. Right lower lobe adenocarcinoma status post SBRT completed 08/04/2023. COMPARISON: CT chest 08/26/2024. FDG PET/CT 06/05/2023. CONTRAST: 62 mL Isovue-370. TECHNIQUE: Serum glucose level 107 mg/dL. One hour post intravenous administration of 10.1 mCi F-18 FDG, PET imaging was performed from the skull vertex to feet, utilizing attenuation correction with concurrent axial CT and PET/CT image fusion. Separate diagnostic CT of the chest, abdomen, and pelvis was performed. Dose reduction techniques were used. PET/CT FINDINGS: Post radiation changes of the right lower lobe at the site of previous biopsy proven adenocarcinoma with fiducial markers. There is mildly FDG avid patchy consolidation of the right lower lobe (SUV max of 4.1) which is favored evolving postradiation inflammation. Stable 7  mm FDG avid right upper lobe nodule with SUV max of 2.8, previously 2.7. Similar atypical pulmonary cyst of the left upper lobe measuring 10 mm with mildly increased asymmetric wall thickening and FDG uptake along its  medial aspect when compared to prior PET with SUV max of 2.2, previously 1.6. No FDG avid thoracic adenopathy. Mild inflammatory uptake of the bilateral shoulders. Mild uptake of the lower thoracic esophagus is likely secondary to esophagitis. CT FINDINGS: Mild intracranial senescent changes. No significant coronary artery calcifications. Mild centrilobular emphysema. Stable non-FDG avid 4 mm nodular opacity the right upper lobe (series 8, image 42) which is below the resolution of PET. Hepatic steatosis. Colonic diverticula. Mild burden of atherosclerotic disease. Degenerative changes of the spine. Bilateral breast implants with probable rupture.     IMPRESSION: 1.  Mildly FDG avid opacities in the right lower lobe at the site of previous biopsy proven adenocarcinoma. No superimposed focal hypermetabolic uptake. Findings are favored secondary to evolving postradiation inflammation. Continued CT follow-up is recommended. 2.  Additional mildly FDG avid right upper lobe nodule appears stable. Mildly increased wall thickening and FDG uptake along the medial aspect of a left upper lobe apex atypical pulmonary cyst. Findings likely represent additional pulmonary adenocarcinomas. 3.  No evidence of FDG avid metastatic disease.    CT Chest/Abdomen/Pelvis w Contrast    Result Date: 9/24/2024  EXAM: PET ONCOLOGY WHOLE BODY, CT CHEST/ABDOMEN/PELVIS W CONTRAST LOCATION: [facility] DATE: 9/24/2024 INDICATION: Subsequent treatment planning and restaging for malignant neoplasm of lower lobe, right bronchus or lung. Right lower lobe adenocarcinoma status post SBRT completed 08/04/2023. COMPARISON: CT chest 08/26/2024. FDG PET/CT 06/05/2023. CONTRAST: 62 mL Isovue-370. TECHNIQUE: Serum  glucose level 107 mg/dL. One hour post intravenous administration of 10.1 mCi F-18 FDG, PET imaging was performed from the skull vertex to feet, utilizing attenuation correction with concurrent axial CT and PET/CT image fusion. Separate diagnostic CT of the chest, abdomen, and pelvis was performed. Dose reduction techniques were used. PET/CT FINDINGS: Post radiation changes of the right lower lobe at the site of previous biopsy proven adenocarcinoma with fiducial markers. There is mildly FDG avid patchy consolidation of the right lower lobe (SUV max of 4.1) which is favored evolving postradiation inflammation. Stable 7 mm FDG avid right upper lobe nodule with SUV max of 2.8, previously 2.7. Similar atypical pulmonary cyst of the left upper lobe measuring 10 mm with mildly increased asymmetric wall thickening and FDG uptake along its  medial aspect when compared to prior PET with SUV max of 2.2, previously 1.6. No FDG avid thoracic adenopathy. Mild inflammatory uptake of the bilateral shoulders. Mild uptake of the lower thoracic esophagus is likely secondary to esophagitis. CT FINDINGS: Mild intracranial senescent changes. No significant coronary artery calcifications. Mild centrilobular emphysema. Stable non-FDG avid 4 mm nodular opacity the right upper lobe (series 8, image 42) which is below the resolution of PET. Hepatic steatosis. Colonic diverticula. Mild burden of atherosclerotic disease. Degenerative changes of the spine. Bilateral breast implants with probable rupture.     IMPRESSION: 1.  Mildly FDG avid opacities in the right lower lobe at the site of previous biopsy proven adenocarcinoma. No superimposed focal hypermetabolic uptake. Findings are favored secondary to evolving postradiation inflammation. Continued CT follow-up is recommended. 2.  Additional mildly FDG avid right upper lobe nodule appears stable. Mildly increased wall thickening and FDG uptake along the medial aspect of a left upper lobe apex  atypical pulmonary cyst. Findings likely represent additional pulmonary adenocarcinomas. 3.  No evidence of FDG avid metastatic disease.    CT Chest w/o Contrast    Result Date: 8/27/2024  EXAM: CT CHEST W/O CONTRAST LOCATION: [facility] DATE: 8/26/2024 INDICATION: Decrease in size right lower lobe known lung cancer with fiducial marker with some slight increased surrounding posttreatment infiltrates.   2.  The 7 mm spiculated lesion is stable in size but suggestive of a new 3 mm solid component along the margin. COMPARISON: Chest CT on 3/18/2024 TECHNIQUE: CT chest without IV contrast. Multiplanar reformats were obtained. Dose reduction techniques were used. CONTRAST: None. FINDINGS: LUNGS AND PLEURA: No pleural effusion or pneumothorax. Mild upper lobes predominant emphysematous changes. There is 0.9 x 1.2 cm nodular opacity in the superior segment right lower lobe, just above the fiducial markers, increased in size as compared to 3  2024 exam when it measured 1.0 x 0.5 cm. Patchy consolidative pulmonary opacities in the superior segment right lower lobe near the fiducial markers, new/increased as compared to 3/18/2024, could represent posttreatment changes is less likely infection or atelectasis. Slightly more inferior there is patchy consolidative subpleural pulmonary opacity not significantly changed as compared to 3/18/2024 exam. Few other smaller nodular pulmonary opacities in the upper lobes, for example approximately 8 mm spiculated nodular opacity in the right upper lobe (series 4 image 79) and 7 mm nodular opacity in the medial aspect of the left lung apex (series 4 image 45), not significantly changed as compared to 3/18/2024. MEDIASTINUM/AXILLAE: No cardiomegaly or significant pericardial effusion. No significant mediastinal or hilar lymphadenopathy. Bilateral likely ruptured breast implants. CORONARY ARTERY CALCIFICATION: None. UPPER ABDOMEN: Limited evaluation of the upper  abdomen due to lack of coverage and contrast. Diffuse hypodense appearance of the liver, likely due to underlying hepatic steatosis. MUSCULOSKELETAL: Multilevel degenerative changes of the spine. No suspicious osseous lesion.     IMPRESSION: 1.  There is approximately 1.2 x 0.9 cm nodular opacity in the superior segment of the right lower lobe just above the fiducial markers, increased in size as compared to 3/18/2024 exam when it measured approximately 1.0 x 0.5 cm, worrisome for enlarging neoplastic nodule. 2.  There is patchy consolidative pulmonary opacities along the lateral and inferior aspects of the fiducial markers, new/increased as compared to 3/18/2024 exam, indeterminate, could represent posttreatment changes versus less likely infectious or atelectatic. Slightly more inferior, there is posterior right lower lobe subpleural pulmonary opacities, not significantly changed as compared to 3/18/2024 exam. 3.  A few other scattered nodular pulmonary opacities, for example 8 mm spiculated nodular opacity in the right upper lobe and 7 mm nodular opacity in the medial aspect of the left lung apex, not significantly changed as compared to 3/18/2024. 4.  Upper lobes predominant emphysematous changes. 5.  Hepatic steatosis.       Impression     Visit Dx:    (R91.1) Lung nodule  (primary encounter diagnosis)  Plan: Adult Pulmonary Medicine  Referral    (R94.2) Abnormal positron emission tomography (PET) of lung  Plan: Adult Pulmonary Medicine  Referral    (C34.31) Malignant neoplasm of lower lobe of right lung (H)  Plan: Adult Pulmonary Medicine  Referral    (R91.8) Pulmonary nodules      Cancer Staging   Malignant neoplasm of lower lobe of right lung (H)  Staging form: Lung, AJCC 8th Edition  - Clinical stage from 11/9/2023: cT1, cN0, cM0 - Signed by Guillermina Lynne MD on 11/9/2023      Assessment & Plan:   1.  Adenocarcinoma of the right lower lobe.  PET findings consistent  with post radiation changes as expected.  2.  Abnormal PET scan with impression suggestive of possible carcinoma of the left upper lobe lesion and right upper lobe lesion.  Imaging was reviewed with the patient and my nurse Shirin present.  All patient's questions were answered.  3.  Recommend pulmonology consultation for bronchoscopy and biopsy with fiducial marker placement.  If these lesions are found to be carcinoma the patient would be a candidate for SBRT.  Upon definitive diagnosis of carcinoma the patient would benefit from a medical oncology consultation upon confirmation.  4.  All the patient's questions were answered.  Referral placed for pulmonology.  We will follow-up with the patient after biopsy is completed.    Pain Management Plan: None    Face to face time  30 minutes with > 80% spent on consultation, education and coordination of care.    Carlyle Moody MD  Department of Radiation Oncology   [facility] Radiation Oncology  Tel: [phone]  Page: [phone]    [facility]  [address] [place] [address]     [place] [id number] [facility] [place] [address]    CC:  Patient Care Team:  Clary Johnston APRN CNP as PCP - Mari Gardner DO as Assigned PCP  Gale Morrow AuD as Audiologist (Audiology)  Cody Good MD as MD (Otolaryngology)  Cody Good MD as MD (Otolaryngology)  Sayra Dumont MD as Assigned Pulmonology Provider  Carlyle Moody MD as Assigned Cancer Care Provider      Again, thank you for allowing me to participate in the care of your patient.        Sincerely,        Carlyle Moody MD

## 2024-09-27 ENCOUNTER — PRE VISIT (OUTPATIENT)
Dept: PULMONOLOGY | Facility: CLINIC | Age: 69
End: 2024-09-27

## 2024-09-27 ENCOUNTER — ONCOLOGY VISIT (OUTPATIENT)
Dept: PULMONOLOGY | Facility: CLINIC | Age: 69
End: 2024-09-27
Attending: RADIOLOGY
Payer: COMMERCIAL

## 2024-09-27 VITALS
OXYGEN SATURATION: 98 % | HEART RATE: 83 BPM | DIASTOLIC BLOOD PRESSURE: 76 MMHG | BODY MASS INDEX: 23.3 KG/M2 | WEIGHT: 126.6 LBS | SYSTOLIC BLOOD PRESSURE: 128 MMHG | HEIGHT: 62 IN

## 2024-09-27 DIAGNOSIS — R91.1 LUNG NODULE: ICD-10-CM

## 2024-09-27 DIAGNOSIS — R94.2 ABNORMAL POSITRON EMISSION TOMOGRAPHY (PET) OF LUNG: ICD-10-CM

## 2024-09-27 DIAGNOSIS — C34.31 MALIGNANT NEOPLASM OF LOWER LOBE OF RIGHT LUNG (H): ICD-10-CM

## 2024-09-27 PROCEDURE — 99214 OFFICE O/P EST MOD 30 MIN: CPT | Performed by: INTERNAL MEDICINE

## 2024-09-27 NOTE — TELEPHONE ENCOUNTER
RECORDS STATUS - ALL OTHER DIAGNOSIS      RECORDS RECEIVED FROM: [facility] - Internal records   DATE RECEIVED: 9/26

## 2024-09-30 NOTE — PROGRESS NOTES
LUNG NODULE & INTERVENTIONAL PULMONARY CLINIC  [facility], [facility], [facility]     Gayla Tavera MRN# [id number]   Age: 69 year old YOB: 1955       Requesting Physician: Carlyle Moody MD  [address] [place] [address]       Assessment and Plan:    1. Known lung cancer s/p XRT now with two stable but mildly PET avid nodules. The identified growing nodule is not PET avid.  These look low yield to biopsy via any approach and given their behavior may not represent recurrent disease.  We will plan for short term CT follow up to detect any change in size.             History:     Gayla Tavera is a 69 year old female with sig h/o for lung cancer who is here for evaluation/followup of lung nodule(s).  She is overall feeling well. She has no pulmonary symptoms. She prefers to avoid invasive procedures or treatments at this time.      - My interpretation of the images relevant for this visit includes: two small mixed solid and subsolid nodules with a small amount of PET activity. The RLL sup seg nodule that increased in size is not PET avid.             Past Medical History:      Past Medical History:   Diagnosis Date    Anemia     Esophageal mass     Gastroesophageal reflux disease     High triglycerides     Lung cancer (H)            Past Surgical History:      Past Surgical History:   Procedure Laterality Date    ESOPHAGOSCOPY, GASTROSCOPY, DUODENOSCOPY (EGD), COMBINED N/A 8/11/2023    Procedure: ENDOSCOPIC ULTRASOUND UPPER TRACT, SUBCARINAL LYMPH NODE BIOPSIES;  Surgeon: Charles Roberts MD;  Location: [facility] Main OR          Social History:     Social History     Tobacco Use    Smoking status: Former     Current packs/day: 0.00     Types: Other, Cigarettes     Quit date: 2021     Years since quitting: 3.7    Smokeless tobacco: Never    Tobacco comments:     vapes, no cigarettes   Substance Use Topics    Alcohol use: Yes     Comment: social   "         Family History:     Family History   Problem Relation Age of Onset    Ovarian Cancer Sister            Allergies:    No Known Allergies       Medications:     Current Outpatient Medications   Medication Sig Dispense Refill    alendronate (FOSAMAX) 35 MG tablet TAKE 1 TABLET BY MOUTH ONCE WEEKLY W/ WATER 30 MINUTES BEFORE 1ST FOOD/DRINK/MED. STAY UP X 30 MINS      calcium carbonate (OS-NILS) 500 MG tablet Take 1 tablet by mouth 2 times daily      cholecalciferol (VITAMIN D3) 25 mcg (1000 units) capsule Take 1 capsule by mouth daily      famotidine (PEPCID) 20 MG tablet Take 1 tablet by mouth 2 times daily.      fenofibrate (LOFIBRA) 54 MG tablet Take 54 mg by mouth at bedtime.      multivitamin (CENTRUM SILVER) tablet Take 1 tablet by mouth daily      pantoprazole (PROTONIX) 40 MG EC tablet Take by mouth every morning.       No current facility-administered medications for this visit.          Review of Systems:     See HPI         Physical Exam:   /76 (BP Location: Left arm, Patient Position: Sitting, Cuff Size: Adult Regular)   Pulse 83   Ht 1.575 m (5' 2\")   Wt 57.4 kg (126 lb 9.6 oz)   SpO2 98%   BMI 23.16 kg/m      Constitutional - looks well, in no apparent distress  Eyes - no redness or discharge  Respiratory -breathing appears comfortable. No wheeze or rhonchi.   Cardiac -- Normal rate, rhythm.   Skin - No appreciable discoloration or lesions (very limited exam)  Neurological - No apparent tremors. Speech fluent and articlate  Psychiatric - no signs of delirium or anxiety          Current Laboratory Data:   All laboratory and imaging data reviewed.    No results found for this or any previous visit (from the past 24 hour(s)).               "

## 2024-10-10 DIAGNOSIS — R94.2 ABNORMAL POSITRON EMISSION TOMOGRAPHY (PET) OF LUNG: ICD-10-CM

## 2024-10-10 DIAGNOSIS — R91.8 PULMONARY NODULES: Primary | ICD-10-CM

## 2024-10-10 DIAGNOSIS — C34.31 MALIGNANT NEOPLASM OF LOWER LOBE OF RIGHT LUNG (H): ICD-10-CM

## 2025-01-10 ENCOUNTER — HOSPITAL ENCOUNTER (OUTPATIENT)
Dept: CT IMAGING | Facility: HOSPITAL | Age: 70
Discharge: HOME | End: 2025-01-10
Attending: RADIOLOGY
Payer: COMMERCIAL

## 2025-01-10 DIAGNOSIS — C34.31 MALIGNANT NEOPLASM OF LOWER LOBE OF RIGHT LUNG (H): ICD-10-CM

## 2025-01-10 DIAGNOSIS — R91.8 PULMONARY NODULES: ICD-10-CM

## 2025-01-10 DIAGNOSIS — R94.2 ABNORMAL POSITRON EMISSION TOMOGRAPHY (PET) OF LUNG: ICD-10-CM

## 2025-01-10 LAB
CREAT BLD-MCNC: 0.9 MG/DL (ref 0.6–1.1)
EGFRCR SERPLBLD CKD-EPI 2021: >60 ML/MIN/1.73M2

## 2025-01-10 PROCEDURE — 82565 ASSAY OF CREATININE: CPT

## 2025-01-10 PROCEDURE — 250N000011 HC RX IP 250 OP 636: Performed by: RADIOLOGY

## 2025-01-10 PROCEDURE — 71260 CT THORAX DX C+: CPT

## 2025-01-10 RX ORDER — IOPAMIDOL 755 MG/ML
75 INJECTION, SOLUTION INTRAVASCULAR ONCE
Status: COMPLETED | OUTPATIENT
Start: 2025-01-10 | End: 2025-01-10

## 2025-01-10 RX ADMIN — IOPAMIDOL 75 ML: 755 INJECTION, SOLUTION INTRAVENOUS at 12:11

## 2025-01-15 ENCOUNTER — OFFICE VISIT (OUTPATIENT)
Dept: RADIATION ONCOLOGY | Facility: HOSPITAL | Age: 70
End: 2025-01-15
Attending: RADIOLOGY
Payer: COMMERCIAL

## 2025-01-15 VITALS
SYSTOLIC BLOOD PRESSURE: 116 MMHG | OXYGEN SATURATION: 99 % | DIASTOLIC BLOOD PRESSURE: 79 MMHG | RESPIRATION RATE: 16 BRPM | HEART RATE: 69 BPM

## 2025-01-15 DIAGNOSIS — C34.31 MALIGNANT NEOPLASM OF LOWER LOBE BRONCHUS, RIGHT (H): ICD-10-CM

## 2025-01-15 DIAGNOSIS — R91.8 PULMONARY NODULES: ICD-10-CM

## 2025-01-15 DIAGNOSIS — R91.1 LUNG NODULE: Primary | ICD-10-CM

## 2025-01-15 PROCEDURE — 99214 OFFICE O/P EST MOD 30 MIN: CPT | Performed by: RADIOLOGY

## 2025-01-15 PROCEDURE — G0463 HOSPITAL OUTPT CLINIC VISIT: HCPCS | Performed by: RADIOLOGY

## 2025-01-15 PROCEDURE — G2211 COMPLEX E/M VISIT ADD ON: HCPCS | Performed by: RADIOLOGY

## 2025-01-15 ASSESSMENT — PAIN SCALES - GENERAL: PAINLEVEL_OUTOF10: NO PAIN (0)

## 2025-01-15 NOTE — PROGRESS NOTES
"Oncology Rooming Note    January 15, 2025 10:58 AM   Gayla Tavera is a 69 year old female who presents for:    Chief Complaint   Patient presents with    Oncology Clinic Visit     Rad Onc follow up     Initial Vitals: /79   Pulse 69   Resp 16   SpO2 99%  Estimated body mass index is 23.16 kg/m  as calculated from the following:    Height as of 9/27/24: 1.575 m (5' 2\").    Weight as of 9/27/24: 57.4 kg (126 lb 9.6 oz). There is no height or weight on file to calculate BSA.  No Pain (0) Comment: Data Unavailable   No LMP recorded. Patient is postmenopausal.  Allergies reviewed: Yes  Medications reviewed: Yes    Medications: Medication refills not needed today.  Pharm[id number] [place] [address]    Frailty Screening:   Is the patient here for a new oncology consult visit in cancer care? 2. No      Clinical concerns: Feeling fine, no concerns.   Dr. Moody was notified.    Pt aware that Dr. Moody is leaving and she may see Dr. Aguirre next visit      Shirin Boogie RN              "

## 2025-01-15 NOTE — LETTER
"1/15/2025      Gayla Tavera  [address] [facility] [address]      Dear Colleague,    Thank you for referring your patient, Gayla Tavera, to the [facility] RADIATION ONCOLOGY [place]. Please see a copy of my visit note below.    Oncology Rooming Note    January 15, 2025 10:58 AM   Gayla Tavera is a 69 year old female who presents for:    Chief Complaint   Patient presents with     Oncology Clinic Visit     Rad Onc follow up     Initial Vitals: /79   Pulse 69   Resp 16   SpO2 99%  Estimated body mass index is 23.16 kg/m  as calculated from the following:    Height as of 9/27/24: 1.575 m (5' 2\").    Weight as of 9/27/24: 57.4 kg (126 lb 9.6 oz). There is no height or weight on file to calculate BSA.  No Pain (0) Comment: Data Unavailable   No LMP recorded. Patient is postmenopausal.  Allergies reviewed: Yes  Medications reviewed: Yes    Medications: Medication refills not needed today.  Pharmacy name entered into [employer]: [id number] [place] [address]    Frailty Screening:   Is the patient here for a new oncology consult visit in cancer care? 2. No      Clinical concerns: Feeling fine, no concerns.   Dr. Moody was notified.    Pt aware that Dr. Moody is leaving and she may see Dr. Aguirre next visit      Shirin Boogie, RN                [facility] Radiation Oncology Follow Up     Patient: Gayla Tavera  MRN: [id number]  Date of Service: 01/15/2025     Oncological stage : Non-small cell lung carcinoma T1 N0 M0     DISEASE TREATED: Right lower lobe non-small cell lung carcinoma.  Status post SBRT.      TYPE OF RADIATION THERAPY ADMINISTERED: SBRT 5000cGy/5 fractions Completed 8/4/2023      INTERVAL SINCE COMPLETION OF RADIATION THERAPY: 18 months         SUBJECTIVE:  Ms. Tavera is a 69 year old female who was treated successfully with SBRT approximately 1.5 years ago.  She was seen by Dr. Jaziel VANEGAS for some pulmonary nodules of unknown " significance of the left lung and right lung.  He did not recommend biopsy at that time and recommended additional surveillance imaging to check for stability    Patient comes in today discussed that imaging and results are noted below.  The CT scan with contrast shows stability of these lesions.  No evidence of progression consistent with carcinoma by the radiologist interpretation.    Medications were reviewed and are up to date on EPIC.    The following portions of the patient's history were reviewed and updated as appropriate: allergies, current medications, past family history, past medical history, past social history, past surgical history and problem list.    Review of Systems:      General  Constitutional  Constitutional (WDL): Exceptions to WDL  Fatigue: Fatigue relieved by rest  EENT  Eye Disorders  Eye Disorder (WDL): All eye disorder elements are within defined limits  Ear Disorders  Ear Disorder (WDL): All ear disorder elements are within defined limits  Respiratory  Respiratory  Respiratory (WDL): All respiratory elements are within defined limits  Cardiovascular  Cardiovascular  Cardiovascular (WDL): All cardiovascular elements are within defined limits  Gastrointestinal  Gastrointestinal  Gastrointestinal (WDL): All gastrointestinal elements are within defined limits  Musculoskeletal  Musculoskeletal and Connective Tissue Disorders  Musculoskeletal & Connective (WDL): All musculoskeletal & connective elements are within defined limits  Integumentary  Integumentary  Integumentary (WDL): All integumentary elements are within defined limits  Neurological  Neurosensory  Neurosensory (WDL): All neurosensory elements are within defined limits  Genitourinary/Reproductive  Genitourinary  Genitourinary (WDL): All genitourinary elements are within defined limits  Lymphatic  Lymph System Disorders  Lymph (WDL): All lymph elements are within defined limits  Pain  Pain Score: No Pain (0)  AUA Assessment                                                               Accompanied by  Accompanied By: self only    Objective:        PHYSICAL EXAMINATION:    /79   Pulse 69   Resp 16   SpO2 99%     Gen: Alert, in NAD  Neurologic: A/Ox3, CN II-XII intact, normal gait and station  Psychiatric: Appropriate mood and affect     Imaging: Imaging results 6 weeks:CT CHEST W CONTRAST    Result Date: 1/13/2025  EXAM: CT CHEST W CONTRAST LOCATION: [facility] DATE: 1/10/2025 INDICATION: Pulmonary nodule evaluation; History of cancer; No known automatically detected potential contraindications to iodinated contrast COMPARISON: 9/24/2024 TECHNIQUE: CT chest with IV contrast. Multiplanar reformats were obtained. Dose reduction techniques were used. CONTRAST: isovue 370 75ml FINDINGS: LUNGS AND PLEURA: Emphysema. Fiducial markers in the right lower lobe. Evolving postradiation change with increased bandlike parenchymal opacities and mild volume loss. Stable 7 mm right upper lobe pulmonary nodule on series 4 image 73. Stable left apical atypical pulmonary cyst measuring 10 mm with similar asymmetric wall thickening along its medial aspect. No pleural effusions. MEDIASTINUM/AXILLAE: No thoracic aortic aneurysm. No lymphadenopathy. CORONARY ARTERY CALCIFICATION: None. UPPER ABDOMEN: Unremarkable MUSCULOSKELETAL: No suspicious osseous lesions.     IMPRESSION: 1.  Evolving postradiation change in the right lower lobe. 2.  Stable right upper lobe pulmonary nodule and left upper lobe atypical pulmonary cyst.      Impression     Visit Dx:    (R91.1) Lung nodule  (primary encounter diagnosis)  Plan: CT Chest w contrast*    (R91.8) Pulmonary nodules  Plan: CT Chest w contrast*    (C34.31) Malignant neoplasm of lower lobe bronchus, right (H)  Plan: Basic metabolic panel, CT Chest w contrast*      Cancer Staging   Malignant neoplasm of lower lobe of right lung (H)  Staging form: Lung, AJCC 8th Edition  - Clinical stage from  11/9/2023: cT1, cN0, cM0 - Signed by Guillermina Lynne MD on 11/9/2023      Assessment & Plan:   1. Carcinoma of the right lung.  Status post SBRT 1.5 years.  No evidence of recurrence or metastasis.    2. Stable 7 mm right upper lobe pulmonary nodule on series 4 image 73. Stable left apical atypical pulmonary cyst measuring 10 mm with similar asymmetric wall thickening along its medial aspect.       The pulmonary nodules of concern showed no evidence of progression consistent with carcinoma at this time.  I recommend the patient continue follow-up with Dr. Sheriff and our radiation oncology department.  I have recommended additional CT scan in 6 months to check for stability.  This has been ordered and BMP will be drawn the day prior.  Patient was given follow-up appointment in our clinic prior to leaving.    3.  All the patient's questions were answered she is very comfortable with the plan of care and thanked me for our services.    Pain Management Plan: N/A    Face to face time  35 minutes with > 80% spent on consultation, education and coordination of care.    DO JUAN J Chavez MBA-[facility]  Department of Radiation Oncology   [facility] Radiation Oncology  Tel: [phone]  Page: [phone]    [facility]  [address] [place] [address]     [address] [place] [address]    CC:  Patient Care Team:  Clary Johnston APRN CNP as PCP - Mari Gardner DO as Assigned PCP  Gale Morrow AuD as Audiologist (Audiology)  Cody Good MD as MD (Otolaryngology)  Cody Good MD as MD (Otolaryngology)  Carlyle Moody MD as Assigned Cancer Care Provider  Nawaf Sheriff MD as MD (Critical Care)  Carlyle Moody MD as MD  Nawaf Sheriff MD as Assigned Pulmonology Provider      Again, thank you for allowing me to participate in the care of your patient.        Sincerely,        Carlyle Moody MD    Electronically signed

## 2025-01-15 NOTE — PROGRESS NOTES
[facility] Radiation Oncology Follow Up     Patient: Gayla Tavera  MRN: [id number]  Date of Service: 01/15/2025     Oncological stage : Non-small cell lung carcinoma T1 N0 M0     DISEASE TREATED: Right lower lobe non-small cell lung carcinoma.  Status post SBRT.      TYPE OF RADIATION THERAPY ADMINISTERED: SBRT 5000cGy/5 fractions Completed 8/4/2023      INTERVAL SINCE COMPLETION OF RADIATION THERAPY: 18 months         SUBJECTIVE:  Ms. Tavera is a 69 year old female who was treated successfully with SBRT approximately 1.5 years ago.  She was seen by Dr. Jaziel VANEGAS for some pulmonary nodules of unknown significance of the left lung and right lung.  He did not recommend biopsy at that time and recommended additional surveillance imaging to check for stability    Patient comes in today discussed that imaging and results are noted below.  The CT scan with contrast shows stability of these lesions.  No evidence of progression consistent with carcinoma by the radiologist interpretation.    Medications were reviewed and are up to date on EPIC.    The following portions of the patient's history were reviewed and updated as appropriate: allergies, current medications, past family history, past medical history, past social history, past surgical history and problem list.    Review of Systems:      General  Constitutional  Constitutional (WDL): Exceptions to WDL  Fatigue: Fatigue relieved by rest  EENT  Eye Disorders  Eye Disorder (WDL): All eye disorder elements are within defined limits  Ear Disorders  Ear Disorder (WDL): All ear disorder elements are within defined limits  Respiratory  Respiratory  Respiratory (WDL): All respiratory elements are within defined limits  Cardiovascular  Cardiovascular  Cardiovascular (WDL): All cardiovascular elements are within defined limits  Gastrointestinal  Gastrointestinal  Gastrointestinal (WDL): All gastrointestinal elements are within defined  limits  Musculoskeletal  Musculoskeletal and Connective Tissue Disorders  Musculoskeletal & Connective (WDL): All musculoskeletal & connective elements are within defined limits  Integumentary  Integumentary  Integumentary (WDL): All integumentary elements are within defined limits  Neurological  Neurosensory  Neurosensory (WDL): All neurosensory elements are within defined limits  Genitourinary/Reproductive  Genitourinary  Genitourinary (WDL): All genitourinary elements are within defined limits  Lymphatic  Lymph System Disorders  Lymph (WDL): All lymph elements are within defined limits  Pain  Pain Score: No Pain (0)  AUA Assessment                                                              Accompanied by  Accompanied By: self only    Objective:        PHYSICAL EXAMINATION:    /79   Pulse 69   Resp 16   SpO2 99%     Gen: Alert, in NAD  Neurologic: A/Ox3, CN II-XII intact, normal gait and station  Psychiatric: Appropriate mood and affect     Imaging: Imaging results 6 weeks:CT CHEST W CONTRAST    Result Date: 1/13/2025  EXAM: CT CHEST W CONTRAST LOCATION: [facility] DATE: 1/10/2025 INDICATION: Pulmonary nodule evaluation; History of cancer; No known automatically detected potential contraindications to iodinated contrast COMPARISON: 9/24/2024 TECHNIQUE: CT chest with IV contrast. Multiplanar reformats were obtained. Dose reduction techniques were used. CONTRAST: isovue 370 75ml FINDINGS: LUNGS AND PLEURA: Emphysema. Fiducial markers in the right lower lobe. Evolving postradiation change with increased bandlike parenchymal opacities and mild volume loss. Stable 7 mm right upper lobe pulmonary nodule on series 4 image 73. Stable left apical atypical pulmonary cyst measuring 10 mm with similar asymmetric wall thickening along its medial aspect. No pleural effusions. MEDIASTINUM/AXILLAE: No thoracic aortic aneurysm. No lymphadenopathy. CORONARY ARTERY CALCIFICATION: None. UPPER  ABDOMEN: Unremarkable MUSCULOSKELETAL: No suspicious osseous lesions.     IMPRESSION: 1.  Evolving postradiation change in the right lower lobe. 2.  Stable right upper lobe pulmonary nodule and left upper lobe atypical pulmonary cyst.      Impression     Visit Dx:    (R91.1) Lung nodule  (primary encounter diagnosis)  Plan: CT Chest w contrast*    (R91.8) Pulmonary nodules  Plan: CT Chest w contrast*    (C34.31) Malignant neoplasm of lower lobe bronchus, right (H)  Plan: Basic metabolic panel, CT Chest w contrast*      Cancer Staging   Malignant neoplasm of lower lobe of right lung (H)  Staging form: Lung, AJCC 8th Edition  - Clinical stage from 11/9/2023: cT1, cN0, cM0 - Signed by Guillermina Lynne MD on 11/9/2023      Assessment & Plan:   1. Carcinoma of the right lung.  Status post SBRT 1.5 years.  No evidence of recurrence or metastasis.    2. Stable 7 mm right upper lobe pulmonary nodule on series 4 image 73. Stable left apical atypical pulmonary cyst measuring 10 mm with similar asymmetric wall thickening along its medial aspect.       The pulmonary nodules of concern showed no evidence of progression consistent with carcinoma at this time.  I recommend the patient continue follow-up with Dr. Sheriff and our radiation oncology department.  I have recommended additional CT scan in 6 months to check for stability.  This has been ordered and BMP will be drawn the day prior.  Patient was given follow-up appointment in our clinic prior to leaving.    3.  All the patient's questions were answered she is very comfortable with the plan of care and thanked me for our services.    Pain Management Plan: N/A    Face to face time  35 minutes with > 80% spent on consultation, education and coordination of care.    DO JUAN J Chavez MBA-[facility]  Department of Radiation Oncology   [facility] Radiation Oncology  Tel: [phone]  Page: [phone]    [id number] [place]  [address]     [facility]   [address] [place] [address]    CC:  Patient Care Team:  Clary Johnston, APRN CNP as PCP - General  Mari Nuñez DO as Assigned PCP  Gale Morrow AuD as Audiologist (Audiology)  Cody Good MD as MD (Otolaryngology)  Cody Good MD as MD (Otolaryngology)  Carlyle Moody MD as Assigned Cancer Care Provider  Nawaf Sheriff MD as MD (Critical Care)  Carlyle Moody MD as Nawaf Ridley MD as Assigned Pulmonology Provider

## 2025-02-26 ENCOUNTER — ANCILLARY PROCEDURE (OUTPATIENT)
Dept: MAMMOGRAPHY | Facility: CLINIC | Age: 70
End: 2025-02-26
Payer: COMMERCIAL

## 2025-02-26 DIAGNOSIS — Z12.31 VISIT FOR SCREENING MAMMOGRAM: ICD-10-CM

## 2025-02-26 PROCEDURE — 77067 SCR MAMMO BI INCL CAD: CPT | Mod: TC | Performed by: RADIOLOGY

## 2025-02-26 PROCEDURE — 77063 BREAST TOMOSYNTHESIS BI: CPT | Mod: TC | Performed by: RADIOLOGY

## 2025-06-10 ENCOUNTER — HOSPITAL ENCOUNTER (OUTPATIENT)
Dept: CT IMAGING | Facility: HOSPITAL | Age: 70
Discharge: HOME | End: 2025-06-10
Attending: RADIOLOGY
Payer: COMMERCIAL

## 2025-06-10 DIAGNOSIS — R91.1 LUNG NODULE: ICD-10-CM

## 2025-06-10 DIAGNOSIS — C34.31 MALIGNANT NEOPLASM OF LOWER LOBE BRONCHUS, RIGHT (H): ICD-10-CM

## 2025-06-10 DIAGNOSIS — R91.8 PULMONARY NODULES: ICD-10-CM

## 2025-06-10 LAB
CREAT BLD-MCNC: 0.8 MG/DL (ref 0.5–1)
EGFRCR SERPLBLD CKD-EPI 2021: >60 ML/MIN/1.73M2

## 2025-06-10 PROCEDURE — 250N000011 HC RX IP 250 OP 636: Performed by: RADIOLOGY

## 2025-06-10 PROCEDURE — 82565 ASSAY OF CREATININE: CPT

## 2025-06-10 PROCEDURE — 71260 CT THORAX DX C+: CPT

## 2025-06-10 RX ORDER — IOPAMIDOL 755 MG/ML
67 INJECTION, SOLUTION INTRAVASCULAR ONCE
Status: COMPLETED | OUTPATIENT
Start: 2025-06-10 | End: 2025-06-10

## 2025-06-10 RX ADMIN — IOPAMIDOL 67 ML: 755 INJECTION, SOLUTION INTRAVENOUS at 12:38

## 2025-06-11 ENCOUNTER — ONCOLOGY VISIT (OUTPATIENT)
Dept: PULMONOLOGY | Facility: CLINIC | Age: 70
End: 2025-06-11
Payer: COMMERCIAL

## 2025-06-11 VITALS
WEIGHT: 124.8 LBS | SYSTOLIC BLOOD PRESSURE: 130 MMHG | DIASTOLIC BLOOD PRESSURE: 87 MMHG | BODY MASS INDEX: 22.83 KG/M2 | HEART RATE: 72 BPM | OXYGEN SATURATION: 100 %

## 2025-06-11 DIAGNOSIS — R91.8 PULMONARY NODULES: ICD-10-CM

## 2025-06-11 DIAGNOSIS — J98.4 LUNG CYST: ICD-10-CM

## 2025-06-11 DIAGNOSIS — C34.31 MALIGNANT NEOPLASM OF LOWER LOBE OF RIGHT LUNG (H): Primary | ICD-10-CM

## 2025-06-11 PROCEDURE — 99214 OFFICE O/P EST MOD 30 MIN: CPT | Performed by: INTERNAL MEDICINE

## 2025-06-11 NOTE — PROGRESS NOTES
LUNG NODULE & INTERVENTIONAL PULMONARY CLINIC  CL[facility], [facility], [facility]     Gayla Tavera MRN# [id number]   Age: 69 year old YOB: 1955       Requesting Physician: Carlyle Moody MD  [address] [place] [address]       Assessment and Plan:    1. Known lung cancer s/p XRT now with what appears to be sustained remission.  I will follow-up final radiology read but primary lesion looks stable.    2.  Pulmonary nodules and pulmonary cyst.  These appear stable on my interpretation.             History:     Gayla Tavera is a 70 year old female with sig h/o for lung cancer who is here for evaluation/followup of lung nodule(s).  She is overall feeling well. She has no pulmonary symptoms. She prefers to avoid invasive procedures or treatments at this time.      - My interpretation of the images relevant for this visit includes: Stable posttreatment area and no new concerning nodules.           Past Medical History:      Past Medical History:   Diagnosis Date    Anemia     Esophageal mass     Gastroesophageal reflux disease     High triglycerides     Lung cancer (H)            Past Surgical History:      Past Surgical History:   Procedure Laterality Date    ESOPHAGOSCOPY, GASTROSCOPY, DUODENOSCOPY (EGD), COMBINED N/A 2023    Procedure: ENDOSCOPIC ULTRASOUND UPPER TRACT, SUBCARINAL LYMPH NODE BIOPSIES;  Surgeon: Charles Roberts MD;  Location: [facility] OR          Social History:     Social History     Tobacco Use    Smoking status: Former     Current packs/day: 0.00     Types: Other, Cigarettes     Quit date:      Years since quittin.4    Smokeless tobacco: Never    Tobacco comments:     vapes, no cigarettes   Substance Use Topics    Alcohol use: Yes     Comment: social           Family History:     Family History   Problem Relation Age of Onset    Ovarian Cancer Sister            Allergies:    No Known Allergies        Medications:     Current Outpatient Medications   Medication Sig Dispense Refill    cholecalciferol (VITAMIN D3) 25 mcg (1000 units) capsule Take 1 capsule by mouth daily      multivitamin (CENTRUM SILVER) tablet Take 1 tablet by mouth daily      pantoprazole (PROTONIX) 40 MG EC tablet Take by mouth every morning.      alendronate (FOSAMAX) 35 MG tablet TAKE 1 TABLET BY MOUTH ONCE WEEKLY W/ WATER 30 MINUTES BEFORE 1ST FOOD/DRINK/MED. STAY UP X 30 MINS (Patient not taking: Reported on 6/11/2025)      calcium carbonate (OS-NILS) 500 MG tablet Take 1 tablet by mouth 2 times daily (Patient not taking: Reported on 6/11/2025)      famotidine (PEPCID) 20 MG tablet Take 1 tablet by mouth 2 times daily. (Patient not taking: Reported on 6/11/2025)      fenofibrate (LOFIBRA) 54 MG tablet Take 54 mg by mouth at bedtime. (Patient not taking: Reported on 6/11/2025)       No current facility-administered medications for this visit.          Review of Systems:     See HPI         Physical Exam:   /87 (BP Location: Left arm, Patient Position: Sitting, Cuff Size: Adult Regular)   Pulse 72   Wt 56.6 kg (124 lb 12.8 oz)   SpO2 100%   BMI 22.83 kg/m      Constitutional - looks well, in no apparent distress  Eyes - no redness or discharge  Respiratory -breathing appears comfortable. No wheeze or rhonchi.   Skin - No appreciable discoloration or lesions (very limited exam)  Neurological - No apparent tremors. Speech fluent and articlate  Psychiatric - no signs of delirium or anxiety          Current Laboratory Data:   All laboratory and imaging data reviewed.    Results for orders placed or performed during the hospital encounter of 06/10/25 (from the past 24 hours)   Creatinine POCT   Result Value Ref Range    Creatinine POCT 0.8 0.5 - 1.0 mg/dL    GFR, ESTIMATED POCT >60 >60 mL/min/1.73m2    Narrative    Reference intervals for this test were updated on 03/10/2025 to standardize reference intervals for creatinine measured by  different instruments. There may be differences in the flagging of prior results with similar values performed with this method. Those prior results can be interpreted in the context of the updated reference intervals.

## (undated) DEVICE — SUCTION MANIFOLD NEPTUNE 2 SYS 1 PORT 702-025-000

## (undated) DEVICE — TUBING SUCTION MEDI-VAC 1/4"X20' N620A

## (undated) DEVICE — SOL WATER IRRIG 1000ML BOTTLE 2F7114

## (undated) DEVICE — ENDO NDL ASPIRATION 22GA SLIMLINE EXPECT EUS M00555510

## (undated) RX ORDER — FENTANYL CITRATE 50 UG/ML
INJECTION, SOLUTION INTRAMUSCULAR; INTRAVENOUS
Status: DISPENSED
Start: 2023-06-26